# Patient Record
Sex: MALE | Race: WHITE | Employment: OTHER | URBAN - METROPOLITAN AREA
[De-identification: names, ages, dates, MRNs, and addresses within clinical notes are randomized per-mention and may not be internally consistent; named-entity substitution may affect disease eponyms.]

---

## 2020-03-04 RX ORDER — CEFAZOLIN SODIUM/WATER 2 G/20 ML
2 SYRINGE (ML) INTRAVENOUS ONCE
Status: CANCELLED | OUTPATIENT
Start: 2020-03-04 | End: 2020-03-04

## 2020-03-18 ENCOUNTER — HOSPITAL ENCOUNTER (OUTPATIENT)
Dept: SURGERY | Age: 85
Discharge: HOME | End: 2020-03-18
Payer: MEDICARE

## 2020-03-18 VITALS
OXYGEN SATURATION: 97 % | SYSTOLIC BLOOD PRESSURE: 103 MMHG | DIASTOLIC BLOOD PRESSURE: 67 MMHG | HEIGHT: 72 IN | RESPIRATION RATE: 18 BRPM | BODY MASS INDEX: 25.25 KG/M2 | TEMPERATURE: 98.1 F | HEART RATE: 94 BPM | WEIGHT: 186.44 LBS

## 2020-03-18 LAB
ANION GAP SERPL CALC-SCNC: 6 MMOL/L (ref 7–16)
BACTERIA SPEC CULT: NORMAL
BASOPHILS # BLD: 0 K/UL (ref 0–0.2)
BASOPHILS NFR BLD: 0 % (ref 0–2)
BUN SERPL-MCNC: 17 MG/DL (ref 8–23)
CALCIUM SERPL-MCNC: 9.2 MG/DL (ref 8.3–10.4)
CHLORIDE SERPL-SCNC: 105 MMOL/L (ref 98–107)
CO2 SERPL-SCNC: 29 MMOL/L (ref 21–32)
CREAT SERPL-MCNC: 1.19 MG/DL (ref 0.8–1.5)
DIFFERENTIAL METHOD BLD: ABNORMAL
EOSINOPHIL # BLD: 0.1 K/UL (ref 0–0.8)
EOSINOPHIL NFR BLD: 1 % (ref 0.5–7.8)
ERYTHROCYTE [DISTWIDTH] IN BLOOD BY AUTOMATED COUNT: 13.4 % (ref 11.9–14.6)
GLUCOSE SERPL-MCNC: 93 MG/DL (ref 65–100)
HCT VFR BLD AUTO: 39.3 % (ref 41.1–50.3)
HGB BLD-MCNC: 13.6 G/DL (ref 13.6–17.2)
IMM GRANULOCYTES # BLD AUTO: 0.1 K/UL (ref 0–0.5)
IMM GRANULOCYTES NFR BLD AUTO: 1 % (ref 0–5)
LYMPHOCYTES # BLD: 2 K/UL (ref 0.5–4.6)
LYMPHOCYTES NFR BLD: 38 % (ref 13–44)
MCH RBC QN AUTO: 32 PG (ref 26.1–32.9)
MCHC RBC AUTO-ENTMCNC: 34.6 G/DL (ref 31.4–35)
MCV RBC AUTO: 92.5 FL (ref 79.6–97.8)
MONOCYTES # BLD: 0.8 K/UL (ref 0.1–1.3)
MONOCYTES NFR BLD: 14 % (ref 4–12)
NEUTS SEG # BLD: 2.4 K/UL (ref 1.7–8.2)
NEUTS SEG NFR BLD: 46 % (ref 43–78)
NRBC # BLD: 0 K/UL (ref 0–0.2)
PLATELET # BLD AUTO: 172 K/UL (ref 150–450)
PMV BLD AUTO: 9 FL (ref 9.4–12.3)
POTASSIUM SERPL-SCNC: 4 MMOL/L (ref 3.5–5.1)
RBC # BLD AUTO: 4.25 M/UL (ref 4.23–5.6)
SERVICE CMNT-IMP: NORMAL
SODIUM SERPL-SCNC: 140 MMOL/L (ref 136–145)
WBC # BLD AUTO: 5.2 K/UL (ref 4.3–11.1)

## 2020-03-18 PROCEDURE — 80048 BASIC METABOLIC PNL TOTAL CA: CPT

## 2020-03-18 PROCEDURE — 87641 MR-STAPH DNA AMP PROBE: CPT

## 2020-03-18 PROCEDURE — 85025 COMPLETE CBC W/AUTO DIFF WBC: CPT

## 2020-03-18 PROCEDURE — 77030027138 HC INCENT SPIROMETER -A

## 2020-03-18 RX ORDER — GABAPENTIN 300 MG/1
300 CAPSULE ORAL 3 TIMES DAILY
Status: ON HOLD | COMMUNITY
End: 2020-06-01 | Stop reason: ALTCHOICE

## 2020-03-18 RX ORDER — HYDROCODONE BITARTRATE AND ACETAMINOPHEN 5; 325 MG/1; MG/1
1 TABLET ORAL
COMMUNITY
End: 2020-06-03

## 2020-03-18 RX ORDER — HYDROGEN PEROXIDE 3 %
20 SOLUTION, NON-ORAL MISCELLANEOUS DAILY
Status: ON HOLD | COMMUNITY
End: 2020-06-01 | Stop reason: ALTCHOICE

## 2020-03-18 RX ORDER — DOXAZOSIN 2 MG/1
4 TABLET ORAL DAILY
COMMUNITY

## 2020-03-18 RX ORDER — GUAIFENESIN 100 MG/5ML
81 LIQUID (ML) ORAL DAILY
COMMUNITY

## 2020-03-18 RX ORDER — TRAMADOL HYDROCHLORIDE 50 MG/1
50 TABLET ORAL
COMMUNITY

## 2020-03-18 RX ORDER — LISINOPRIL 5 MG/1
5 TABLET ORAL DAILY
COMMUNITY

## 2020-03-18 NOTE — PERIOP NOTES
Patient unable to provide U/A.  Urine specimen cup sent with patient and instructed to return in the am.

## 2020-03-18 NOTE — PERIOP NOTES
PLEASE CONTINUE TAKING ALL PRESCRIPTION MEDICATIONS UP TO THE DAY OF SURGERY UNLESS OTHERWISE DIRECTED BELOW. DISCONTINUE all vitamins and supplements 7 days prior to surgery. DISCONTINUE Non-Steriodal Anti-Inflammatory (NSAIDS) such as Advil and Aleve 5 days prior to surgery. Home Medications to take  the day of surgery    Acetaminophen 1000 mg, Aspirin 81 mg, Doxazosin, Esomeprazole, Levothyroxine   You may have Hydrocodone or Tramadol if needed on day of surgery        Home Medications   to Hold   None additional        Comments    On the day before surgery take Acetaminophen 1000mg in the morning and at bedtime       *Visitor policy of 1 visitor per patient discussed. Please do not bring home medications with you on the day of surgery unless otherwise directed by your nurse. If you are instructed to bring home medications, please give them to your nurse as they will be administered by the nursing staff. If you have any questions, please call [facility] [phone] or [facility] [phone]. A copy of this note was provided to the patient for reference.

## 2020-03-18 NOTE — PERIOP NOTES
Lab results reviewed and acceptable per anesthesia protocol. Recent Results (from the past 12 hour(s))   MSSA/MRSA SC BY PCR, NASAL SWAB    Collection Time: 03/18/20  1:26 PM   Result Value Ref Range    Special Requests: NO SPECIAL REQUESTS      Culture result:        SA target not detected. A MRSA NEGATIVE, SA NEGATIVE test result does not preclude MRSA or SA nasal colonization. CBC WITH AUTOMATED DIFF    Collection Time: 03/18/20  1:26 PM   Result Value Ref Range    WBC 5.2 4.3 - 11.1 K/uL    RBC 4.25 4.23 - 5.6 M/uL    HGB 13.6 13.6 - 17.2 g/dL    HCT 39.3 (L) 41.1 - 50.3 %    MCV 92.5 79.6 - 97.8 FL    MCH 32.0 26.1 - 32.9 PG    MCHC 34.6 31.4 - 35.0 g/dL    RDW 13.4 11.9 - 14.6 %    PLATELET 798 258 - 529 K/uL    MPV 9.0 (L) 9.4 - 12.3 FL    ABSOLUTE NRBC 0.00 0.0 - 0.2 K/uL    DF AUTOMATED      NEUTROPHILS 46 43 - 78 %    LYMPHOCYTES 38 13 - 44 %    MONOCYTES 14 (H) 4.0 - 12.0 %    EOSINOPHILS 1 0.5 - 7.8 %    BASOPHILS 0 0.0 - 2.0 %    IMMATURE GRANULOCYTES 1 0.0 - 5.0 %    ABS. NEUTROPHILS 2.4 1.7 - 8.2 K/UL    ABS. LYMPHOCYTES 2.0 0.5 - 4.6 K/UL    ABS. MONOCYTES 0.8 0.1 - 1.3 K/UL    ABS. EOSINOPHILS 0.1 0.0 - 0.8 K/UL    ABS. BASOPHILS 0.0 0.0 - 0.2 K/UL    ABS. IMM.  GRANS. 0.1 0.0 - 0.5 K/UL   METABOLIC PANEL, BASIC    Collection Time: 03/18/20  1:26 PM   Result Value Ref Range    Sodium 140 136 - 145 mmol/L    Potassium 4.0 3.5 - 5.1 mmol/L    Chloride 105 98 - 107 mmol/L    CO2 29 21 - 32 mmol/L    Anion gap 6 (L) 7 - 16 mmol/L    Glucose 93 65 - 100 mg/dL    BUN 17 8 - 23 MG/DL    Creatinine 1.19 0.8 - 1.5 MG/DL    GFR est AA >60 >60 ml/min/1.73m2    GFR est non-AA >60 >60 ml/min/1.73m2    Calcium 9.2 8.3 - 10.4 MG/DL   EKG, 12 LEAD, INITIAL    Collection Time: 03/18/20  1:37 PM   Result Value Ref Range    Ventricular Rate 60 BPM    Atrial Rate 60 BPM    P-R Interval 244 ms    QRS Duration 100 ms    Q-T Interval 412 ms    QTC Calculation (Bezet) 412 ms Calculated P Axis 53 degrees    Calculated R Axis -44 degrees    Calculated T Axis 23 degrees    Diagnosis       Sinus rhythm with 1st degree A-V block  Left axis deviation  Inferior infarct , age undetermined  Abnormal ECG  When compared with ECG of 03-APR-2001 13:15,  VA interval has increased  Inferior infarct is now Present

## 2020-03-18 NOTE — PERIOP NOTES
Patient verified name & . Order to obtain consent  found in EHR  and matches case posting. Pt verbalizes understanding of 1 total caregiver/ / visitor policy. TYPE  CASE:ll              Orders:  received  Labs per Spine protocol:  CBC, BMP, U/A, MRSA, EKG, T&S on DOS   Labs per anesthesia protocol: None additional  EKG/cardiac records  : Today SR with 1st degree AV block, within anesthesia guidelines. Patient has total of 6 stents, last placed in 2017. No further cardiac events since. Patient does not take any thinners. Glucose: None needed    Medication bottles were visualized today. Instructed patient to continue previous medications as prescribed prior to surgery and  to [address] ON THE DAY OF SURGERY according to anesthesia guidelines with a small sip of water : See med list       Continue all previous medications unless otherwise directed. Instructed patient to hold all vitamins and supplements (with exception of renal vitamins) and the following medications prior to surgery: None additional    Pt viewed Spine Pre-hab Video. All further questions were addressed. Pt was provided with antibacterial or non-moisturizing soap, Hibiclens, lSpine Recovery booklet and incentive spirometer. Pt correctly demonstrated use of incentive spirometer and instruction to bring it to the hospital on day of surgery. Handouts and all Surgery instructions provided to pt and pt verbalizes understanding. Patient Guide to Surgery Packet provided to patient. Packet includes Patient Guide to surgery handout, Facts about Pain Management handout, Facts about Urinary Catherization handout, Hand Hygiene handout, Patient Education and Teaching Sheet -Transfusion of Blood and Blood Products handout, and  [facility] frequent question and answer sheet. Guide reviewed with the patient and all questions answered to the satisfaction of the patient.      Pt advised to visit [email or web address] for more educational information regarding anesthesia and to record any additional questions that arise so that it can be addressed by the anesthesiologist on the morning of surgery. Patient instructed on the following and verbalized understanding:  Arrive at MAIN entrance, time of arrival to be called the day before by 1700. Responsible adult must drive patient to and from hospital, stay during surgery and 24 hours postoperatively. Npo after midnight including gum, mints and ice chips. Shower using hibiclens the night before and the morning of surgery. Hibiclens provided to the patient with handout and verbal instructions for use. Leave all valuables at home. Instructed on no jewelry or body piercings on the dos. Bring insurance card and ID. No perfumes, oil, powder, colognes, makeup or  lotions on the skin. Patient verbalized understanding of all instructions and provided all medical/health information to the best of their ability.

## 2020-03-19 ENCOUNTER — HOSPITAL ENCOUNTER (OUTPATIENT)
Dept: LAB | Age: 85
Discharge: HOME | End: 2020-03-19
Payer: MEDICARE

## 2020-03-19 LAB
APPEARANCE UR: NORMAL
ATRIAL RATE: 60 BPM
BILIRUB UR QL: NEGATIVE
CALCULATED P AXIS, ECG09: 53 DEGREES
CALCULATED R AXIS, ECG10: -44 DEGREES
CALCULATED T AXIS, ECG11: 23 DEGREES
COLOR UR: YELLOW
DIAGNOSIS, 93000: NORMAL
GLUCOSE UR STRIP.AUTO-MCNC: NEGATIVE MG/DL
HGB UR QL STRIP: NEGATIVE
KETONES UR QL STRIP.AUTO: NEGATIVE MG/DL
LEUKOCYTE ESTERASE UR QL STRIP.AUTO: NEGATIVE
NITRITE UR QL STRIP.AUTO: NEGATIVE
P-R INTERVAL, ECG05: 244 MS
PH UR STRIP: 6 [PH] (ref 5–9)
PROT UR STRIP-MCNC: NEGATIVE MG/DL
Q-T INTERVAL, ECG07: 412 MS
QRS DURATION, ECG06: 100 MS
QTC CALCULATION (BEZET), ECG08: 412 MS
SP GR UR REFRACTOMETRY: 1.01 (ref 1–1.02)
UROBILINOGEN UR QL STRIP.AUTO: 0.2 EU/DL (ref 0.2–1)
VENTRICULAR RATE, ECG03: 60 BPM

## 2020-03-19 PROCEDURE — 81003 URINALYSIS AUTO W/O SCOPE: CPT

## 2020-05-04 RX ORDER — CEFAZOLIN SODIUM/WATER 2 G/20 ML
2 SYRINGE (ML) INTRAVENOUS ONCE
Status: CANCELLED | OUTPATIENT
Start: 2020-05-04 | End: 2020-05-04

## 2020-05-20 NOTE — PERIOP NOTES
Pt notified that a negative Covid swab result is required to proceed with surgery; the swab will be collected 7 days prior to surgery at the [facility] at [address] [place] [address]. Per patient appointment scheduled 5/22/20 at 1400.

## 2020-05-27 ENCOUNTER — ANESTHESIA EVENT (OUTPATIENT)
Dept: SURGERY | Age: 85
DRG: 460 | End: 2020-05-27
Payer: MEDICARE

## 2020-05-27 ENCOUNTER — HOSPITAL ENCOUNTER (OUTPATIENT)
Dept: SURGERY | Age: 85
Discharge: HOME | DRG: 460 | End: 2020-05-27
Payer: MEDICARE

## 2020-05-27 LAB
ANION GAP SERPL CALC-SCNC: 4 MMOL/L (ref 7–16)
APPEARANCE UR: CLEAR
BACTERIA SPEC CULT: NORMAL
BACTERIA URNS QL MICRO: 0 /HPF
BASOPHILS # BLD: 0 K/UL (ref 0–0.2)
BASOPHILS NFR BLD: 0 % (ref 0–2)
BILIRUB UR QL: NEGATIVE
BUN SERPL-MCNC: 19 MG/DL (ref 8–23)
CALCIUM SERPL-MCNC: 9.2 MG/DL (ref 8.3–10.4)
CASTS URNS QL MICRO: ABNORMAL /LPF
CHLORIDE SERPL-SCNC: 106 MMOL/L (ref 98–107)
CO2 SERPL-SCNC: 30 MMOL/L (ref 21–32)
COLOR UR: YELLOW
CREAT SERPL-MCNC: 0.97 MG/DL (ref 0.8–1.5)
DIFFERENTIAL METHOD BLD: ABNORMAL
EOSINOPHIL # BLD: 0.1 K/UL (ref 0–0.8)
EOSINOPHIL NFR BLD: 1 % (ref 0.5–7.8)
EPI CELLS #/AREA URNS HPF: 0 /HPF
ERYTHROCYTE [DISTWIDTH] IN BLOOD BY AUTOMATED COUNT: 13.3 % (ref 11.9–14.6)
GLUCOSE SERPL-MCNC: 94 MG/DL (ref 65–100)
GLUCOSE UR STRIP.AUTO-MCNC: NEGATIVE MG/DL
HCT VFR BLD AUTO: 39.8 % (ref 41.1–50.3)
HGB BLD-MCNC: 13.3 G/DL (ref 13.6–17.2)
HGB UR QL STRIP: NEGATIVE
IMM GRANULOCYTES # BLD AUTO: 0.1 K/UL (ref 0–0.5)
IMM GRANULOCYTES NFR BLD AUTO: 1 % (ref 0–5)
KETONES UR QL STRIP.AUTO: NEGATIVE MG/DL
LEUKOCYTE ESTERASE UR QL STRIP.AUTO: NEGATIVE
LYMPHOCYTES # BLD: 2 K/UL (ref 0.5–4.6)
LYMPHOCYTES NFR BLD: 35 % (ref 13–44)
MCH RBC QN AUTO: 31.4 PG (ref 26.1–32.9)
MCHC RBC AUTO-ENTMCNC: 33.4 G/DL (ref 31.4–35)
MCV RBC AUTO: 94.1 FL (ref 79.6–97.8)
MONOCYTES # BLD: 0.7 K/UL (ref 0.1–1.3)
MONOCYTES NFR BLD: 13 % (ref 4–12)
NEUTS SEG # BLD: 2.7 K/UL (ref 1.7–8.2)
NEUTS SEG NFR BLD: 49 % (ref 43–78)
NITRITE UR QL STRIP.AUTO: NEGATIVE
NRBC # BLD: 0 K/UL (ref 0–0.2)
PH UR STRIP: 5.5 [PH] (ref 5–9)
PLATELET # BLD AUTO: 145 K/UL (ref 150–450)
PMV BLD AUTO: 8.9 FL (ref 9.4–12.3)
POTASSIUM SERPL-SCNC: 3.9 MMOL/L (ref 3.5–5.1)
PROT UR STRIP-MCNC: ABNORMAL MG/DL
RBC # BLD AUTO: 4.23 M/UL (ref 4.23–5.6)
RBC #/AREA URNS HPF: 0 /HPF
SERVICE CMNT-IMP: NORMAL
SODIUM SERPL-SCNC: 140 MMOL/L (ref 136–145)
SP GR UR REFRACTOMETRY: 1.02 (ref 1–1.02)
UROBILINOGEN UR QL STRIP.AUTO: 1 EU/DL (ref 0.2–1)
WBC # BLD AUTO: 5.5 K/UL (ref 4.3–11.1)
WBC URNS QL MICRO: 0 /HPF

## 2020-05-27 PROCEDURE — 80048 BASIC METABOLIC PNL TOTAL CA: CPT

## 2020-05-27 PROCEDURE — 87641 MR-STAPH DNA AMP PROBE: CPT

## 2020-05-27 PROCEDURE — 81003 URINALYSIS AUTO W/O SCOPE: CPT

## 2020-05-27 PROCEDURE — 85025 COMPLETE CBC W/AUTO DIFF WBC: CPT

## 2020-05-27 NOTE — PERIOP NOTES
PLEASE CONTINUE TAKING ALL PRESCRIPTION MEDICATIONS UP TO THE DAY OF SURGERY UNLESS OTHERWISE DIRECTED BELOW. DISCONTINUE all vitamins and supplements 7 days prior to surgery. DISCONTINUE Non-Steriodal Anti-Inflammatory (NSAIDS) such as Advil and Aleve 5 days prior to surgery. Home Medications to take  the day of surgery    Asprin, Doxazosin, Nexium, Levothyroxine, Norco or Tramadol for pain . Home Medications   to Hold           Comments         . Please do not bring home medications with you on the day of surgery unless otherwise directed by your nurse. If you are instructed to bring home medications, please give them to your nurse as they will be administered by the nursing staff. If you have any questions, please call [facility] [phone] or [address] [place] [address] [phone]. A copy of this note was provided to the patient for reference.

## 2020-05-27 NOTE — PERIOP NOTES
Patient verified name and     Order for consent  found in EHR and matches case posting; patient verified. Type 3 surgery, walk in assessment complete. Labs per surgeon: MRSA; results pending  Labs per anesthesia protocol: cbc,bmp,ua,mrsa; results pending  EKG: 3/18/20  Dr Reji Olmos notified of surgery, he does not need to see pt today. Patient informed a Covid swab is required 7 days prior to surgery. The testing center is located at the [address], [place]. For questions or concerns the patient is advised to call [phone]. An appointment is required and the testing clinic is closed from 12- for lunch and on weekends. Appointment date/time 20 found in EHR and negative test noted. Hospital approved surgical skin cleanser and instructions given per hospital policy. Patient provided with and instructed on educational handouts including Guide to Surgery, Pain Management, Hand Hygiene, Blood Transfusion Education, and [facility] Brochure. Patient answered medical/surgical history questions at their best of ability. All prior to admission medications documented in [facility]. Original medication prescription bottle not visualized during patient appointment. Patient instructed to hold all vitamins 7 days prior to surgery and NSAIDS 5 days prior to surgery, patient verbalized understanding. Patient teach back successful and patient demonstrates knowledge of instructions.

## 2020-05-27 NOTE — PERIOP NOTES
Recent Results (from the past 12 hour(s))   MSSA/MRSA SC BY PCR, NASAL SWAB    Collection Time: 05/27/20 11:08 AM   Result Value Ref Range    Special Requests: NO SPECIAL REQUESTS      Culture result:        SA target not detected. A MRSA NEGATIVE, SA NEGATIVE test result does not preclude MRSA or SA nasal colonization. CBC WITH AUTOMATED DIFF    Collection Time: 05/27/20 11:08 AM   Result Value Ref Range    WBC 5.5 4.3 - 11.1 K/uL    RBC 4.23 4.23 - 5.6 M/uL    HGB 13.3 (L) 13.6 - 17.2 g/dL    HCT 39.8 (L) 41.1 - 50.3 %    MCV 94.1 79.6 - 97.8 FL    MCH 31.4 26.1 - 32.9 PG    MCHC 33.4 31.4 - 35.0 g/dL    RDW 13.3 11.9 - 14.6 %    PLATELET 017 (L) 475 - 450 K/uL    MPV 8.9 (L) 9.4 - 12.3 FL    ABSOLUTE NRBC 0.00 0.0 - 0.2 K/uL    DF AUTOMATED      NEUTROPHILS 49 43 - 78 %    LYMPHOCYTES 35 13 - 44 %    MONOCYTES 13 (H) 4.0 - 12.0 %    EOSINOPHILS 1 0.5 - 7.8 %    BASOPHILS 0 0.0 - 2.0 %    IMMATURE GRANULOCYTES 1 0.0 - 5.0 %    ABS. NEUTROPHILS 2.7 1.7 - 8.2 K/UL    ABS. LYMPHOCYTES 2.0 0.5 - 4.6 K/UL    ABS. MONOCYTES 0.7 0.1 - 1.3 K/UL    ABS. EOSINOPHILS 0.1 0.0 - 0.8 K/UL    ABS. BASOPHILS 0.0 0.0 - 0.2 K/UL    ABS. IMM.  GRANS. 0.1 0.0 - 0.5 K/UL   METABOLIC PANEL, BASIC    Collection Time: 05/27/20 11:08 AM   Result Value Ref Range    Sodium 140 136 - 145 mmol/L    Potassium 3.9 3.5 - 5.1 mmol/L    Chloride 106 98 - 107 mmol/L    CO2 30 21 - 32 mmol/L    Anion gap 4 (L) 7 - 16 mmol/L    Glucose 94 65 - 100 mg/dL    BUN 19 8 - 23 MG/DL    Creatinine 0.97 0.8 - 1.5 MG/DL    GFR est AA >60 >60 ml/min/1.73m2    GFR est non-AA >60 >60 ml/min/1.73m2    Calcium 9.2 8.3 - 10.4 MG/DL   URINALYSIS W/ RFLX MICROSCOPIC    Collection Time: 05/27/20 11:08 AM   Result Value Ref Range    Color YELLOW      Appearance CLEAR      Specific gravity 1.019 1.001 - 1.023      pH (UA) 5.5 5.0 - 9.0      Protein TRACE (A) NEG mg/dL    Glucose Negative mg/dL    Ketone Negative NEG mg/dL    Bilirubin Negative NEG      Blood Negative NEG      Urobilinogen 1.0 0.2 - 1.0 EU/dL    Nitrites Negative NEG      Leukocyte Esterase Negative NEG      WBC 0 0 /hpf    RBC 0 0 /hpf    Epithelial cells 0 0 /hpf    Bacteria 0 0 /hpf    Casts 0-3 0 /lpf   Reviewed

## 2020-05-28 NOTE — H&P
Medications:Cefpodoxime Proxetil;GABAPENTIN 300 MG CAPSULE (300 MG, TAKE 1 TABLET(S) BY MOUTH 2 TIMES A DAY FOR 30 DAYS); Levothyroxine Sodium (100 MCG); Norco (5-325 MG, Take 1 tablet(s) by mouth 4 times a day as needed [PRN chronic pain]);predniSONE (10 MG (48), USE AS DIRECTED, 12 DAY); Synthroid;TraMADol HCl (50 MG, Take 1 tablet(s) by mouth 3 times a day as needed [PRN]);Zantac    CC:  Preoperative check. HPI:  This is an 29-year-old who had multiple surgeries by Dr. Ralph Proctor. At this point he is fused L1 to S1, but he has instrumentation just at L1-L2-L3 and then a single screw at L5 on one side. He had done well for several years but started having recurrent symptoms. Sounds like stenosis, and an MRI scan did show stenosis at T12, L1, and he would get better with epidural blocks, but it would wear off. It has gotten progressively worse, and he is very limited on his ability to stand and walk. We have been planning to do a fusion for a long time, extend this fusion up, but because of the COVID infection this had to be postponed. He has required narcotics monthly to manage this. We got him scheduled. We are going to decompress the T12/L1 level. We might even check up T11-T12, but we are going to add pedicle screws T10 down and connect the rods. Hopefully this will give him satisfactory relief. He tells me today that he has had some pain in the SI joints for a while, and he gets regular SI joint injections from Dr. Marlena Christiansen and wanted to know about the possibility of an SI joint fusion. I discussed it with him. In general though I think it might make him extremely stiff. I would be wary about it, but certainly I think he needs to get over this surgery and see how he feels and see if that is still an issue. He has had no changes in his medical history or symptomatology. PE:  On exam he looks like he is in good shape. He is alert and oriented x3.  He still on aspirin because of the multiple stents he has had but no other blood thinners. On exam his pupils are equal and round but somewhat minimally reactive to light and somewhat constricted from his pain medicine use. Neck is without adenopathy or bruit. Lungs are clear to auscultation bilaterally. Heart is regular rate and rhythm with just a small systolic ejection murmur. Abdomen is soft and nontender. RADIOGRAPHS:  I did review his x-rays and MRI scan again. ASSESSMENT/PLAN:   He has already had his COVID test. He goes for a preoperative visit tomorrow.       Electronically Signed By Froy Varner MD

## 2020-05-29 ENCOUNTER — HOSPITAL ENCOUNTER (INPATIENT)
Age: 85
LOS: 5 days | Discharge: HOME | DRG: 460 | End: 2020-06-03
Attending: ORTHOPAEDIC SURGERY | Admitting: ORTHOPAEDIC SURGERY
Payer: MEDICARE

## 2020-05-29 ENCOUNTER — ANESTHESIA (OUTPATIENT)
Dept: SURGERY | Age: 85
DRG: 460 | End: 2020-05-29
Payer: MEDICARE

## 2020-05-29 ENCOUNTER — APPOINTMENT (OUTPATIENT)
Dept: GENERAL RADIOLOGY | Age: 85
DRG: 460 | End: 2020-05-29
Attending: ORTHOPAEDIC SURGERY
Payer: MEDICARE

## 2020-05-29 DIAGNOSIS — M48.05 SPINAL STENOSIS OF THORACOLUMBAR REGION: Primary | ICD-10-CM

## 2020-05-29 PROBLEM — M48.00 SPINAL STENOSIS: Status: ACTIVE | Noted: 2020-05-29

## 2020-05-29 LAB
ABO + RH BLD: NORMAL
BLOOD GROUP ANTIBODIES SERPL: NORMAL
SPECIMEN EXP DATE BLD: NORMAL

## 2020-05-29 PROCEDURE — 77030040922 HC BLNKT HYPOTHRM STRY -A: Performed by: ANESTHESIOLOGY

## 2020-05-29 PROCEDURE — 77030037088 HC TUBE ENDOTRACH ORAL NSL COVD-A: Performed by: ANESTHESIOLOGY

## 2020-05-29 PROCEDURE — 77030005401 HC CATH RAD ARRO -A: Performed by: ANESTHESIOLOGY

## 2020-05-29 PROCEDURE — 74011250637 HC RX REV CODE- 250/637: Performed by: ANESTHESIOLOGY

## 2020-05-29 PROCEDURE — 77030037709: Performed by: ORTHOPAEDIC SURGERY

## 2020-05-29 PROCEDURE — 72080 X-RAY EXAM THORACOLMB 2/> VW: CPT

## 2020-05-29 PROCEDURE — 74011250637 HC RX REV CODE- 250/637: Performed by: ORTHOPAEDIC SURGERY

## 2020-05-29 PROCEDURE — 74011250636 HC RX REV CODE- 250/636

## 2020-05-29 PROCEDURE — 77030013794 HC KT TRNSDUC BLD EDWD -B: Performed by: ANESTHESIOLOGY

## 2020-05-29 PROCEDURE — 77030038552 HC DRN WND MDII -A: Performed by: ORTHOPAEDIC SURGERY

## 2020-05-29 PROCEDURE — 77030019908 HC STETH ESOPH SIMS -A: Performed by: ANESTHESIOLOGY

## 2020-05-29 PROCEDURE — 0RG7071 FUSION OF 2 TO 7 THORACIC VERTEBRAL JOINTS WITH AUTOLOGOUS TISSUE SUBSTITUTE, POSTERIOR APPROACH, POSTERIOR COLUMN, OPEN APPROACH: ICD-10-PCS | Performed by: ORTHOPAEDIC SURGERY

## 2020-05-29 PROCEDURE — 77030021678 HC GLIDESCP STAT DISP VERT -B: Performed by: ANESTHESIOLOGY

## 2020-05-29 PROCEDURE — 74011000250 HC RX REV CODE- 250: Performed by: NURSE ANESTHETIST, CERTIFIED REGISTERED

## 2020-05-29 PROCEDURE — 74011250636 HC RX REV CODE- 250/636: Performed by: ORTHOPAEDIC SURGERY

## 2020-05-29 PROCEDURE — 77030012894: Performed by: ORTHOPAEDIC SURGERY

## 2020-05-29 PROCEDURE — 74011250636 HC RX REV CODE- 250/636: Performed by: ANESTHESIOLOGY

## 2020-05-29 PROCEDURE — 77030031139 HC SUT VCRL2 J&J -A: Performed by: ORTHOPAEDIC SURGERY

## 2020-05-29 PROCEDURE — 77030028270 HC SRGFL HEMSTAT MTRX J&J -C: Performed by: ORTHOPAEDIC SURGERY

## 2020-05-29 PROCEDURE — 77030040361 HC SLV COMPR DVT MDII -B: Performed by: ORTHOPAEDIC SURGERY

## 2020-05-29 PROCEDURE — 76010000177 HC OR TIME 5 TO 5.5 HR INTENSV-TIER 1: Performed by: ORTHOPAEDIC SURGERY

## 2020-05-29 PROCEDURE — 77030008542 HC TBNG MON PRSS EDWD -A: Performed by: ANESTHESIOLOGY

## 2020-05-29 PROCEDURE — 76210000000 HC OR PH I REC 2 TO 2.5 HR: Performed by: ORTHOPAEDIC SURGERY

## 2020-05-29 PROCEDURE — 77030018836 HC SOL IRR NACL ICUM -A: Performed by: ORTHOPAEDIC SURGERY

## 2020-05-29 PROCEDURE — 74011000272 HC RX REV CODE- 272: Performed by: ORTHOPAEDIC SURGERY

## 2020-05-29 PROCEDURE — C1713 ANCHOR/SCREW BN/BN,TIS/BN: HCPCS | Performed by: ORTHOPAEDIC SURGERY

## 2020-05-29 PROCEDURE — 74011250637 HC RX REV CODE- 250/637

## 2020-05-29 PROCEDURE — 77030025623 HC BUR RND PRECIS STRY -D: Performed by: ORTHOPAEDIC SURGERY

## 2020-05-29 PROCEDURE — 77030040830 HC CATH URETH FOL MDII -A: Performed by: ORTHOPAEDIC SURGERY

## 2020-05-29 PROCEDURE — 76060000041 HC ANESTHESIA 5 TO 5.5 HR: Performed by: ORTHOPAEDIC SURGERY

## 2020-05-29 PROCEDURE — 65270000029 HC RM PRIVATE

## 2020-05-29 PROCEDURE — 86900 BLOOD TYPING SEROLOGIC ABO: CPT

## 2020-05-29 PROCEDURE — 74011250636 HC RX REV CODE- 250/636: Performed by: NURSE ANESTHETIST, CERTIFIED REGISTERED

## 2020-05-29 PROCEDURE — 74011000250 HC RX REV CODE- 250: Performed by: ORTHOPAEDIC SURGERY

## 2020-05-29 PROCEDURE — 77030029099 HC BN WAX SSPC -A: Performed by: ORTHOPAEDIC SURGERY

## 2020-05-29 PROCEDURE — 77030034475 HC MISC IMPL SPN: Performed by: ORTHOPAEDIC SURGERY

## 2020-05-29 PROCEDURE — P9045 ALBUMIN (HUMAN), 5%, 250 ML: HCPCS

## 2020-05-29 PROCEDURE — 00NX0ZZ RELEASE THORACIC SPINAL CORD, OPEN APPROACH: ICD-10-PCS | Performed by: ORTHOPAEDIC SURGERY

## 2020-05-29 DEVICE — POLYAXIAL SCREW
Type: IMPLANTABLE DEVICE | Site: SPINE LUMBAR | Status: FUNCTIONAL
Brand: XIA 3 SYSTEM - SERRATO

## 2020-05-29 DEVICE — 43-54 MM MULTI AXIAL CROSS CONNECTOR
Type: IMPLANTABLE DEVICE | Site: SPINE LUMBAR | Status: FUNCTIONAL
Brand: XIA 3

## 2020-05-29 DEVICE — BLOCKER
Type: IMPLANTABLE DEVICE | Site: SPINE LUMBAR | Status: FUNCTIONAL
Brand: XIA 3

## 2020-05-29 DEVICE — BIO DBM PLUS PUTTY (WITH CANCELLOUS)
Type: IMPLANTABLE DEVICE | Site: SPINE LUMBAR | Status: FUNCTIONAL
Brand: BIO DBM

## 2020-05-29 RX ORDER — LIDOCAINE HYDROCHLORIDE 10 MG/ML
0.1 INJECTION INFILTRATION; PERINEURAL AS NEEDED
Status: DISCONTINUED | OUTPATIENT
Start: 2020-05-29 | End: 2020-05-29 | Stop reason: HOSPADM

## 2020-05-29 RX ORDER — ALBUMIN HUMAN 50 G/1000ML
SOLUTION INTRAVENOUS
Status: DISCONTINUED | OUTPATIENT
Start: 2020-05-29 | End: 2020-05-29 | Stop reason: HOSPADM

## 2020-05-29 RX ORDER — LISINOPRIL 5 MG/1
5 TABLET ORAL DAILY
Status: DISCONTINUED | OUTPATIENT
Start: 2020-05-30 | End: 2020-06-03 | Stop reason: HOSPADM

## 2020-05-29 RX ORDER — CEFAZOLIN SODIUM/WATER 2 G/20 ML
2 SYRINGE (ML) INTRAVENOUS EVERY 8 HOURS
Status: COMPLETED | OUTPATIENT
Start: 2020-05-29 | End: 2020-05-30

## 2020-05-29 RX ORDER — HYDROMORPHONE HYDROCHLORIDE 2 MG/ML
INJECTION, SOLUTION INTRAMUSCULAR; INTRAVENOUS; SUBCUTANEOUS AS NEEDED
Status: DISCONTINUED | OUTPATIENT
Start: 2020-05-29 | End: 2020-05-29 | Stop reason: HOSPADM

## 2020-05-29 RX ORDER — ADHESIVE BANDAGE
30 BANDAGE TOPICAL DAILY
Status: DISCONTINUED | OUTPATIENT
Start: 2020-05-30 | End: 2020-06-03 | Stop reason: HOSPADM

## 2020-05-29 RX ORDER — ACETAMINOPHEN 10 MG/ML
1000 INJECTION, SOLUTION INTRAVENOUS ONCE
Status: COMPLETED | OUTPATIENT
Start: 2020-05-29 | End: 2020-05-29

## 2020-05-29 RX ORDER — PANTOPRAZOLE SODIUM 40 MG/1
40 TABLET, DELAYED RELEASE ORAL
Status: DISCONTINUED | OUTPATIENT
Start: 2020-05-30 | End: 2020-06-03 | Stop reason: HOSPADM

## 2020-05-29 RX ORDER — MIDAZOLAM HYDROCHLORIDE 1 MG/ML
2 INJECTION, SOLUTION INTRAMUSCULAR; INTRAVENOUS
Status: DISCONTINUED | OUTPATIENT
Start: 2020-05-29 | End: 2020-05-29 | Stop reason: HOSPADM

## 2020-05-29 RX ORDER — DEXAMETHASONE SODIUM PHOSPHATE 4 MG/ML
INJECTION, SOLUTION INTRA-ARTICULAR; INTRALESIONAL; INTRAMUSCULAR; INTRAVENOUS; SOFT TISSUE AS NEEDED
Status: DISCONTINUED | OUTPATIENT
Start: 2020-05-29 | End: 2020-05-29 | Stop reason: HOSPADM

## 2020-05-29 RX ORDER — SODIUM CHLORIDE, SODIUM LACTATE, POTASSIUM CHLORIDE, CALCIUM CHLORIDE 600; 310; 30; 20 MG/100ML; MG/100ML; MG/100ML; MG/100ML
75 INJECTION, SOLUTION INTRAVENOUS CONTINUOUS
Status: DISCONTINUED | OUTPATIENT
Start: 2020-05-29 | End: 2020-05-29 | Stop reason: HOSPADM

## 2020-05-29 RX ORDER — SODIUM CHLORIDE 0.9 % (FLUSH) 0.9 %
5-40 SYRINGE (ML) INJECTION EVERY 8 HOURS
Status: DISCONTINUED | OUTPATIENT
Start: 2020-05-29 | End: 2020-05-29 | Stop reason: HOSPADM

## 2020-05-29 RX ORDER — SODIUM CHLORIDE 0.9 % (FLUSH) 0.9 %
5-40 SYRINGE (ML) INJECTION AS NEEDED
Status: DISCONTINUED | OUTPATIENT
Start: 2020-05-29 | End: 2020-05-29 | Stop reason: HOSPADM

## 2020-05-29 RX ORDER — DIPHENHYDRAMINE HCL 25 MG
25 CAPSULE ORAL
Status: DISCONTINUED | OUTPATIENT
Start: 2020-05-29 | End: 2020-06-03 | Stop reason: HOSPADM

## 2020-05-29 RX ORDER — HYDROMORPHONE HYDROCHLORIDE 2 MG/ML
0.5 INJECTION, SOLUTION INTRAMUSCULAR; INTRAVENOUS; SUBCUTANEOUS
Status: DISCONTINUED | OUTPATIENT
Start: 2020-05-29 | End: 2020-05-29 | Stop reason: HOSPADM

## 2020-05-29 RX ORDER — NALOXONE HYDROCHLORIDE 0.4 MG/ML
0.2 INJECTION, SOLUTION INTRAMUSCULAR; INTRAVENOUS; SUBCUTANEOUS AS NEEDED
Status: DISCONTINUED | OUTPATIENT
Start: 2020-05-29 | End: 2020-05-29 | Stop reason: HOSPADM

## 2020-05-29 RX ORDER — SODIUM CHLORIDE 0.9 % (FLUSH) 0.9 %
5-40 SYRINGE (ML) INJECTION AS NEEDED
Status: DISCONTINUED | OUTPATIENT
Start: 2020-05-29 | End: 2020-06-03 | Stop reason: HOSPADM

## 2020-05-29 RX ORDER — GABAPENTIN 300 MG/1
600 CAPSULE ORAL ONCE
Status: DISCONTINUED | OUTPATIENT
Start: 2020-05-29 | End: 2020-05-29 | Stop reason: HOSPADM

## 2020-05-29 RX ORDER — HYDROCODONE BITARTRATE AND ACETAMINOPHEN 10; 325 MG/1; MG/1
1 TABLET ORAL
Status: DISCONTINUED | OUTPATIENT
Start: 2020-05-29 | End: 2020-06-03 | Stop reason: HOSPADM

## 2020-05-29 RX ORDER — SODIUM CHLORIDE, SODIUM LACTATE, POTASSIUM CHLORIDE, CALCIUM CHLORIDE 600; 310; 30; 20 MG/100ML; MG/100ML; MG/100ML; MG/100ML
25 INJECTION, SOLUTION INTRAVENOUS CONTINUOUS
Status: DISCONTINUED | OUTPATIENT
Start: 2020-05-29 | End: 2020-05-29 | Stop reason: HOSPADM

## 2020-05-29 RX ORDER — ROCURONIUM BROMIDE 10 MG/ML
INJECTION, SOLUTION INTRAVENOUS AS NEEDED
Status: DISCONTINUED | OUTPATIENT
Start: 2020-05-29 | End: 2020-05-29 | Stop reason: HOSPADM

## 2020-05-29 RX ORDER — VANCOMYCIN HYDROCHLORIDE 1 G/20ML
INJECTION, POWDER, LYOPHILIZED, FOR SOLUTION INTRAVENOUS AS NEEDED
Status: DISCONTINUED | OUTPATIENT
Start: 2020-05-29 | End: 2020-05-29 | Stop reason: HOSPADM

## 2020-05-29 RX ORDER — SCOLOPAMINE TRANSDERMAL SYSTEM 1 MG/1
1 PATCH, EXTENDED RELEASE TRANSDERMAL ONCE
Status: DISCONTINUED | OUTPATIENT
Start: 2020-05-29 | End: 2020-05-29 | Stop reason: HOSPADM

## 2020-05-29 RX ORDER — GUAIFENESIN 100 MG/5ML
81 LIQUID (ML) ORAL DAILY
Status: DISCONTINUED | OUTPATIENT
Start: 2020-05-30 | End: 2020-06-03 | Stop reason: HOSPADM

## 2020-05-29 RX ORDER — EPHEDRINE SULFATE/0.9% NACL/PF 50 MG/5 ML
SYRINGE (ML) INTRAVENOUS AS NEEDED
Status: DISCONTINUED | OUTPATIENT
Start: 2020-05-29 | End: 2020-05-29 | Stop reason: HOSPADM

## 2020-05-29 RX ORDER — LORAZEPAM 0.5 MG/1
1 TABLET ORAL
Status: DISCONTINUED | OUTPATIENT
Start: 2020-05-29 | End: 2020-06-03 | Stop reason: HOSPADM

## 2020-05-29 RX ORDER — ONDANSETRON 4 MG/1
4 TABLET, ORALLY DISINTEGRATING ORAL
Status: DISCONTINUED | OUTPATIENT
Start: 2020-05-29 | End: 2020-06-03 | Stop reason: HOSPADM

## 2020-05-29 RX ORDER — TRAMADOL HYDROCHLORIDE 50 MG/1
50 TABLET ORAL
Status: DISCONTINUED | OUTPATIENT
Start: 2020-05-29 | End: 2020-06-03 | Stop reason: HOSPADM

## 2020-05-29 RX ORDER — ACETAMINOPHEN 500 MG
1000 TABLET ORAL ONCE
Status: COMPLETED | OUTPATIENT
Start: 2020-05-29 | End: 2020-05-29

## 2020-05-29 RX ORDER — FENTANYL CITRATE 50 UG/ML
INJECTION, SOLUTION INTRAMUSCULAR; INTRAVENOUS AS NEEDED
Status: DISCONTINUED | OUTPATIENT
Start: 2020-05-29 | End: 2020-05-29 | Stop reason: HOSPADM

## 2020-05-29 RX ORDER — SODIUM CHLORIDE, SODIUM LACTATE, POTASSIUM CHLORIDE, CALCIUM CHLORIDE 600; 310; 30; 20 MG/100ML; MG/100ML; MG/100ML; MG/100ML
INJECTION, SOLUTION INTRAVENOUS
Status: DISCONTINUED | OUTPATIENT
Start: 2020-05-29 | End: 2020-05-29 | Stop reason: HOSPADM

## 2020-05-29 RX ORDER — CYCLOBENZAPRINE HCL 10 MG
5 TABLET ORAL
Status: DISCONTINUED | OUTPATIENT
Start: 2020-05-29 | End: 2020-06-03 | Stop reason: HOSPADM

## 2020-05-29 RX ORDER — GABAPENTIN 300 MG/1
300 CAPSULE ORAL 3 TIMES DAILY
Status: DISCONTINUED | OUTPATIENT
Start: 2020-05-29 | End: 2020-06-01

## 2020-05-29 RX ORDER — LIDOCAINE HYDROCHLORIDE 20 MG/ML
INJECTION, SOLUTION EPIDURAL; INFILTRATION; INTRACAUDAL; PERINEURAL AS NEEDED
Status: DISCONTINUED | OUTPATIENT
Start: 2020-05-29 | End: 2020-05-29 | Stop reason: HOSPADM

## 2020-05-29 RX ORDER — HYDROCODONE BITARTRATE AND ACETAMINOPHEN 10; 325 MG/1; MG/1
1 TABLET ORAL
Qty: 28 TAB | Refills: 0 | Status: SHIPPED | OUTPATIENT
Start: 2020-05-29 | End: 2020-06-05

## 2020-05-29 RX ORDER — CEFAZOLIN SODIUM/WATER 2 G/20 ML
2 SYRINGE (ML) INTRAVENOUS ONCE
Status: COMPLETED | OUTPATIENT
Start: 2020-05-29 | End: 2020-05-29

## 2020-05-29 RX ORDER — PROPOFOL 10 MG/ML
INJECTION, EMULSION INTRAVENOUS AS NEEDED
Status: DISCONTINUED | OUTPATIENT
Start: 2020-05-29 | End: 2020-05-29 | Stop reason: HOSPADM

## 2020-05-29 RX ORDER — HYDROMORPHONE HYDROCHLORIDE 1 MG/ML
1 INJECTION, SOLUTION INTRAMUSCULAR; INTRAVENOUS; SUBCUTANEOUS
Status: DISCONTINUED | OUTPATIENT
Start: 2020-05-29 | End: 2020-06-03 | Stop reason: HOSPADM

## 2020-05-29 RX ORDER — GLYCOPYRROLATE 0.2 MG/ML
INJECTION INTRAMUSCULAR; INTRAVENOUS AS NEEDED
Status: DISCONTINUED | OUTPATIENT
Start: 2020-05-29 | End: 2020-05-29 | Stop reason: HOSPADM

## 2020-05-29 RX ORDER — DOXAZOSIN 1 MG/1
2 TABLET ORAL DAILY
Status: DISCONTINUED | OUTPATIENT
Start: 2020-05-30 | End: 2020-06-03 | Stop reason: HOSPADM

## 2020-05-29 RX ORDER — OXYCODONE AND ACETAMINOPHEN 5; 325 MG/1; MG/1
1 TABLET ORAL AS NEEDED
Status: DISCONTINUED | OUTPATIENT
Start: 2020-05-29 | End: 2020-05-29 | Stop reason: HOSPADM

## 2020-05-29 RX ORDER — LEVOTHYROXINE SODIUM 100 UG/1
100 TABLET ORAL
Status: DISCONTINUED | OUTPATIENT
Start: 2020-05-30 | End: 2020-06-03 | Stop reason: HOSPADM

## 2020-05-29 RX ORDER — ONDANSETRON 2 MG/ML
INJECTION INTRAMUSCULAR; INTRAVENOUS AS NEEDED
Status: DISCONTINUED | OUTPATIENT
Start: 2020-05-29 | End: 2020-05-29 | Stop reason: HOSPADM

## 2020-05-29 RX ORDER — DEXTROSE, SODIUM CHLORIDE, AND POTASSIUM CHLORIDE 5; .45; .15 G/100ML; G/100ML; G/100ML
75 INJECTION INTRAVENOUS CONTINUOUS
Status: DISCONTINUED | OUTPATIENT
Start: 2020-05-29 | End: 2020-06-01 | Stop reason: SDUPTHER

## 2020-05-29 RX ORDER — SODIUM CHLORIDE 0.9 % (FLUSH) 0.9 %
5-40 SYRINGE (ML) INJECTION EVERY 8 HOURS
Status: DISCONTINUED | OUTPATIENT
Start: 2020-05-29 | End: 2020-06-03 | Stop reason: HOSPADM

## 2020-05-29 RX ADMIN — DEXTROSE MONOHYDRATE, SODIUM CHLORIDE, AND POTASSIUM CHLORIDE 75 ML/HR: 50; 4.5; 1.49 INJECTION, SOLUTION INTRAVENOUS at 16:50

## 2020-05-29 RX ADMIN — SODIUM CHLORIDE, SODIUM LACTATE, POTASSIUM CHLORIDE, AND CALCIUM CHLORIDE: 600; 310; 30; 20 INJECTION, SOLUTION INTRAVENOUS at 11:20

## 2020-05-29 RX ADMIN — PHENYLEPHRINE HYDROCHLORIDE 100 MCG: 10 INJECTION INTRAVENOUS at 08:47

## 2020-05-29 RX ADMIN — SUGAMMADEX 200 MG: 100 INJECTION, SOLUTION INTRAVENOUS at 12:41

## 2020-05-29 RX ADMIN — ACETAMINOPHEN 1000 MG: 10 INJECTION, SOLUTION INTRAVENOUS at 14:20

## 2020-05-29 RX ADMIN — ROCURONIUM BROMIDE 10 MG: 10 INJECTION, SOLUTION INTRAVENOUS at 09:41

## 2020-05-29 RX ADMIN — FENTANYL CITRATE 50 MCG: 50 INJECTION INTRAMUSCULAR; INTRAVENOUS at 07:51

## 2020-05-29 RX ADMIN — Medication 2 G: at 08:29

## 2020-05-29 RX ADMIN — SODIUM CHLORIDE, SODIUM LACTATE, POTASSIUM CHLORIDE, AND CALCIUM CHLORIDE: 600; 310; 30; 20 INJECTION, SOLUTION INTRAVENOUS at 07:40

## 2020-05-29 RX ADMIN — HYDROMORPHONE HYDROCHLORIDE 0.5 MG: 2 INJECTION INTRAMUSCULAR; INTRAVENOUS; SUBCUTANEOUS at 13:35

## 2020-05-29 RX ADMIN — SODIUM CHLORIDE, SODIUM LACTATE, POTASSIUM CHLORIDE, AND CALCIUM CHLORIDE: 600; 310; 30; 20 INJECTION, SOLUTION INTRAVENOUS at 07:50

## 2020-05-29 RX ADMIN — ROCURONIUM BROMIDE 10 MG: 10 INJECTION, SOLUTION INTRAVENOUS at 10:07

## 2020-05-29 RX ADMIN — Medication 10 MG: at 10:32

## 2020-05-29 RX ADMIN — ROCURONIUM BROMIDE 10 MG: 10 INJECTION, SOLUTION INTRAVENOUS at 10:50

## 2020-05-29 RX ADMIN — SODIUM CHLORIDE, SODIUM LACTATE, POTASSIUM CHLORIDE, AND CALCIUM CHLORIDE: 600; 310; 30; 20 INJECTION, SOLUTION INTRAVENOUS at 08:35

## 2020-05-29 RX ADMIN — SODIUM CHLORIDE, SODIUM LACTATE, POTASSIUM CHLORIDE, AND CALCIUM CHLORIDE 25 ML/HR: 600; 310; 30; 20 INJECTION, SOLUTION INTRAVENOUS at 07:01

## 2020-05-29 RX ADMIN — HYDROCODONE BITARTRATE AND ACETAMINOPHEN 1 TABLET: 10; 325 TABLET ORAL at 20:56

## 2020-05-29 RX ADMIN — PHENYLEPHRINE HYDROCHLORIDE 100 MCG: 10 INJECTION INTRAVENOUS at 09:40

## 2020-05-29 RX ADMIN — LIDOCAINE HYDROCHLORIDE 100 MG: 20 INJECTION, SOLUTION EPIDURAL; INFILTRATION; INTRACAUDAL; PERINEURAL at 07:51

## 2020-05-29 RX ADMIN — PHENYLEPHRINE HYDROCHLORIDE 100 MCG: 10 INJECTION INTRAVENOUS at 09:06

## 2020-05-29 RX ADMIN — ALBUMIN HUMAN 250 ML/HR: 50 SOLUTION INTRAVENOUS at 10:52

## 2020-05-29 RX ADMIN — Medication 3 AMPULE: at 07:02

## 2020-05-29 RX ADMIN — ACETAMINOPHEN 1000 MG: 500 TABLET, FILM COATED ORAL at 07:01

## 2020-05-29 RX ADMIN — Medication 10 ML: at 16:55

## 2020-05-29 RX ADMIN — HYDROMORPHONE HYDROCHLORIDE 0.5 MG: 2 INJECTION INTRAMUSCULAR; INTRAVENOUS; SUBCUTANEOUS at 13:15

## 2020-05-29 RX ADMIN — ROCURONIUM BROMIDE 10 MG: 10 INJECTION, SOLUTION INTRAVENOUS at 09:00

## 2020-05-29 RX ADMIN — PHENYLEPHRINE HYDROCHLORIDE 100 MCG: 10 INJECTION INTRAVENOUS at 10:08

## 2020-05-29 RX ADMIN — PROPOFOL 130 MG: 10 INJECTION, EMULSION INTRAVENOUS at 07:51

## 2020-05-29 RX ADMIN — CEFAZOLIN SODIUM 2 G: 100 INJECTION, POWDER, LYOPHILIZED, FOR SOLUTION INTRAVENOUS at 16:50

## 2020-05-29 RX ADMIN — ONDANSETRON 4 MG: 4 TABLET, ORALLY DISINTEGRATING ORAL at 21:09

## 2020-05-29 RX ADMIN — HYDROMORPHONE HYDROCHLORIDE 0.2 MG: 2 INJECTION INTRAMUSCULAR; INTRAVENOUS; SUBCUTANEOUS at 09:33

## 2020-05-29 RX ADMIN — PHENYLEPHRINE HYDROCHLORIDE 50 MCG: 10 INJECTION INTRAVENOUS at 07:51

## 2020-05-29 RX ADMIN — HYDROMORPHONE HYDROCHLORIDE 0.2 MG: 2 INJECTION INTRAMUSCULAR; INTRAVENOUS; SUBCUTANEOUS at 09:51

## 2020-05-29 RX ADMIN — PHENYLEPHRINE HYDROCHLORIDE 100 MCG: 10 INJECTION INTRAVENOUS at 10:21

## 2020-05-29 RX ADMIN — CYCLOBENZAPRINE 5 MG: 10 TABLET, FILM COATED ORAL at 15:57

## 2020-05-29 RX ADMIN — ROCURONIUM BROMIDE 40 MG: 10 INJECTION, SOLUTION INTRAVENOUS at 07:51

## 2020-05-29 RX ADMIN — GABAPENTIN 300 MG: 300 CAPSULE ORAL at 21:07

## 2020-05-29 RX ADMIN — HYDROMORPHONE HYDROCHLORIDE 0.5 MG: 2 INJECTION INTRAMUSCULAR; INTRAVENOUS; SUBCUTANEOUS at 13:25

## 2020-05-29 RX ADMIN — GLYCOPYRROLATE 0.2 MG: 0.2 INJECTION, SOLUTION INTRAMUSCULAR; INTRAVENOUS at 08:32

## 2020-05-29 RX ADMIN — HYDROMORPHONE HYDROCHLORIDE 0.5 MG: 2 INJECTION INTRAMUSCULAR; INTRAVENOUS; SUBCUTANEOUS at 13:30

## 2020-05-29 RX ADMIN — ROCURONIUM BROMIDE 10 MG: 10 INJECTION, SOLUTION INTRAVENOUS at 08:41

## 2020-05-29 RX ADMIN — Medication 1 AMPULE: at 20:55

## 2020-05-29 RX ADMIN — FENTANYL CITRATE 50 MCG: 50 INJECTION INTRAMUSCULAR; INTRAVENOUS at 08:36

## 2020-05-29 RX ADMIN — HYDROMORPHONE HYDROCHLORIDE 0.2 MG: 2 INJECTION INTRAMUSCULAR; INTRAVENOUS; SUBCUTANEOUS at 09:45

## 2020-05-29 RX ADMIN — HYDROCODONE BITARTRATE AND ACETAMINOPHEN 1 TABLET: 10; 325 TABLET ORAL at 15:58

## 2020-05-29 RX ADMIN — CEFAZOLIN SODIUM 2 G: 100 INJECTION, POWDER, LYOPHILIZED, FOR SOLUTION INTRAVENOUS at 21:07

## 2020-05-29 RX ADMIN — HYDROMORPHONE HYDROCHLORIDE 1 MG: 1 INJECTION, SOLUTION INTRAMUSCULAR; INTRAVENOUS; SUBCUTANEOUS at 17:29

## 2020-05-29 RX ADMIN — PHENYLEPHRINE HYDROCHLORIDE 10 MCG/MIN: 10 INJECTION INTRAVENOUS at 10:26

## 2020-05-29 RX ADMIN — ONDANSETRON 4 MG: 2 INJECTION INTRAMUSCULAR; INTRAVENOUS at 11:47

## 2020-05-29 RX ADMIN — Medication 2 G: at 12:08

## 2020-05-29 RX ADMIN — ALBUMIN HUMAN 250 ML/HR: 50 SOLUTION INTRAVENOUS at 10:17

## 2020-05-29 RX ADMIN — Medication 5 ML: at 22:00

## 2020-05-29 RX ADMIN — DEXAMETHASONE SODIUM PHOSPHATE 4 MG: 4 INJECTION, SOLUTION INTRAMUSCULAR; INTRAVENOUS at 11:47

## 2020-05-29 NOTE — BRIEF OP NOTE
Brief Postoperative Note    Patient: Jaime Donovan  YOB: 1935  MRN: [id number]    Date of Procedure: 5/29/2020     Pre-Op Diagnosis: Lumbar stenosis with neurogenic claudication [M48.062]    Post-Op Diagnosis:spinal stenosis T11,T12,L1 and prior instrumented fusion L1-L3    Procedure(s):  T11-L1 LAMINECTOMY/ T9-L2 FUSION    Surgeon(s):  Steven Brown MD    Surgical Assistant: None    Anesthesia: General     Estimated Blood Loss (YD):[id number]}    Complications: none    Specimens: * No specimens in log *     Implants:   Implant Name Type Inv.  Item Serial No.  Lot No. LRB No. Used Action   10cc ProteiOS   [id number]  10CC PROTEIOS N/A 1 Implanted   30cc Cancellous chips   [id number] NURIA CRISSY  N/A 1 Implanted   GRAFT BNE DBM PTTY W/CHIPS 5ML -- BIO DBM - [id number]  GRAFT BNE DBM PTTY W/CHIPS 5ML -- BIO DBM  NURIA SPINE HOW [id number] N/A 1 Implanted   GRAFT BNE DBM PTTY W/CHIPS 5ML -- BIO DBM - [id number]  GRAFT BNE DBM PTTY W/CHIPS 5ML -- BIO DBM  NURIA SPINE HOW [id number] N/A 1 Implanted   GRAFT BNE DBM PTTY W/CHIPS 5ML -- BIO DBM - [id number]  GRAFT BNE DBM PTTY W/CHIPS 5ML -- BIO DBM  NURIA SPINE HOW [id number] N/A 1 Implanted   GRAFT BNE DBM PTTY W/CHIPS 5ML -- BIO DBM - [id number]  GRAFT BNE DBM PTTY W/CHIPS 5ML -- BIO DBM  NURIA SPINE HOW [id number] N/A 1 Implanted   GRAFT BNE DBM PTTY W/CHIPS 5ML -- BIO DBM - [id number]  GRAFT BNE DBM PTTY W/CHIPS 5ML -- BIO DBM  NURIA SPINE HOW [id number] N/A 1 Implanted   GRAFT BNE DBM PTTY W/CHIPS 5ML -- BIO DBM - [id number]  GRAFT BNE DBM PTTY W/CHIPS 5ML -- BIO DBM  NURIA SPINE HOWM [id number] N/A 1 Implanted   BLOCKER SPNE HAMZAH 3 TI --  - [id number]  BLOCKER SPNE HAMZAH 3 TI --   NURIA SPINE HOWM [id number] N/A 8 Implanted   SCR SPNE POLYAXL 5.5X45MM -- DELGADO - [id number]  SCR SPNE POLYAXL 5.5X45MM -- DELGADO  NURIA SPINE HOWM [id number] N/A 4 Implanted   SCR SPNE POLYAXL 6.5X45MM -- DELGADO - [id number]  SCR SPNE POLYAXL 6.5X45MM -- Upson Andrew SPINE HOWM [id number] N/A 4 Implanted   5.5 x480 damien      [id number] N/A 1 Implanted   CONN SPNE HAMZAH 3 CRSLNK 43MM --  - [id number]  CONN SPNE HAMZAH 3 CRSLNK 43MM --   Melissa Horton HOWM [id number] N/A 1 Implanted       Drains:large Hemovac    Findings: severe spinal stenosis    Electronically Signed by Joe Rosenthal MD on 5/29/2020 at 12:31 PM

## 2020-05-29 NOTE — PROGRESS NOTES
TRANSFER - IN REPORT:    Verbal report received from [facility] on [address]  being received from PACU for routine progression of care      Report consisted of patients Situation, Background, Assessment and   Recommendations(SBAR). Information from the following report(s) SBAR and MAR was reviewed with the receiving nurse. Opportunity for questions and clarification was provided. Assessment completed upon patients arrival to unit and care assumed.

## 2020-05-29 NOTE — PROGRESS NOTES
TRANSFER - OUT REPORT:    Verbal report given to Wally Calle RN on [address]  being transferred to 7th floor for routine progression of care       Report consisted of patients Situation, Background, Assessment and Recommendations(SBAR). Information from the following report(s) SBAR, Kardex, OR Summary, Intake/Output and MAR was reviewed with the receiving nurse. Opportunity for questions and clarification was provided. Pt's wife is in room and has been updated by phone.

## 2020-05-29 NOTE — PROGRESS NOTES
05/29/20 1537   Dual Skin Pressure Injury Assessment   Dual Skin Pressure Injury Assessment WDL   Second Care Provider (Based on [address] [place] [address]) Kalin Rosas RN   Skin Integumentary   Skin Integumentary (WDL) X   Skin Color Appropriate for ethnicity   Skin Condition/Temp Warm;Dry;Fragile   Skin Integrity Incision (comment)  (back)   Turgor Epidermis thin w/ loss of subcut tissue   Wound Prevention and Protection Methods   Orientation of Wound Prevention Posterior   Location of Wound Prevention Sacrum/Coccyx   Dressing Present  No   Wound Offloading (Prevention Methods) Bed, pressure reduction mattress

## 2020-05-29 NOTE — ANESTHESIA POSTPROCEDURE EVALUATION
Procedure(s):  T11-L1 LAMINECTOMY/ T9-L1 FUSION. general    Anesthesia Post Evaluation      Multimodal analgesia: multimodal analgesia used between 6 hours prior to anesthesia start to PACU discharge  Patient location during evaluation: PACU  Patient participation: complete - patient participated  Level of consciousness: awake and alert  Pain management: adequate  Airway patency: patent  Anesthetic complications: no  Cardiovascular status: acceptable  Respiratory status: acceptable  Hydration status: acceptable  Post anesthesia nausea and vomiting:  none      INITIAL Post-op Vital signs:   Vitals Value Taken Time   /63 5/29/2020  2:59 PM   Temp 36.6 °C (97.9 °F) 5/29/2020  3:03 PM   Pulse 101 5/29/2020  3:18 PM   Resp 16 5/29/2020  3:03 PM   SpO2 97 % 5/29/2020  3:18 PM   Vitals shown include unvalidated device data.

## 2020-05-29 NOTE — ANESTHESIA PREPROCEDURE EVALUATION
Relevant Problems   No relevant active problems       Anesthetic History   No history of anesthetic complications            Review of Systems / Medical History  Patient summary reviewed and pertinent labs reviewed    Pulmonary  Within defined limits                 Neuro/Psych   Within defined limits          Comments: CLBP Cardiovascular    Hypertension: well controlled          CAD and cardiac stents    Exercise tolerance: >4 METS  Comments: KLEBER x3 LAD in 2017, x3 RCA in 2016, has stayed on 81 mg ASA   GI/Hepatic/Renal     GERD: well controlled           Endo/Other      Hypothyroidism: well controlled       Other Findings              Physical Exam    Airway  Mallampati: II  TM Distance: < 4 cm  Neck ROM: normal range of motion   Mouth opening: Normal    Comments: Prominent overbite Cardiovascular    Rhythm: regular           Dental  No notable dental hx       Pulmonary  Breath sounds clear to auscultation               Abdominal  GI exam deferred       Other Findings            Anesthetic Plan    ASA: 3  Anesthesia type: general    Monitoring Plan: Arterial line        Anesthetic plan and risks discussed with: Patient

## 2020-05-30 LAB
ANION GAP SERPL CALC-SCNC: 9 MMOL/L (ref 7–16)
BUN SERPL-MCNC: 11 MG/DL (ref 8–23)
CALCIUM SERPL-MCNC: 8.7 MG/DL (ref 8.3–10.4)
CHLORIDE SERPL-SCNC: 104 MMOL/L (ref 98–107)
CO2 SERPL-SCNC: 24 MMOL/L (ref 21–32)
CREAT SERPL-MCNC: 0.9 MG/DL (ref 0.8–1.5)
GLUCOSE SERPL-MCNC: 142 MG/DL (ref 65–100)
POTASSIUM SERPL-SCNC: 4.1 MMOL/L (ref 3.5–5.1)
SODIUM SERPL-SCNC: 137 MMOL/L (ref 136–145)

## 2020-05-30 PROCEDURE — 36415 COLL VENOUS BLD VENIPUNCTURE: CPT

## 2020-05-30 PROCEDURE — 74011250637 HC RX REV CODE- 250/637: Performed by: ORTHOPAEDIC SURGERY

## 2020-05-30 PROCEDURE — 74011000250 HC RX REV CODE- 250: Performed by: ORTHOPAEDIC SURGERY

## 2020-05-30 PROCEDURE — 65270000029 HC RM PRIVATE

## 2020-05-30 PROCEDURE — 80048 BASIC METABOLIC PNL TOTAL CA: CPT

## 2020-05-30 PROCEDURE — 97110 THERAPEUTIC EXERCISES: CPT

## 2020-05-30 PROCEDURE — 74011250636 HC RX REV CODE- 250/636: Performed by: ORTHOPAEDIC SURGERY

## 2020-05-30 PROCEDURE — 97162 PT EVAL MOD COMPLEX 30 MIN: CPT

## 2020-05-30 PROCEDURE — 97116 GAIT TRAINING THERAPY: CPT

## 2020-05-30 RX ADMIN — HYDROMORPHONE HYDROCHLORIDE 1 MG: 1 INJECTION, SOLUTION INTRAMUSCULAR; INTRAVENOUS; SUBCUTANEOUS at 10:25

## 2020-05-30 RX ADMIN — Medication 10 ML: at 13:02

## 2020-05-30 RX ADMIN — MAGNESIUM HYDROXIDE 30 ML: 2400 SUSPENSION ORAL at 08:54

## 2020-05-30 RX ADMIN — ASPIRIN 81 MG 81 MG: 81 TABLET ORAL at 08:54

## 2020-05-30 RX ADMIN — ONDANSETRON 4 MG: 4 TABLET, ORALLY DISINTEGRATING ORAL at 06:45

## 2020-05-30 RX ADMIN — GABAPENTIN 300 MG: 300 CAPSULE ORAL at 22:08

## 2020-05-30 RX ADMIN — LEVOTHYROXINE SODIUM 100 MCG: 100 TABLET ORAL at 06:11

## 2020-05-30 RX ADMIN — Medication 1 AMPULE: at 22:08

## 2020-05-30 RX ADMIN — CEFAZOLIN SODIUM 2 G: 100 INJECTION, POWDER, LYOPHILIZED, FOR SOLUTION INTRAVENOUS at 06:11

## 2020-05-30 RX ADMIN — GABAPENTIN 300 MG: 300 CAPSULE ORAL at 08:54

## 2020-05-30 RX ADMIN — TRAMADOL HYDROCHLORIDE 50 MG: 50 TABLET, FILM COATED ORAL at 15:38

## 2020-05-30 RX ADMIN — DEXTROSE MONOHYDRATE, SODIUM CHLORIDE, AND POTASSIUM CHLORIDE 75 ML/HR: 50; 4.5; 1.49 INJECTION, SOLUTION INTRAVENOUS at 06:45

## 2020-05-30 RX ADMIN — DOXAZOSIN 2 MG: 1 TABLET ORAL at 08:54

## 2020-05-30 RX ADMIN — DEXTROSE MONOHYDRATE, SODIUM CHLORIDE, AND POTASSIUM CHLORIDE 75 ML/HR: 50; 4.5; 1.49 INJECTION, SOLUTION INTRAVENOUS at 22:49

## 2020-05-30 RX ADMIN — LISINOPRIL 5 MG: 5 TABLET ORAL at 08:54

## 2020-05-30 RX ADMIN — HYDROCODONE BITARTRATE AND ACETAMINOPHEN 1 TABLET: 10; 325 TABLET ORAL at 12:47

## 2020-05-30 RX ADMIN — Medication 1 AMPULE: at 08:54

## 2020-05-30 RX ADMIN — Medication 5 ML: at 22:09

## 2020-05-30 RX ADMIN — HYDROCODONE BITARTRATE AND ACETAMINOPHEN 1 TABLET: 10; 325 TABLET ORAL at 06:11

## 2020-05-30 RX ADMIN — HYDROCODONE BITARTRATE AND ACETAMINOPHEN 1 TABLET: 10; 325 TABLET ORAL at 22:08

## 2020-05-30 RX ADMIN — GABAPENTIN 300 MG: 300 CAPSULE ORAL at 15:38

## 2020-05-30 RX ADMIN — HYDROMORPHONE HYDROCHLORIDE 1 MG: 1 INJECTION, SOLUTION INTRAMUSCULAR; INTRAVENOUS; SUBCUTANEOUS at 00:46

## 2020-05-30 RX ADMIN — ONDANSETRON 4 MG: 4 TABLET, ORALLY DISINTEGRATING ORAL at 12:56

## 2020-05-30 RX ADMIN — PANTOPRAZOLE SODIUM 40 MG: 40 TABLET, DELAYED RELEASE ORAL at 06:11

## 2020-05-30 NOTE — PROGRESS NOTES
Problem: Mobility Impaired (Adult and Pediatric)  Goal: *Acute Goals and Plan of Care (Insert Text)  Description: Goals:  (1.)Mr. Donovan will move from supine to sit and sit to supine , scoot up and down and roll side to side with MODIFIED INDEPENDENCE within 5 treatment day(s). (2.)Mr. Donovan will tolerate sitting up on the EOB for 5-8 minutes without support within 5 treatment day(s)  (3.)Mr. Donovan will participate in BLE AAROM strength exercises in supine and sitting 2 x 10 reps within 5 treatment day(s)  (4). Mr. Matthew Lorenzo will transfer sit to stand and SPT with CGA using the r/walker within 5 treatment day(s)  (5.) Mr. Matthew Lorenzo will ambulate with LRAD for ' with LRAD with CGA within 5 treatment day(s)      PHYSICAL THERAPY: Daily Note and PM 5/30/2020  INPATIENT:    [place] / [place] / Product Type: Medicare /       NAME/AGE/GENDER: Scott Mccain is a 82 y.o. male   PRIMARY DIAGNOSIS: Lumbar stenosis with neurogenic claudication [M48.062]  Spinal stenosis [M48.00] Spinal stenosis, lumbar region, with neurogenic claudication Spinal stenosis, lumbar region, with neurogenic claudication  Procedure(s) (LRB):  T11-L1 LAMINECTOMY/ T9-L1 FUSION (N/A)  1 Day Post-Op  ICD-10: Treatment Diagnosis:    · Generalized Muscle Weakness (M62.81)  · Difficulty in walking, Not elsewhere classified (R26.2)  · Other abnormalities of gait and mobility (R26.89)  · Low Back Pain (M54.5)   Precaution/Allergies:  Lipitor [atorvastatin] and Plavix [clopidogrel]      ASSESSMENT:         Patient presents in supine with his wife at the bedside. He reports he was able to sleep some after being given something for nausea earlier this afternoon. He participated in BLE AROM exercises in supine and stated it felt good. Supine to sit was moderate assist with use of bed controls to raise the [facility] secondary to intolerance with rolling right or left. Static sitting was good.   Sit to stand required CGA with use of bed elevation. Static standing with the r/walker was steady. Patient was able to manage 5 steps in place with CGA using the r/walker. He became dizzy and started to feel sick at his stomach and was returned to a seated position on the bed. He was assisted sit to supine with moderate assist x 2 (his wife helping at the shoulders). He was positioned for comfort with his call light and personal items in reach. He continued to feel sick and was given ice, wet wash cloth and bag for use if needed. After several minutes his symptoms subsided and he stated he felt better. This section established at most recent assessment   PROBLEM LIST (Impairments causing functional limitations):  1. Decreased Strength  2. Decreased ADL/Functional Activities  3. Decreased Transfer Abilities  4. Decreased Ambulation Ability/Technique  5. Increased Pain  6. Decreased Activity Tolerance   INTERVENTIONS PLANNED: (Benefits and precautions of physical therapy have been discussed with the patient.)  1. Bed Mobility  2. Gait Training  3. Therapeutic Activites  4. Therapeutic Exercise/Strengthening  5. Transfer Training     TREATMENT PLAN: Frequency/Duration: twice daily for duration of hospital stay  Rehabilitation Potential For Stated Goals: Good     REHAB RECOMMENDATIONS (at time of discharge pending progress):    Placement: It is my opinion, based on this patient's performance to date, that Mr. Donovan may benefit from participating in 1-2 additional therapy sessions in order to continue to assess for rehab potential and then make recommendation for disposition at discharge. Equipment:    None at this time              HISTORY:   History of Present Injury/Illness (Reason for Referral): This is an 27-year-old who had multiple surgeries by Dr. Gabriela Frey. At this point he is fused L1 to S1, but he has instrumentation just at L1-L2-L3 and then a single screw at L5 on one side.   He had done well for several years but started having recurrent symptoms. Sounds like stenosis, and an MRI scan did show stenosis at T12, L1, and he would get better with epidural blocks, but it would wear off. It has gotten progressively worse, and he is very limited on his ability to stand and walk. We have been planning to do a fusion for a long time, extend this fusion up, but because of the COVID infection this had to be postponed. He has required narcotics monthly to manage this. We got him scheduled. We are going to decompress the T12/L1 level. We might even check up T11-T12, but we are going to add pedicle screws T10 down and connect the rods. Hopefully this will give him satisfactory relief. He tells me today that he has had some pain in the SI joints for a while, and he gets regular SI joint injections from Dr. Tammie Bryan and wanted to know about the possibility of an SI joint fusion. I discussed it with him. In general though I think it might make him extremely stiff. I would be wary about it, but certainly I think he needs to get over this surgery and see how he feels and see if that is still an issue. Past Medical History/Comorbidities:   Mr. Tanner Weiss  has a past medical history of BPH (benign prostatic hyperplasia), CAD (coronary artery disease) (2016), Chronic pain, GERD (gastroesophageal reflux disease), Hypertension, Kidney stone, Spinal stenosis, and Thyroid disease. He also has no past medical history of Difficult intubation, Malignant hyperthermia due to anesthesia, Nausea & vomiting, or Pseudocholinesterase deficiency. Mr. Tanner Weiss  has a past surgical history that includes hx appendectomy (1957); hx heart catheterization (,2016, 2017); neurological procedure unlisted (2001); hx lumbar fusion (2002); hx lumbar fusion (2006); hx lumbar fusion; hx cataract removal (Bilateral); and hx colonoscopy.   Social History/Living Environment:   Home Environment: Private residence  # Steps to Enter: 1  Wheelchair Ramp: No  One/Two Story Residence: One story  Living Alone: No  Support Systems: Spouse/Significant Other/Partner  Patient Expects to be Discharged to[de-identified] Private residence  Current DME Used/Available at Home: Camille , rolling, Cane, straight, Wheelchair  Prior Level of Function/Work/Activity:  Independent with use of a RLE AFO without an assistive device. Number of Personal Factors/Comorbidities that affect the Plan of Care: 1-2: MODERATE COMPLEXITY   EXAMINATION:   Most Recent Physical Functioning:   Gross Assessment:  AROM: Generally decreased, functional  PROM: Generally decreased, functional  Strength: Generally decreased, functional               Posture:     Balance:  Sitting: Intact  Standing: Impaired  Standing - Static: Fair;Good;Constant support  Standing - Dynamic : Fair;Good;Constant support Bed Mobility:  Rolling: Other (comment)(bed controls used to assist.  Could not tolerate rolling)  Supine to Sit: Moderate assistance  Sit to Supine: Moderate assistance  Scooting: Minimum assistance  Wheelchair Mobility:     Transfers: Unable to assess secondary to pain  Sit to Stand: Contact guard assistance(bed elevation used to assist)  Stand to Sit: Contact guard assistance  Gait: Unable to assess secondary to pain     Base of Support: Narrowed  Speed/Gilda: Slow  Step Length: Left shortened;Right shortened  Distance (ft): 5 Feet (ft)  Assistive Device: Walker, rolling  Ambulation - Level of Assistance: Contact guard assistance  Interventions: Verbal cues      Body Structures Involved:  1. Bones  2. Joints  3. Muscles Body Functions Affected:  1. Neuromusculoskeletal  2. Movement Related Activities and Participation Affected:  1. General Tasks and Demands  2. Mobility  3.  Self Care   Number of elements that affect the Plan of Care: 3: MODERATE COMPLEXITY   CLINICAL PRESENTATION:   Presentation: Evolving clinical presentation with changing clinical characteristics: MODERATE COMPLEXITY   CLINICAL DECISION MAKIN [facility] [id number] AM-PAC 5 Clicks   Basic Mobility Inpatient Short Form  How much difficulty does the patient currently have. .. Unable A Lot A Little None   1. Turning over in bed (including adjusting bedclothes, sheets and blankets)? [x] 1   [] 2   [] 3   [] 4   2. Sitting down on and standing up from a chair with arms ( e.g., wheelchair, bedside commode, etc.)   [x] 1   [] 2   [] 3   [] 4   3. Moving from lying on back to sitting on the side of the bed? [] 1   [x] 2   [] 3   [] 4   How much help from another person does the patient currently need. .. Total A Lot A Little None   4. Moving to and from a bed to a chair (including a wheelchair)? [x] 1   [] 2   [] 3   [] 4   5. Need to walk in hospital room? [x] 1   [] 2   [] 3   [] 4   6. Climbing 3-5 steps with a railing? [x] 1   [] 2   [] 3   [] 4   © 2007, Trustees of [facility], under license to [employer]. All rights reserved      Score:  Initial: 8 Most Recent: X (Date: -- )    Interpretation of Tool:  Represents activities that are increasingly more difficult (i.e. Bed mobility, Transfers, Gait). Medical Necessity:     · Patient is expected to demonstrate progress in   · strength and functional technique  ·  to   · increase independence with bed mobility, SPT and gait with LRAD   · . Reason for Services/Other Comments:  · Patient continues to require skilled intervention due to   · medical complications  · . Use of outcome tool(s) and clinical judgement create a POC that gives a: Questionable prediction of patient's progress: MODERATE COMPLEXITY            TREATMENT:   (In addition to Assessment/Re-Assessment sessions the following treatments were rendered)   Pre-treatment Symptoms/Complaints:  Patient reports pain at 6/10 pre therapy and asked for pain medication after therapy.   Pain: Initial:   Pain Intensity 1: 6  Pain Location 1: Back  Pain Orientation 1: Mid  Post Session:  6/10     Gait Training ( 10 mins ):  Gait training to improve and/or restore physical functioning as related to mobility, strength and dynamic movement of leg - bilateral to improve functional mobility and transitional movements. Ambulated 5 Feet (ft) with Contact guard assistance using a Walker, rolling and minimal Verbal cues related tosupine to sit and sit to stand to promote proper body mechanics. Therapeutic Exercise: ( 15 mins):  Exercises per grid below to improve mobility and strength. Required minimal verbal cues to promote proper body alignment and promote proper body mechanics. Braces/Orthotics/Lines/Etc:   · IV  · O2 Device: Nasal cannula  Treatment/Session Assessment:    · Response to Treatment: Patient participated well and tolerated therapy better this afternoon. · Interdisciplinary Collaboration:   o Physical Therapist  o Registered Nurse  · After treatment position/precautions:   o Supine in bed  o Bed in low position  o Call light within reach  o RN notified  o Family at bedside   · Compliance with Program/Exercises: Will assess as treatment progresses  · Recommendations/Intent for next treatment session: \"Next visit will focus on advancements to more challenging activities and reduction in assistance provided\".   Total Treatment Duration:  PT Patient Time In/Time Out  Time In: 1501  Time Out: 1625 [facility] Delorise Bullion

## 2020-05-30 NOTE — OP NOTES
[facility]  OPERATIVE REPORT    Name:  Eric March  MR#:  [id number]  :  1935  ACCOUNT #:  [de-identified]  DATE OF SERVICE:  2020    PREOPERATIVE DIAGNOSIS:  Thoracolumbar spinal stenosis with previous L1 to L3 instrumented fusion and segmental instability above the fusion. POSTOPERATIVE DIAGNOSIS:  Thoracolumbar spinal stenosis with previous L1 to L3 instrumented fusion and segmental instability above the fusion. PROCEDURES PERFORMED:  1.  Bilateral T11, T12 and L1 laminectomy, partial facetectomy. CPT codes 30461, 93827I1  2. Placement of segmental spinal instrumentation T9 through L3. CPT code 08127  3. Posterolateral fusion T9 through L2. CPT codes 02793, 09820K5  4. Local bone graft harvest with supplementation using OsteoAMP bone graft substitute. CPT code 51796    SURGEON:  Jarvis Magallanes MD    ASSISTANT:  Staff. ANESTHESIA:  GETA. COMPLICATIONS:  None. SPECIMENS REMOVED:  None. IMPLANTS:  All [place]. ESTIMATED BLOOD LOSS:  400 mL. FLUIDS:  1200 mL crystalloid. DRAINS:  One large Hemovac. INDICATIONS:  The patient is an 61-year-old gentleman who has had a previous back surgery several times and infused L1 down to the sacrum, but he only had instrumentation currently L1 through L3 and had done well, but slowly overtime had developed segmental instability with recurrent stenosis above his fusion. We had managed him with epidural blocks for a period of time, but eventually this was unsuccessful and his activity and mobility level had markedly decreased and he was requiring narcotic pain medication, so he was brought for surgical treatment. PROCEDURE:  The patient was brought to the operating room. After administration of anesthesia, IV antibiotic and placement of monitoring lines, he was positioned prone on the [facility] frame. His back was prepped with Betadine and sterilely draped.   At this point surgeon called a time-out and confirmed the procedure to be performed, his allergy history and the fact that he had received his preoperative IV antibiotics. C-arm was brought in. We identified the T9-10 level, marked it on the skin and also marked the level of the inferior aspect of the instrumentation. We then made an incision between these two areas in the midline of the spine with a scalpel and dissected down through the subcutaneous tissue with electrocautery to the deep fascia and we cauterized the bleeders as we went with the electrocautery. We then incised through the deep fascia on either side of the spinous processes and dissected down along the lamina out to the facet joint bilaterally. Inferiorly we identified the previously placed instrumentation and exposed it cephalad. We exposed the dorsal surface of the bone out to the transverse process bilaterally. Once we had exposed everything we used the Medtronic instruments to remove the locking nuts and then the rods from the retained instrumentation, which was Medtronic Legacy from L1 to L3. We left the screws in place. Then, we elected to place further instrumentation to extend the fusion. We elected to go to T9 and we used Butler pedicle screws. We made a starting hole with a bur and then inserted Steffee probe down to this, probed it and tapped it with a 5.0 mm tap and placed 5.5 diameter screws 45 mm in length at T9 and T10 and at T11 and T12 we placed 6.5, 45-mm length screws; that was at T11 and T12 bilaterally. There were no obvious breaches in the pedicle. We checked with AP and lateral C-arm x-rays to make sure these were well positioned. Once that was done, we removed the spinous processes from T11 down to L1 and burred down the remaining lamina. There was extensive scarring right up at the L1-2 area and there was some remaining lamina at that area.  So, we started cephalad and used Kerrison to remove lamina and ligamentum and undercut the facet and the thecal sac was initially very compressed, but expanded nicely. We continued the dissection down from T11 down to L1 and the previous surgical area and lamina. At this point, we had to very carefully loosen the remaining bone from the scar tissue and the thecal sac and we excised that with the Kerrison. Eventually we completed this without suffering a dural tear. There was great improvement of the thecal sac compression with our decompression. We could find no further areas of neural compromise, so we then thoroughly irrigated the wound multiple times, suctioned it dry. We cut rods of appropriate length, fashioned with a damien zuluaga to fit into the screws and then placed the damien into the screws bilaterally and placed the locking nuts. We replaced the Medtronic locking nuts at L1 through L3, but placed the [place] locking nuts at T9 through T12 and then we securely tightened it with a torque wrench, took a final AP and lateral C-arm x-ray which seemed acceptable. No obvious problems. We then decorticated the spine from L2 to T9 with a high-speed bur. We had obtained 30 mL of OsteoAMP bone graft substitute. We mixed it with the local bone graft in the decompression and we placed it on the dorsal and lateral bones of the spine from T9 to L2. We did place FloSeal over the laminectomy site prior to this to keep the bone graft from falling into the canal.  After we placed the bone graft we placed demineralized bone matrix putty on top of this to keep it into place and we suctioned out the canal and then placed more FloSeal loosely over the decompression site. We placed a cross connector right over the T11-12 area and secured it to the damien bilaterally and tightened it. At this point, we had essentially completed the  procedure. We placed a large Hemovac drain in the wound and brought out through a separate proximal stab incision. We placed vancomycin powder in the wound.   Then we closed the deep fascia with interrupted figure-of-eight stitches of #1 Vicryl. We closed the subcutaneous tissue with interrupted stitches of 2-0 Vicryl. The skin was closed with a running subcuticular stitch of 3-0 Vicryl. Steri-Strips, dry sterile dressings were applied and the patient was then rolled supine onto the recovery room bed having tolerated the procedure well.         Simba Medina MD      [id number]  D:  05/29/2020 13:04  T:  05/29/2020 23:43  JOB #:  [id number]  CC:  Ligia Peace MD

## 2020-05-30 NOTE — PROGRESS NOTES
Problem: Mobility Impaired (Adult and Pediatric)  Goal: *Acute Goals and Plan of Care (Insert Text)  Description: Goals:  (1.)Mr. Donovan will move from supine to sit and sit to supine , scoot up and down and roll side to side with MODIFIED INDEPENDENCE within 5 treatment day(s). (2.)Mr. Donovan will tolerate sitting up on the EOB for 5-8 minutes without support within 5 treatment day(s)  (3.)Mr. Donovan will participate in BLE AAROM strength exercises in supine and sitting 2 x 10 reps within 5 treatment day(s)  (4). Mr. Rudy Bird will transfer sit to stand and SPT with CGA using the r/walker within 5 treatment day(s)  (5.) Mr. Rudy Bird will ambulate with LRAD for ' with LRAD with CGA within 5 treatment day(s)      PHYSICAL THERAPY: Initial Assessment and AM 5/30/2020  INPATIENT:    [place] / Product Type: Medicare /       NAME/AGE/GENDER: Zelalem Ibanez is a 82 y.o. male   PRIMARY DIAGNOSIS: Lumbar stenosis with neurogenic claudication [M48.062]  Spinal stenosis [M48.00] Spinal stenosis, lumbar region, with neurogenic claudication Spinal stenosis, lumbar region, with neurogenic claudication  Procedure(s) (LRB):  T11-L1 LAMINECTOMY/ T9-L1 FUSION (N/A)  1 Day Post-Op  ICD-10: Treatment Diagnosis:    · Generalized Muscle Weakness (M62.81)  · Difficulty in walking, Not elsewhere classified (R26.2)  · Other abnormalities of gait and mobility (R26.89)  · Low Back Pain (M54.5)   Precaution/Allergies:  Lipitor [atorvastatin] and Plavix [clopidogrel]      ASSESSMENT:     Mr. Rudy Bird is an 82year old WM s/p T11- L1 laminectomy/ T-9- L1 fusion secondary to lumbar stenosis with neurogenic claudication. His PMH includes BPH, CAD, Chronic pain, GERD, HTN and spinal stenosis. He presents today in supine with his wife at the bedside. He agreed to try PT but states that his pain is 8/10 after having pain medication.   He reports he lives with his wife in a 1 level home with a 1 step entry.  His PLOF has been independent without an assistive device but he does have a RLE AFO for DF weakness from his previous back surgeries. His BLE AAROM is WFLs in supine and sitting. Supine to sit was taken very slow with use of the bed controls but he could only tolerate ~ 40 degrees of bed elevation. He required moderate assist for support to come all the way up into sitting. He could not tolerate rolling side to side. He sat on the EOB for ~ 1-2 minutes working on moving his BLES before asking to return to supine secondary to pain. Sit to supine was moderate assist with poor control going back into supine. He was positioned for comfort with his call light in reach and his wife at the bedside. He did state that moving from supine to sit did not hurt as much as he anticipated.  would benefit from continued skilled PT to maximize his functional abilities while in the hospital. He was kind and cooperative despite having so much pain. This section established at most recent assessment   PROBLEM LIST (Impairments causing functional limitations):  1. Decreased Strength  2. Decreased ADL/Functional Activities  3. Decreased Transfer Abilities  4. Decreased Ambulation Ability/Technique  5. Increased Pain  6. Decreased Activity Tolerance   INTERVENTIONS PLANNED: (Benefits and precautions of physical therapy have been discussed with the patient.)  1. Bed Mobility  2. Gait Training  3. Therapeutic Activites  4. Therapeutic Exercise/Strengthening  5. Transfer Training     TREATMENT PLAN: Frequency/Duration: twice daily for duration of hospital stay  Rehabilitation Potential For Stated Goals: Good     REHAB RECOMMENDATIONS (at time of discharge pending progress):    Placement: It is my opinion, based on this patient's performance to date, that Mr. Donovan may benefit from participating in 1-2 additional therapy sessions in order to continue to assess for rehab potential and then make recommendation for disposition at discharge. Equipment:    None at this time              HISTORY:   History of Present Injury/Illness (Reason for Referral): This is an 61-year-old who had multiple surgeries by Dr. Saturnino Nissen. At this point he is fused L1 to S1, but he has instrumentation just at L1-L2-L3 and then a single screw at L5 on one side. He had done well for several years but started having recurrent symptoms. Sounds like stenosis, and an MRI scan did show stenosis at T12, L1, and he would get better with epidural blocks, but it would wear off. It has gotten progressively worse, and he is very limited on his ability to stand and walk. We have been planning to do a fusion for a long time, extend this fusion up, but because of the COVID infection this had to be postponed. He has required narcotics monthly to manage this. We got him scheduled. We are going to decompress the T12/L1 level. We might even check up T11-T12, but we are going to add pedicle screws T10 down and connect the rods. Hopefully this will give him satisfactory relief. He tells me today that he has had some pain in the SI joints for a while, and he gets regular SI joint injections from Dr. Livia Sacks and wanted to know about the possibility of an SI joint fusion. I discussed it with him. In general though I think it might make him extremely stiff. I would be wary about it, but certainly I think he needs to get over this surgery and see how he feels and see if that is still an issue. Past Medical History/Comorbidities:   Mr. Robinson Deleon  has a past medical history of BPH (benign prostatic hyperplasia), CAD (coronary artery disease) (2016), Chronic pain, GERD (gastroesophageal reflux disease), Hypertension, Kidney stone, Spinal stenosis, and Thyroid disease. He also has no past medical history of Difficult intubation, Malignant hyperthermia due to anesthesia, Nausea & vomiting, or Pseudocholinesterase deficiency.   Mr. Robinson Deleon  has a past surgical history that includes hx appendectomy (); hx heart catheterization (,2016, 2017); neurological procedure unlisted (); hx lumbar fusion (); hx lumbar fusion (); hx lumbar fusion; hx cataract removal (Bilateral); and hx colonoscopy. Social History/Living Environment:   Home Environment: Private residence  # Steps to Enter: 1  Wheelchair Ramp: No  One/Two Story Residence: One story  Living Alone: No  Support Systems: Spouse/Significant Other/Partner  Patient Expects to be Discharged to[de-identified] Private residence  Current DME Used/Available at Home: Walker, rolling, Lizbeth Cast, straight, Wheelchair  Prior Level of Function/Work/Activity:  Independent with use of a RLE AFO without an assistive device. Number of Personal Factors/Comorbidities that affect the Plan of Care: 1-2: MODERATE COMPLEXITY   EXAMINATION:   Most Recent Physical Functioning:   Gross Assessment:  AROM: Generally decreased, functional  PROM: Generally decreased, functional  Strength: Generally decreased, functional               Posture:     Balance:  Sitting: Intact Bed Mobility:  Rolling: Other (comment)(unable to tolerate rolling)  Supine to Sit: Moderate assistance  Sit to Supine: Moderate assistance  Scooting: Minimum assistance; Moderate assistance  Wheelchair Mobility:     Transfers: Unable to assess secondary to pain     Gait: Unable to assess secondary to pain            Body Structures Involved:  1. Bones  2. Joints  3. Muscles Body Functions Affected:  1. Neuromusculoskeletal  2. Movement Related Activities and Participation Affected:  1. General Tasks and Demands  2. Mobility  3.  Self Care   Number of elements that affect the Plan of Care: 3: MODERATE COMPLEXITY   CLINICAL PRESENTATION:   Presentation: Evolving clinical presentation with changing clinical characteristics: MODERATE COMPLEXITY   CLINICAL DECISION MAKIN [facility] In[facility] Short Form  How much difficulty does the patient currently have... Unable A Lot A Little None   1. Turning over in bed (including adjusting bedclothes, sheets and blankets)? [x] 1   [] 2   [] 3   [] 4   2. Sitting down on and standing up from a chair with arms ( e.g., wheelchair, bedside commode, etc.)   [x] 1   [] 2   [] 3   [] 4   3. Moving from lying on back to sitting on the side of the bed? [] 1   [x] 2   [] 3   [] 4   How much help from another person does the patient currently need. .. Total A Lot A Little None   4. Moving to and from a bed to a chair (including a wheelchair)? [x] 1   [] 2   [] 3   [] 4   5. Need to walk in hospital room? [x] 1   [] 2   [] 3   [] 4   6. Climbing 3-5 steps with a railing? [x] 1   [] 2   [] 3   [] 4   © 2007, Trustees of [address] [place] [address] [address] [address], under license to [employer]. All rights reserved      Score:  Initial: 8 Most Recent: X (Date: -- )    Interpretation of Tool:  Represents activities that are increasingly more difficult (i.e. Bed mobility, Transfers, Gait). Medical Necessity:     · Patient is expected to demonstrate progress in   · strength and functional technique  ·  to   · increase independence with bed mobility, SPT and gait with LRAD   · . Reason for Services/Other Comments:  · Patient continues to require skilled intervention due to   · medical complications  · . Use of outcome tool(s) and clinical judgement create a POC that gives a: Questionable prediction of patient's progress: MODERATE COMPLEXITY            TREATMENT:   (In addition to Assessment/Re-Assessment sessions the following treatments were rendered)   Pre-treatment Symptoms/Complaints:  Patient reports pain at 8/10 after having received pain medication.     Pain: Initial:   Pain Intensity 1: 8  Pain Location 1: Back  Pain Orientation 1: Mid  Post Session:  8/10     Assessment/Reassessment only, no treatment provided today    Braces/Orthotics/Lines/Etc:   · IV  · O2 Device: Nasal cannula  Treatment/Session Assessment:    · Response to Treatment:  Patient participated but limited his activity secondary to increased back pain. He could only tolerated 1-2 minutes of sitting on the EOB. · Interdisciplinary Collaboration:   o Physical Therapist  o Registered Nurse  · After treatment position/precautions:   o Supine in bed  o Bed in low position  o Call light within reach  o RN notified  o Family at bedside   · Compliance with Program/Exercises: Will assess as treatment progresses  · Recommendations/Intent for next treatment session: \"Next visit will focus on advancements to more challenging activities and reduction in assistance provided\".   Total Treatment Duration:  PT Patient Time In/Time Out  Time In: 1043  Time Out: 2986 Amira Ly

## 2020-05-30 NOTE — PROGRESS NOTES
ORTH FRACTURE PROGRESS NOTE    May 30, 2020  Admit Date:   2020    Post Op day: 1 Day Post-Op    Subjective:    Lawerance Duet Shook PATIENT LYING IN BED; COMPLAINS OF BACK PAIN     PT/OT:   Gait:                    Vital Signs:    Patient Vitals for the past 8 hrs:   BP Temp Pulse Resp SpO2   20 0743 (P) 173/83 (P) 97.6 °F (36.4 °C) (P) 73 (P) 17 (P) 100 %   20 0336 138/76 99.1 °F (37.3 °C) 71 17 92 %     Temp (24hrs), Av.2 °F (36.8 °C), Min:97.3 °F (36.3 °C), Max:99.3 °F (37.4 °C)      Pain Control:   Pain Assessment  Pain Scale 1: Numeric (0 - 10)  Pain Intensity 1: 0  Pain Onset 1: post op   Pain Location 1: Back  Pain Orientation 1: Mid  Pain Description 1: Aching, Sore  Pain Intervention(s) 1: Medication (see MAR)    Meds:    Current Facility-Administered Medications   Medication Dose Route Frequency    aspirin chewable tablet 81 mg  81 mg Oral DAILY    doxazosin (CARDURA) tablet 2 mg  2 mg Oral DAILY    pantoprazole (PROTONIX) tablet 40 mg  40 mg Oral ACB    gabapentin (NEURONTIN) capsule 300 mg  300 mg Oral TID    levothyroxine (SYNTHROID) tablet 100 mcg  100 mcg Oral ACB    lisinopriL (PRINIVIL, ZESTRIL) tablet 5 mg  5 mg Oral DAILY    traMADoL (ULTRAM) tablet 50 mg  50 mg Oral Q6H PRN    alcohol 62% (NOZIN) nasal  1 Ampule  1 Ampule Topical Q12H    dextrose 5% - 0.45% NaCl with KCl 20 mEq/L infusion  75 mL/hr IntraVENous CONTINUOUS    sodium chloride (NS) flush 5-40 mL  5-40 mL IntraVENous Q8H    sodium chloride (NS) flush 5-40 mL  5-40 mL IntraVENous PRN    cyclobenzaprine (FLEXERIL) tablet 5 mg  5 mg Oral TID PRN    LORazepam (ATIVAN) tablet 1 mg  1 mg Oral BID PRN    HYDROcodone-acetaminophen (NORCO)  mg tablet 1 Tab  1 Tab Oral Q4H PRN    HYDROmorphone (PF) (DILAUDID) injection 1 mg  1 mg IntraVENous Q4H PRN    diphenhydrAMINE (BENADRYL) capsule 25 mg  25 mg Oral Q4H PRN    ondansetron (ZOFRAN ODT) tablet 4 mg  4 mg Oral Q4H PRN    magnesium hydroxide (MILK OF MAGNESIA) 400 mg/5 mL oral suspension 30 mL  30 mL Oral DAILY       LAB:    Recent Labs     05/27/20  1108   HCT 39.8*   HGB 13.3*       24 Hour Assessment Issues:    Oriented    Discharge Planning: HOME VS SNF    Transfuse PRBC's:      Assessment & Physician's Comment:  Dressing is clean, dry, and intact  Neurovascular checks within normal limits   HEMOVAC - FULL - NOT LISTED IN FLOWSHEET    Principal Problem:    Spinal stenosis, lumbar region, with neurogenic claudication (1/24/2012)    Active Problems:    Spinal stenosis (5/29/2020)        Plan:  [place] VS SNF AT [facility], NP

## 2020-05-31 PROCEDURE — 74011250637 HC RX REV CODE- 250/637: Performed by: ORTHOPAEDIC SURGERY

## 2020-05-31 PROCEDURE — 97530 THERAPEUTIC ACTIVITIES: CPT

## 2020-05-31 PROCEDURE — 65270000029 HC RM PRIVATE

## 2020-05-31 RX ADMIN — ONDANSETRON 4 MG: 4 TABLET, ORALLY DISINTEGRATING ORAL at 23:21

## 2020-05-31 RX ADMIN — TRAMADOL HYDROCHLORIDE 50 MG: 50 TABLET, FILM COATED ORAL at 16:45

## 2020-05-31 RX ADMIN — PANTOPRAZOLE SODIUM 40 MG: 40 TABLET, DELAYED RELEASE ORAL at 06:24

## 2020-05-31 RX ADMIN — Medication 1 AMPULE: at 08:42

## 2020-05-31 RX ADMIN — Medication 5 ML: at 23:12

## 2020-05-31 RX ADMIN — GABAPENTIN 300 MG: 300 CAPSULE ORAL at 08:42

## 2020-05-31 RX ADMIN — ONDANSETRON 4 MG: 4 TABLET, ORALLY DISINTEGRATING ORAL at 13:46

## 2020-05-31 RX ADMIN — GABAPENTIN 300 MG: 300 CAPSULE ORAL at 16:45

## 2020-05-31 RX ADMIN — HYDROCODONE BITARTRATE AND ACETAMINOPHEN 1 TABLET: 10; 325 TABLET ORAL at 06:23

## 2020-05-31 RX ADMIN — Medication 10 ML: at 13:43

## 2020-05-31 RX ADMIN — HYDROCODONE BITARTRATE AND ACETAMINOPHEN 1 TABLET: 10; 325 TABLET ORAL at 23:11

## 2020-05-31 RX ADMIN — MAGNESIUM HYDROXIDE 30 ML: 2400 SUSPENSION ORAL at 08:42

## 2020-05-31 RX ADMIN — TRAMADOL HYDROCHLORIDE 50 MG: 50 TABLET, FILM COATED ORAL at 09:16

## 2020-05-31 RX ADMIN — ONDANSETRON 4 MG: 4 TABLET, ORALLY DISINTEGRATING ORAL at 09:16

## 2020-05-31 RX ADMIN — GABAPENTIN 300 MG: 300 CAPSULE ORAL at 23:11

## 2020-05-31 RX ADMIN — DOXAZOSIN 2 MG: 1 TABLET ORAL at 08:42

## 2020-05-31 RX ADMIN — ASPIRIN 81 MG 81 MG: 81 TABLET ORAL at 08:42

## 2020-05-31 RX ADMIN — Medication 1 AMPULE: at 23:11

## 2020-05-31 RX ADMIN — HYDROCODONE BITARTRATE AND ACETAMINOPHEN 1 TABLET: 10; 325 TABLET ORAL at 13:46

## 2020-05-31 RX ADMIN — LISINOPRIL 5 MG: 5 TABLET ORAL at 08:42

## 2020-05-31 RX ADMIN — LEVOTHYROXINE SODIUM 100 MCG: 100 TABLET ORAL at 06:23

## 2020-05-31 NOTE — PROGRESS NOTES
Problem: Mobility Impaired (Adult and Pediatric)  Goal: *Acute Goals and Plan of Care (Insert Text)  Description: Goals:  (1.)Mr. Donovan will move from supine to sit and sit to supine , scoot up and down and roll side to side with MODIFIED INDEPENDENCE within 5 treatment day(s). (2.)Mr. Donovan will tolerate sitting up on the EOB for 5-8 minutes without support within 5 treatment day(s)  (3.)Mr. Donovan will participate in BLE AAROM strength exercises in supine and sitting 2 x 10 reps within 5 treatment day(s)  (4). Mr. Nella Wilkes will transfer sit to stand and SPT with CGA using the r/walker within 5 treatment day(s)  (5.) Mr. Nella Wilkes will ambulate with LRAD for ' with LRAD with CGA within 5 treatment day(s)      PHYSICAL THERAPY: Daily Note and AM 5/31/2020  INPATIENT:    [place] / Product Type: Medicare /       NAME/AGE/GENDER: Beto Coats is a 82 y.o. male   PRIMARY DIAGNOSIS: Lumbar stenosis with neurogenic claudication [M48.062]  Spinal stenosis [M48.00] Spinal stenosis, lumbar region, with neurogenic claudication Spinal stenosis, lumbar region, with neurogenic claudication  Procedure(s) (LRB):  T11-L1 LAMINECTOMY/ T9-L1 FUSION (N/A)  2 Days Post-Op  ICD-10: Treatment Diagnosis:    · Generalized Muscle Weakness (M62.81)  · Difficulty in walking, Not elsewhere classified (R26.2)  · Other abnormalities of gait and mobility (R26.89)  · Low Back Pain (M54.5)   Precaution/Allergies:  Lipitor [atorvastatin] and Plavix [clopidogrel]      ASSESSMENT:         Patient presents in supine and agreeable to work with therapy. He sat to EOB with mod a. Static sitting was good. Sit to stand required CGA with use of bed elevation. Static standing with the r/walker was steady. Patient was able to manage several steps to transfer to chair using the r/walker.  Once in chair and rested he performed therapeutic strengthening exercises to B LE as listed below to improve endurance, balance and functional strength for transfers, gait and overall mobility. Patient required cues to perform exercises correctly. Pt remained anxious and c/o pain . RN brought him pain medicine. Pt was left in chair about 10 min. Returned to room and assisted pt back to EOB in same manner he amb to chair. Sit to supine with mod a and VC's. He was positioned for comfort with his call light and personal items in reach. He continued to feel sick and was given ice, wet wash cloth and bag for use if needed. After several minutes his symptoms subsided and he stated he felt better. This section established at most recent assessment   PROBLEM LIST (Impairments causing functional limitations):  1. Decreased Strength  2. Decreased ADL/Functional Activities  3. Decreased Transfer Abilities  4. Decreased Ambulation Ability/Technique  5. Increased Pain  6. Decreased Activity Tolerance   INTERVENTIONS PLANNED: (Benefits and precautions of physical therapy have been discussed with the patient.)  1. Bed Mobility  2. Gait Training  3. Therapeutic Activites  4. Therapeutic Exercise/Strengthening  5. Transfer Training     TREATMENT PLAN: Frequency/Duration: twice daily for duration of hospital stay  Rehabilitation Potential For Stated Goals: Good     REHAB RECOMMENDATIONS (at time of discharge pending progress):    Placement: It is my opinion, based on this patient's performance to date, that Mr. Donovan may benefit from participating in 1-2 additional therapy sessions in order to continue to assess for rehab potential and then make recommendation for disposition at discharge. Equipment:    None at this time              HISTORY:   History of Present Injury/Illness (Reason for Referral): This is an 51-year-old who had multiple surgeries by Dr. Angel Puente. At this point he is fused L1 to S1, but he has instrumentation just at L1-L2-L3 and then a single screw at L5 on one side.   He had done well for several years but started having recurrent symptoms. Sounds like stenosis, and an MRI scan did show stenosis at T12, L1, and he would get better with epidural blocks, but it would wear off. It has gotten progressively worse, and he is very limited on his ability to stand and walk. We have been planning to do a fusion for a long time, extend this fusion up, but because of the COVID infection this had to be postponed. He has required narcotics monthly to manage this. We got him scheduled. We are going to decompress the T12/L1 level. We might even check up T11-T12, but we are going to add pedicle screws T10 down and connect the rods. Hopefully this will give him satisfactory relief. He tells me today that he has had some pain in the SI joints for a while, and he gets regular SI joint injections from Dr. Khai Obrien and wanted to know about the possibility of an SI joint fusion. I discussed it with him. In general though I think it might make him extremely stiff. I would be wary about it, but certainly I think he needs to get over this surgery and see how he feels and see if that is still an issue. Past Medical History/Comorbidities:   Mr. Mikael Mcdermott  has a past medical history of BPH (benign prostatic hyperplasia), CAD (coronary artery disease) (2016), Chronic pain, GERD (gastroesophageal reflux disease), Hypertension, Kidney stone, Spinal stenosis, and Thyroid disease. He also has no past medical history of Difficult intubation, Malignant hyperthermia due to anesthesia, Nausea & vomiting, or Pseudocholinesterase deficiency. Mr. Mikael Mcdermott  has a past surgical history that includes hx appendectomy (1957); hx heart catheterization (,2016, 2017); neurological procedure unlisted (2001); hx lumbar fusion (2002); hx lumbar fusion (2006); hx lumbar fusion; hx cataract removal (Bilateral); and hx colonoscopy.   Social History/Living Environment:   Home Environment: Private residence  # Steps to Enter: 1  Wheelchair Ramp: No  One/Two Story Residence: One story  Living Alone: No  Support Systems: Spouse/Significant Other/Partner  Patient Expects to be Discharged to[de-identified] Private residence  Current DME Used/Available at Home: aurelia Wheatley, [address] [facility], [place], straight, Wheelchair  Prior Level of Function/Work/Activity:  Independent with use of a RLE AFO without an assistive device. Number of Personal Factors/Comorbidities that affect the Plan of Care: 1-2: MODERATE COMPLEXITY   EXAMINATION:   Most Recent Physical Functioning:   Gross Assessment:                  Posture:     Balance:  Sitting: Intact  Standing: Impaired  Standing - Static: Fair  Standing - Dynamic : Fair Bed Mobility:  Supine to Sit: Moderate assistance  Sit to Supine: Moderate assistance  Wheelchair Mobility:     Transfers: Unable to assess secondary to pain  Sit to Stand: Contact guard assistance  Stand to Sit: Contact guard assistance  Gait: Unable to assess secondary to pain     Base of Support: Narrowed  Speed/Gilda: Slow  Step Length: Left shortened;Right shortened  Distance (ft): 5 Feet (ft)  Assistive Device: Walker, rolling  Ambulation - Level of Assistance: Contact guard assistance  Interventions: Safety awareness training;Verbal cues      Body Structures Involved:  1. Bones  2. Joints  3. Muscles Body Functions Affected:  1. Neuromusculoskeletal  2. Movement Related Activities and Participation Affected:  1. General Tasks and Demands  2. Mobility  3. Self Care   Number of elements that affect the Plan of Care: 3: MODERATE COMPLEXITY   CLINICAL PRESENTATION:   Presentation: Evolving clinical presentation with changing clinical characteristics: MODERATE COMPLEXITY   CLINICAL DECISION MAKIN [facility] Inpatient Short Form  How much difficulty does the patient currently have. .. Unable A Lot A Little None   1. Turning over in bed (including adjusting bedclothes, sheets and blankets)? [x] 1   [] 2   [] 3   [] 4   2.   Sitting down on and standing up from a chair with arms ( e.g., wheelchair, bedside commode, etc.)   [x] 1   [] 2   [] 3   [] 4   3. Moving from lying on back to sitting on the side of the bed? [] 1   [x] 2   [] 3   [] 4   How much help from another person does the patient currently need. .. Total A Lot A Little None   4. Moving to and from a bed to a chair (including a wheelchair)? [x] 1   [] 2   [] 3   [] 4   5. Need to walk in hospital room? [x] 1   [] 2   [] 3   [] 4   6. Climbing 3-5 steps with a railing? [x] 1   [] 2   [] 3   [] 4   © 2007, Trustees of [address] [place] [address] [address] [address], under license to [employer]. All rights reserved      Score:  Initial: 8 Most Recent: X (Date: -- )    Interpretation of Tool:  Represents activities that are increasingly more difficult (i.e. Bed mobility, Transfers, Gait). Medical Necessity:     · Patient is expected to demonstrate progress in   · strength and functional technique  ·  to   · increase independence with bed mobility, SPT and gait with LRAD   · . Reason for Services/Other Comments:  · Patient continues to require skilled intervention due to   · medical complications  · . Use of outcome tool(s) and clinical judgement create a POC that gives a: Questionable prediction of patient's progress: MODERATE COMPLEXITY            TREATMENT:   (In addition to Assessment/Re-Assessment sessions the following treatments were rendered)   Pre-treatment Symptoms/Complaints:  Patient reports pain at 6/10 pre therapy and asked for pain medication after therapy. Pain: Initial: 4     Post Session:         Therapeutic Activity: (    30 + 9 min): Therapeutic activities including bed mobility, amb on level surface, chair transfers and LE ex to improve mobility, strength and balance. Required min Safety awareness training;Verbal cues to promote dynamic balance in standing.          Braces/Orthotics/Lines/Etc:   · IV  · O2 Device: Nasal cannula  Treatment/Session Assessment:    · Response to Treatment: Anxious    · Interdisciplinary Collaboration:   o Physical Therapy Assistant  o Registered Nurse  · After treatment position/precautions:   o Supine in bed  o Bed in low position  o Call light within reach  o RN notified   · Compliance with Program/Exercises: Will assess as treatment progresses  · Recommendations/Intent for next treatment session: \"Next visit will focus on advancements to more challenging activities and reduction in assistance provided\".   Total Treatment Duration:  PT Patient Time In/Time Out  Time In: 9200(378)  Time Out: 5830(141)    Patti Peres, PTA

## 2020-05-31 NOTE — PROGRESS NOTES
Reviewed notes for spiritual concerns prior to visit  Visit with patient to build rapport with . Calm  Encouraged with presence and words of hope    Patient demonstrates confidence in the care team.  Appears to be coping with illness.     No physical contact with patient per guidelines  Assurance of ongoing prayers      Janae Hernandez,  Staff   C: [phone]  /  [email or web address]

## 2020-05-31 NOTE — PROGRESS NOTES
LM for pt to call the office.    May 31, 2020         Post Op day: 2 Days Post-Op     Admit Date: 2020  Admit Diagnosis: Lumbar stenosis with neurogenic claudication [M48.062]  Spinal stenosis [M48.00]        Subjective: Patient is status-post Procedure(s) (LRB):  T11-L1 LAMINECTOMY/ T9-L1 FUSION (N/A). Patient doing well. No concerns/complaints. No shortness of breath, chest pain or nausea/vomiting. Objective:   Visit Vitals  /86 (BP 1 Location: Right arm, BP Patient Position: At rest)   Pulse (!) 101   Temp 98.6 °F (37 °C)   Resp 19   Ht 6' (1.829 m)   Wt 84.4 kg (186 lb 2 oz)   SpO2 93%   BMI 25.24 kg/m²    Temp (24hrs), Av.4 °F (36.9 °C), Min:97.9 °F (36.6 °C), Max:99.3 °F (37.4 °C)    Lab Results   Component Value Date/Time    HGB 13.3 (L) 2020 11:08 AM     Extremity Exam  Dressing Clean, dry, intact.   Neuro intact and unchanged bilaterl lower extremity  Sensation intact to light touch  Extremity perfused  No sign of DVT     Assessment / Plan :  S/p Procedure(s) (LRB):  T11-L1 LAMINECTOMY/ T9-L1 FUSION (N/A)  Continue current postoperative plan  PT/OT-Continue current weightbearing status and restrictions of involved extremities  Continue current VTE prophylaxis  Patient Active Problem List   Diagnosis Code    GERD (gastroesophageal reflux disease) K21.9    Thyroid disease E07.9    Chronic pain G89.29    Spinal stenosis, lumbar region, with neurogenic claudication M48.062    Elevated blood pressure [id number]    Spinal stenosis M48.00          Signed By: Una Escobar MD

## 2020-05-31 NOTE — PROGRESS NOTES
Problem: Mobility Impaired (Adult and Pediatric)  Goal: *Acute Goals and Plan of Care (Insert Text)  Description: Goals:  (1.)Mr. Donovan will move from supine to sit and sit to supine , scoot up and down and roll side to side with MODIFIED INDEPENDENCE within 5 treatment day(s). (2.)Mr. Donovan will tolerate sitting up on the EOB for 5-8 minutes without support within 5 treatment day(s)  (3.)Mr. Donovan will participate in BLE AAROM strength exercises in supine and sitting 2 x 10 reps within 5 treatment day(s)  (4). Mr. Robinson Deleon will transfer sit to stand and SPT with CGA using the r/walker within 5 treatment day(s)  (5.) Mr. Robinson Deleon will ambulate with LRAD for ' with LRAD with CGA within 5 treatment day(s)      PHYSICAL THERAPY: Daily Note and PM 5/31/2020  INPATIENT:    [place] / Plan[place] / Product Type: Medicare /       NAME/AGE/GENDER: Adrian Isabel is a 82 y.o. male   PRIMARY DIAGNOSIS: Lumbar stenosis with neurogenic claudication [M48.062]  Spinal stenosis [M48.00] Spinal stenosis, lumbar region, with neurogenic claudication Spinal stenosis, lumbar region, with neurogenic claudication  Procedure(s) (LRB):  T11-L1 LAMINECTOMY/ T9-L1 FUSION (N/A)  2 Days Post-Op  ICD-10: Treatment Diagnosis:    · Generalized Muscle Weakness (M62.81)  · Difficulty in walking, Not elsewhere classified (R26.2)  · Other abnormalities of gait and mobility (R26.89)  · Low Back Pain (M54.5)   Precaution/Allergies:  Lipitor [atorvastatin] and Plavix [clopidogrel]      ASSESSMENT:         Patient presents in supine and agreeable to work with therapy. He sat to EOB with min a to his right. Static sitting was good. Sit to stand required CGA to RW. He was able to amb 12' with RW and CGA around the bed and to chair and sat.   Once in chair and rested he performed therapeutic strengthening exercises to B LE as listed below to improve endurance, balance and functional strength for transfers, gait and overall mobility. Patient required cues to perform exercises correctly. Less anxious this PM. He was positioned for comfort with his call light and personal items in reach. His wife present. Progressed this PM shown by amb farther distance and less assistance needed for transfers and amb. Continue to work toward goals. This section established at most recent assessment   PROBLEM LIST (Impairments causing functional limitations):  1. Decreased Strength  2. Decreased ADL/Functional Activities  3. Decreased Transfer Abilities  4. Decreased Ambulation Ability/Technique  5. Increased Pain  6. Decreased Activity Tolerance   INTERVENTIONS PLANNED: (Benefits and precautions of physical therapy have been discussed with the patient.)  1. Bed Mobility  2. Gait Training  3. Therapeutic Activites  4. Therapeutic Exercise/Strengthening  5. Transfer Training     TREATMENT PLAN: Frequency/Duration: twice daily for duration of hospital stay  Rehabilitation Potential For Stated Goals: Good     REHAB RECOMMENDATIONS (at time of discharge pending progress):    Placement: It is my opinion, based on this patient's performance to date, that Mr. Donovan may benefit from participating in 1-2 additional therapy sessions in order to continue to assess for rehab potential and then make recommendation for disposition at discharge. Equipment:    None at this time              HISTORY:   History of Present Injury/Illness (Reason for Referral): This is an 53-year-old who had multiple surgeries by Dr. Josephine Lovelace. At this point he is fused L1 to S1, but he has instrumentation just at L1-L2-L3 and then a single screw at L5 on one side. He had done well for several years but started having recurrent symptoms. Sounds like stenosis, and an MRI scan did show stenosis at T12, L1, and he would get better with epidural blocks, but it would wear off. It has gotten progressively worse, and he is very limited on his ability to stand and walk.   We have been planning to do a fusion for a long time, extend this fusion up, but because of the COVID infection this had to be postponed. He has required narcotics monthly to manage this. We got him scheduled. We are going to decompress the T12/L1 level. We might even check up T11-T12, but we are going to add pedicle screws T10 down and connect the rods. Hopefully this will give him satisfactory relief. He tells me today that he has had some pain in the SI joints for a while, and he gets regular SI joint injections from Dr. Radha Turcios and wanted to know about the possibility of an SI joint fusion. I discussed it with him. In general though I think it might make him extremely stiff. I would be wary about it, but certainly I think he needs to get over this surgery and see how he feels and see if that is still an issue. Past Medical History/Comorbidities:   Mr. Anthony Bamberger  has a past medical history of BPH (benign prostatic hyperplasia), CAD (coronary artery disease) (2016), Chronic pain, GERD (gastroesophageal reflux disease), Hypertension, Kidney stone, Spinal stenosis, and Thyroid disease. He also has no past medical history of Difficult intubation, Malignant hyperthermia due to anesthesia, Nausea & vomiting, or Pseudocholinesterase deficiency. Mr. Anthony Bamberger  has a past surgical history that includes hx appendectomy (1957); hx heart catheterization (,2016, 2017); neurological procedure unlisted (2001); hx lumbar fusion (2002); hx lumbar fusion (2006); hx lumbar fusion; hx cataract removal (Bilateral); and hx colonoscopy.   Social History/Living Environment:   Home Environment: Private residence  # Steps to Enter: 1  Wheelchair Ramp: No  One/Two Story Residence: One story  Living Alone: No  Support Systems: Spouse/Significant Other/Partner  Patient Expects to be Discharged to[de-identified] Private residence  Current DME Used/Available at Home: Andrea Barrientos, Izzy moses, luis eduardo, Wheelchair  Prior Level of Function/Work/Activity:  Independent with use of a RLE AFO without an assistive device. Number of Personal Factors/Comorbidities that affect the Plan of Care: 1-2: MODERATE COMPLEXITY   EXAMINATION:   Most Recent Physical Functioning:   Gross Assessment:                  Posture:     Balance:  Sitting: Intact  Standing: Impaired  Standing - Static: Fair(+)  Standing - Dynamic : Fair Bed Mobility:  Supine to Sit: Minimum assistance  Sit to Supine: Moderate assistance  Wheelchair Mobility:     Transfers: Unable to assess secondary to pain  Sit to Stand: Contact guard assistance  Stand to Sit: Contact guard assistance  Gait: Unable to assess secondary to pain     Base of Support: Narrowed  Speed/Gilda: Slow  Step Length: Left shortened;Right shortened  Distance (ft): 12 Feet (ft)  Assistive Device: Walker, rolling  Ambulation - Level of Assistance: Contact guard assistance  Interventions: Safety awareness training;Verbal cues      Body Structures Involved:  1. Bones  2. Joints  3. Muscles Body Functions Affected:  1. Neuromusculoskeletal  2. Movement Related Activities and Participation Affected:  1. General Tasks and Demands  2. Mobility  3. Self Care   Number of elements that affect the Plan of Care: 3: MODERATE COMPLEXITY   CLINICAL PRESENTATION:   Presentation: Evolving clinical presentation with changing clinical characteristics: MODERATE COMPLEXITY   CLINICAL DECISION MAKIN [facility] Mobility Inpatient Short Form  How much difficulty does the patient currently have. .. Unable A Lot A Little None   1. Turning over in bed (including adjusting bedclothes, sheets and blankets)? [x] 1   [] 2   [] 3   [] 4   2. Sitting down on and standing up from a chair with arms ( e.g., wheelchair, bedside commode, etc.)   [x] 1   [] 2   [] 3   [] 4   3. Moving from lying on back to sitting on the side of the bed?    [] 1   [x] 2   [] 3   [] 4   How much help from another person does the patient currently need. .. Total A Lot A Little None   4. Moving to and from a bed to a chair (including a wheelchair)? [x] 1   [] 2   [] 3   [] 4   5. Need to walk in hospital room? [x] 1   [] 2   [] 3   [] 4   6. Climbing 3-5 steps with a railing? [x] 1   [] 2   [] 3   [] 4   © 2007, Trustees of [address] [place] [address] [address] [id number], under license to [employer]. All rights reserved      Score:  Initial: 8 Most Recent: X (Date: -- )    Interpretation of Tool:  Represents activities that are increasingly more difficult (i.e. Bed mobility, Transfers, Gait). Medical Necessity:     · Patient is expected to demonstrate progress in   · strength and functional technique  ·  to   · increase independence with bed mobility, SPT and gait with LRAD   · . Reason for Services/Other Comments:  · Patient continues to require skilled intervention due to   · medical complications  · . Use of outcome tool(s) and clinical judgement create a POC that gives a: Questionable prediction of patient's progress: MODERATE COMPLEXITY            TREATMENT:   (In addition to Assessment/Re-Assessment sessions the following treatments were rendered)   Pre-treatment Symptoms/Complaints:  \"I'm still hurting\". Did not rate on pain scale. Pain: Initial:      Post Session:         Therapeutic Activity: (    23 min): Therapeutic activities including bed mobility, amb on level surface, chair transfers and LE ex to improve mobility, strength and balance. Required min Safety awareness training;Verbal cues to promote dynamic balance in standing. Braces/Orthotics/Lines/Etc:   · IV  · O2 Device: Nasal cannula  Treatment/Session Assessment:    · Response to Treatment: Anxious    · Interdisciplinary Collaboration:   o Physical Therapy Assistant  o Registered Nurse  · After treatment position/precautions:   o Up in chair  o Bed in low position  o Call light within reach  o RN notified  o Family at bedside   · Compliance with Program/Exercises:  Will assess as treatment progresses  · Recommendations/Intent for next treatment session: \"Next visit will focus on advancements to more challenging activities and reduction in assistance provided\".   Total Treatment Duration:  PT Patient Time In/Time Out  Time In: 1356  Time Out: [address], [place]N

## 2020-06-01 ENCOUNTER — APPOINTMENT (OUTPATIENT)
Dept: GENERAL RADIOLOGY | Age: 85
DRG: 460 | End: 2020-06-01
Attending: PHYSICIAN ASSISTANT
Payer: MEDICARE

## 2020-06-01 PROCEDURE — 97530 THERAPEUTIC ACTIVITIES: CPT

## 2020-06-01 PROCEDURE — 74011250636 HC RX REV CODE- 250/636: Performed by: PHYSICIAN ASSISTANT

## 2020-06-01 PROCEDURE — 74011250637 HC RX REV CODE- 250/637: Performed by: PHYSICIAN ASSISTANT

## 2020-06-01 PROCEDURE — 86580 TB INTRADERMAL TEST: CPT | Performed by: PHYSICIAN ASSISTANT

## 2020-06-01 PROCEDURE — 74022 RADEX COMPL AQT ABD SERIES: CPT

## 2020-06-01 PROCEDURE — 74011000302 HC RX REV CODE- 302: Performed by: PHYSICIAN ASSISTANT

## 2020-06-01 PROCEDURE — 77030012893

## 2020-06-01 PROCEDURE — 74011250637 HC RX REV CODE- 250/637: Performed by: ORTHOPAEDIC SURGERY

## 2020-06-01 PROCEDURE — 65270000029 HC RM PRIVATE

## 2020-06-01 RX ORDER — DOCUSATE SODIUM 100 MG/1
100 CAPSULE, LIQUID FILLED ORAL DAILY
Status: DISCONTINUED | OUTPATIENT
Start: 2020-06-01 | End: 2020-06-03 | Stop reason: HOSPADM

## 2020-06-01 RX ORDER — GABAPENTIN 300 MG/1
300 CAPSULE ORAL 3 TIMES DAILY
COMMUNITY

## 2020-06-01 RX ORDER — DEXTROSE, SODIUM CHLORIDE, AND POTASSIUM CHLORIDE 5; .45; .15 G/100ML; G/100ML; G/100ML
75 INJECTION INTRAVENOUS CONTINUOUS
Status: DISCONTINUED | OUTPATIENT
Start: 2020-06-01 | End: 2020-06-03 | Stop reason: HOSPADM

## 2020-06-01 RX ORDER — POLYETHYLENE GLYCOL 3350 17 G/17G
17 POWDER, FOR SOLUTION ORAL DAILY
Status: DISCONTINUED | OUTPATIENT
Start: 2020-06-02 | End: 2020-06-03 | Stop reason: HOSPADM

## 2020-06-01 RX ORDER — HYDROGEN PEROXIDE 3 %
20 SOLUTION, NON-ORAL MISCELLANEOUS DAILY
COMMUNITY

## 2020-06-01 RX ORDER — METOCLOPRAMIDE HYDROCHLORIDE 5 MG/ML
5 INJECTION INTRAMUSCULAR; INTRAVENOUS EVERY 6 HOURS
Status: COMPLETED | OUTPATIENT
Start: 2020-06-01 | End: 2020-06-02

## 2020-06-01 RX ADMIN — TUBERCULIN PURIFIED PROTEIN DERIVATIVE 5 UNITS: 5 INJECTION, SOLUTION INTRADERMAL at 17:19

## 2020-06-01 RX ADMIN — HYDROCODONE BITARTRATE AND ACETAMINOPHEN 1 TABLET: 10; 325 TABLET ORAL at 05:47

## 2020-06-01 RX ADMIN — Medication 1 AMPULE: at 09:05

## 2020-06-01 RX ADMIN — Medication 5 ML: at 12:34

## 2020-06-01 RX ADMIN — GABAPENTIN 300 MG: 300 CAPSULE ORAL at 09:02

## 2020-06-01 RX ADMIN — Medication 1 AMPULE: at 21:25

## 2020-06-01 RX ADMIN — Medication 5 ML: at 21:25

## 2020-06-01 RX ADMIN — ASPIRIN 81 MG 81 MG: 81 TABLET ORAL at 09:04

## 2020-06-01 RX ADMIN — DOCUSATE SODIUM 100 MG: 100 CAPSULE, LIQUID FILLED ORAL at 17:22

## 2020-06-01 RX ADMIN — Medication 5 ML: at 05:47

## 2020-06-01 RX ADMIN — PANTOPRAZOLE SODIUM 40 MG: 40 TABLET, DELAYED RELEASE ORAL at 05:47

## 2020-06-01 RX ADMIN — LISINOPRIL 5 MG: 5 TABLET ORAL at 08:58

## 2020-06-01 RX ADMIN — DOXAZOSIN 2 MG: 1 TABLET ORAL at 12:33

## 2020-06-01 RX ADMIN — METOCLOPRAMIDE 5 MG: 5 INJECTION, SOLUTION INTRAMUSCULAR; INTRAVENOUS at 17:22

## 2020-06-01 RX ADMIN — MAGNESIUM HYDROXIDE 30 ML: 2400 SUSPENSION ORAL at 08:58

## 2020-06-01 RX ADMIN — LEVOTHYROXINE SODIUM 100 MCG: 100 TABLET ORAL at 05:47

## 2020-06-01 RX ADMIN — DEXTROSE MONOHYDRATE, SODIUM CHLORIDE, AND POTASSIUM CHLORIDE 75 ML/HR: 50; 4.5; 1.49 INJECTION, SOLUTION INTRAVENOUS at 17:22

## 2020-06-01 RX ADMIN — CYCLOBENZAPRINE 5 MG: 10 TABLET, FILM COATED ORAL at 21:25

## 2020-06-01 NOTE — PROGRESS NOTES
ORTH POST OP PROGRESS NOTE    2020  Admit Date: 2020  Admit Diagnosis: Lumbar stenosis with neurogenic claudication [M48.062]  Spinal stenosis [M48.00]  Procedure: Procedure(s):  T11-L1 LAMINECTOMY/ T9-L1 FUSION  Post Op day: 3 Days Post-Op      Subjective:     Vaughn Alaniz is a patient who has complaints distended stomach. no flatus. Only ambulated 12 ft with PT.       Objective:     Vital Signs:    Blood pressure 138/81, pulse 100, temperature 98.2 °F (36.8 °C), resp. rate 18, height 6' (1.829 m), weight 84.4 kg (186 lb 2 oz), SpO2 93 %. Temp (24hrs), Av.1 °F (36.7 °C), Min:97.8 °F (36.6 °C), Max:98.4 °F (36.9 °C)      701 -  190  In: -   Out: 235 [Urine:200; Drains:35]  1901 -  07  In: -   Out: 550 [Drains:550]    LAB:    No results for input(s): HGB, WBC, PLT, HGBEXT, PLTEXT in the last 72 hours. Physical Exam    General:   Alert and oriented. No acute distress  Lungs:  Respirations unlabored. Extremities: No evidence of cyanosis. Calves soft, nontender. Moves both upper and lower extremities.    Dressing:  clean, dry, and intact  Neuro:  unchanged      Assessment:      Patient Active Problem List   Diagnosis Code    GERD (gastroesophageal reflux disease) K21.9    Thyroid disease E07.9    Chronic pain G89.29    Spinal stenosis, lumbar region, with neurogenic claudication M48.062    Elevated blood pressure [id number]    Spinal stenosis M48.00       Plan:     Continue PT/OT - aggressive - he has to sit up and mobilize   Discontinue: drain    Consult:    Distended abdomen: KUB, hold diet, reglan, mobilize    Anticipate Discharge To: HOME vs snf when medically stable       Signed By: Otis Lyman PA-C

## 2020-06-01 NOTE — PROGRESS NOTES
ORTHO PROGRESS NOTE    2020    Admit Date: 2020  Admit Diagnosis: Lumbar stenosis with neurogenic claudication [M48.062]  Spinal stenosis [M48.00]  Post Op day: 3 Days Post-Op      Subjective:     Isabel Donovan is a patient who is now 3 Days Post-Op  and has complaints of abdominal distension. Objective:     PT/OT:    Progressing    Vital Signs:    Patient Vitals for the past 8 hrs:   BP Temp Pulse Resp SpO2   20 1205 138/81 98.2 °F (36.8 °C) 100 18 93 %   20 0804 101/56 98.4 °F (36.9 °C) (!) 108 17 91 %     Temp (24hrs), Av.1 °F (36.7 °C), Min:97.8 °F (36.6 °C), Max:98.4 °F (36.9 °C)      LAB:    No results for input(s): HGB, WBC, PLT, HGBEXT, PLTEXT in the last 72 hours. I/O:  701 -  190  In: -   Out: 235 [Urine:200; Drains:35]  1901 -  0700  In: -   Out: 550 [Drains:550]    Physical Exam:    Awake and in no acute distress. Mood and affect appropriate. Respirations unlabored and no evidence cyanosis. Calves nontender. Abdomen soft and nontender. Dressing clean/dry  No new neurologic deficit.     Assessment:      Patient Active Problem List   Diagnosis Code    GERD (gastroesophageal reflux disease) K21.9    Thyroid disease E07.9    Chronic pain G89.29    Spinal stenosis, lumbar region, with neurogenic claudication M48.062    Elevated blood pressure [id number]    Spinal stenosis M48.00       3 Days Post-Op STATUS POST Procedure(s):  T11-L1 LAMINECTOMY/ T9-L1 FUSION      Plan:     Continue PT/OT  Agressive mobilize to promote bowels  Minimize PO intake      Signed By: Sj Mondragon MD

## 2020-06-01 NOTE — PROGRESS NOTES
Problem: Mobility Impaired (Adult and Pediatric)  Goal: *Acute Goals and Plan of Care (Insert Text)  Description: Goals:  (1.)Mr. Donovan will move from supine to sit and sit to supine , scoot up and down and roll side to side with MODIFIED INDEPENDENCE within 5 treatment day(s). (2.)Mr. Donovan will tolerate sitting up on the EOB for 5-8 minutes without support within 5 treatment day(s)  (3.)Mr. Donovan will participate in BLE AAROM strength exercises in supine and sitting 2 x 10 reps within 5 treatment day(s)  (4). Mr. Abdirizak Chowdhury will transfer sit to stand and SPT with CGA using the r/walker within 5 treatment day(s)  (5.) Mr. Abdirizak Chowdhury will ambulate with LRAD for ' with LRAD with CGA within 5 treatment day(s)      PHYSICAL THERAPY: Daily Note and AM 6/1/2020  INPATIENT: PT Visit Days : 3  [place] / [place] / Product Type: Medicare /       NAME/AGE/GENDER: Mildred Deluna is a 82 y.o. male   PRIMARY DIAGNOSIS: Lumbar stenosis with neurogenic claudication [M48.062]  Spinal stenosis [M48.00] Spinal stenosis, lumbar region, with neurogenic claudication Spinal stenosis, lumbar region, with neurogenic claudication  Procedure(s) (LRB):  T11-L1 LAMINECTOMY/ T9-L1 FUSION (N/A)  3 Days Post-Op  ICD-10: Treatment Diagnosis:    · Generalized Muscle Weakness (M62.81)  · Difficulty in walking, Not elsewhere classified (R26.2)  · Other abnormalities of gait and mobility (R26.89)  · Low Back Pain (M54.5)   Precaution/Allergies:  Lipitor [atorvastatin] and Plavix [clopidogrel]      ASSESSMENT:         Patient was supine upon contact and agreeable to PT with encouragement. Patient performs supine to sit and transfer to standing with min assist, additional time, and cues for improved/proper technique. Once standing patient ambulates 8' to bathroom with rolling walker, CGA-min assist and cues for sequencing with rolling walker as well as navigating rolling walker into tight bathroom space.  Patient tends to push rolling walker too far out in front of him needing increased cues for proper technique. Patient needs cues for safe stand to sit technique onto toilet. Patient demonstrates fair static/dynamic standing balance during ADL activity. Patient then ambulates 61' with rolling walker, unsteady gait noted needing CGA-min assist and cues for posture/sequencing with rolling walker as well as proximity to rolling walker. Patient returns to his room where he impulsively lays down despite being at the foot of the bed and gait belt on despite cues to scoot up in bed and allow therapist to doff gait belt. Patient sits back up with min assist where he was able to scoot up towards [facility] with CGA. Overall slow progress towards physical therapy goals. Patient's goals listed above are still appropriate. Will continue skilled PT to address remaining deficits. This section established at most recent assessment   PROBLEM LIST (Impairments causing functional limitations):  1. Decreased Strength  2. Decreased ADL/Functional Activities  3. Decreased Transfer Abilities  4. Decreased Ambulation Ability/Technique  5. Increased Pain  6. Decreased Activity Tolerance   INTERVENTIONS PLANNED: (Benefits and precautions of physical therapy have been discussed with the patient.)  1. Bed Mobility  2. Gait Training  3. Therapeutic Activites  4. Therapeutic Exercise/Strengthening  5. Transfer Training     TREATMENT PLAN: Frequency/Duration: twice daily for duration of hospital stay  Rehabilitation Potential For Stated Goals: Good     REHAB RECOMMENDATIONS (at time of discharge pending progress):    Placement: It is my opinion, based on this patient's performance to date, that Mr. Donovan may benefit from intensive therapy at a [address] [place] [address] after discharge due to the functional deficits listed above that are likely to improve with skilled rehabilitation and concerns that he/she may be unsafe to be unsupervised at home due to being below baseline and at an increased risk for falls. Equipment:    None at this time              HISTORY:   History of Present Injury/Illness (Reason for Referral): This is an 82-year-old who had multiple surgeries by Dr. Apolinar Laboy. At this point he is fused L1 to S1, but he has instrumentation just at L1-L2-L3 and then a single screw at L5 on one side. He had done well for several years but started having recurrent symptoms. Sounds like stenosis, and an MRI scan did show stenosis at T12, L1, and he would get better with epidural blocks, but it would wear off. It has gotten progressively worse, and he is very limited on his ability to stand and walk. We have been planning to do a fusion for a long time, extend this fusion up, but because of the COVID infection this had to be postponed. He has required narcotics monthly to manage this. We got him scheduled. We are going to decompress the T12/L1 level. We might even check up T11-T12, but we are going to add pedicle screws T10 down and connect the rods. Hopefully this will give him satisfactory relief. He tells me today that he has had some pain in the SI joints for a while, and he gets regular SI joint injections from Dr. Lucille Cortez and wanted to know about the possibility of an SI joint fusion. I discussed it with him. In general though I think it might make him extremely stiff. I would be wary about it, but certainly I think he needs to get over this surgery and see how he feels and see if that is still an issue. Past Medical History/Comorbidities:   Mr. William Dejesus  has a past medical history of BPH (benign prostatic hyperplasia), CAD (coronary artery disease) (2016), Chronic pain, GERD (gastroesophageal reflux disease), Hypertension, Kidney stone, Spinal stenosis, and Thyroid disease. He also has no past medical history of Difficult intubation, Malignant hyperthermia due to anesthesia, Nausea & vomiting, or Pseudocholinesterase deficiency.   Mr. William Dejesus has a past surgical history that includes hx appendectomy (1957); hx heart catheterization (,2016, 2017); neurological procedure unlisted (2001); hx lumbar fusion (2002); hx lumbar fusion (2006); hx lumbar fusion; hx cataract removal (Bilateral); and hx colonoscopy. Social History/Living Environment:   Home Environment: Private residence  # Steps to Enter: 1  Wheelchair Ramp: No  One/Two Story Residence: One story  Living Alone: No  Support Systems: Spouse/Significant Other/Partner  Patient Expects to be Discharged to[de-identified] Private residence  Current DME Used/Available at Home: Walker, rolling, Napolean Dasen, straight, Wheelchair  Prior Level of Function/Work/Activity:  Independent with use of a RLE AFO without an assistive device. Number of Personal Factors/Comorbidities that affect the Plan of Care: 1-2: MODERATE COMPLEXITY   EXAMINATION:   Most Recent Physical Functioning:   Gross Assessment:                  Posture:     Balance:  Sitting: Intact  Standing: Impaired; With support  Standing - Static: Fair  Standing - Dynamic : Fair Bed Mobility:  Supine to Sit: Minimum assistance  Wheelchair Mobility:     Transfers: Unable to assess secondary to pain  Sit to Stand: Minimum assistance  Stand to Sit: Minimum assistance  Gait: Unable to assess secondary to pain     Base of Support: Narrowed  Speed/Gilda: Slow;Shuffled  Step Length: Left shortened;Right shortened  Distance (ft): 60 Feet (ft)  Assistive Device: Walker, rolling  Ambulation - Level of Assistance: Minimal assistance  Interventions: Safety awareness training; Tactile cues; Verbal cues      Body Structures Involved:  1. Bones  2. Joints  3. Muscles Body Functions Affected:  1. Neuromusculoskeletal  2. Movement Related Activities and Participation Affected:  1. General Tasks and Demands  2. Mobility  3.  Self Care   Number of elements that affect the Plan of Care: 3: MODERATE COMPLEXITY   CLINICAL PRESENTATION:   Presentation: Evolving clinical presentation with changing clinical characteristics: MODERATE COMPLEXITY   CLINICAL DECISION MAKIN[address] [place] [address] AM-PAC 6 Clicks   Basic Mobility Inpatient Short Form  How much difficulty does the patient currently have. .. Unable A Lot A Little None   1. Turning over in bed (including adjusting bedclothes, sheets and blankets)? [x] 1   [] 2   [] 3   [] 4   2. Sitting down on and standing up from a chair with arms ( e.g., wheelchair, bedside commode, etc.)   [x] 1   [] 2   [] 3   [] 4   3. Moving from lying on back to sitting on the side of the bed? [] 1   [x] 2   [] 3   [] 4   How much help from another person does the patient currently need. .. Total A Lot A Little None   4. Moving to and from a bed to a chair (including a wheelchair)? [x] 1   [] 2   [] 3   [] 4   5. Need to walk in hospital room? [x] 1   [] 2   [] 3   [] 4   6. Climbing 3-5 steps with a railing? [x] 1   [] 2   [] 3   [] 4   © , Trustees of [address] [place] [address], under license to [employer]. All rights reserved      Score:  Initial: 8 Most Recent: X (Date: -- )    Interpretation of Tool:  Represents activities that are increasingly more difficult (i.e. Bed mobility, Transfers, Gait). Medical Necessity:     · Patient is expected to demonstrate progress in   · strength and functional technique  ·  to   · increase independence with bed mobility, SPT and gait with LRAD   · . Reason for Services/Other Comments:  · Patient continues to require skilled intervention due to   · medical complications  · . Use of outcome tool(s) and clinical judgement create a POC that gives a: Questionable prediction of patient's progress: MODERATE COMPLEXITY            TREATMENT:   (In addition to Assessment/Re-Assessment sessions the following treatments were rendered)   Pre-treatment Symptoms/Complaints:  \"see above  Pain: Initial:   Pain Intensity 1: 0  Post Session:         Therapeutic Activity: (    23 minutes):   Therapeutic activities including bed mobility training, transfer training from various surface heights, ambulation on level ground, static/dynamic sitting/standing balance, scooting, posture training, instruction in sequencing with rolling walker, and patient education to improve mobility, strength and balance. Required minutes Safety awareness training; Tactile cues; Verbal cues to promote static and dynamic balance in standing and promote coordination of bilateral, lower extremity(s). Braces/Orthotics/Lines/Etc:   · None  Treatment/Session Assessment:    · Response to Treatment: Anxious    · Interdisciplinary Collaboration:   o Physical Therapy Assistant  o Registered Nurse  · After treatment position/precautions:   o Supine in bed  o Bed alarm/tab alert on  o Bed/Chair-wheels locked  o Bed in low position  o Call light within reach  o RN notified   · Compliance with Program/Exercises: Will assess as treatment progresses  · Recommendations/Intent for next treatment session: \"Next visit will focus on advancements to more challenging activities and reduction in assistance provided\".   Total Treatment Duration:  PT Patient Time In/Time Out  Time In: 0925  Time Out: [address] PTA

## 2020-06-01 NOTE — PROGRESS NOTES
Problem: Mobility Impaired (Adult and Pediatric)  Goal: *Acute Goals and Plan of Care (Insert Text)  Description: Goals:  (1.)Mr. Donovan will move from supine to sit and sit to supine , scoot up and down and roll side to side with MODIFIED INDEPENDENCE within 5 treatment day(s). (2.)Mr. Donovan will tolerate sitting up on the EOB for 5-8 minutes without support within 5 treatment day(s)  (3.)Mr. Donovan will participate in BLE AAROM strength exercises in supine and sitting 2 x 10 reps within 5 treatment day(s)  (4). Mr. Jamie De Los Santos will transfer sit to stand and SPT with CGA using the r/walker within 5 treatment day(s)  (5.) Mr. Jamie De Los Santos will ambulate with LRAD for ' with LRAD with CGA within 5 treatment day(s)      PHYSICAL THERAPY: Daily Note and PM 6/1/2020  INPATIENT: PT Visit Days : 3  [place] / Product Type: Medicare /       NAME/AGE/GENDER: Oliverio Raman is a 82 y.o. male   PRIMARY DIAGNOSIS: Lumbar stenosis with neurogenic claudication [M48.062]  Spinal stenosis [M48.00] Spinal stenosis, lumbar region, with neurogenic claudication Spinal stenosis, lumbar region, with neurogenic claudication  Procedure(s) (LRB):  T11-L1 LAMINECTOMY/ T9-L1 FUSION (N/A)  3 Days Post-Op  ICD-10: Treatment Diagnosis:    · Generalized Muscle Weakness (M62.81)  · Difficulty in walking, Not elsewhere classified (R26.2)  · Other abnormalities of gait and mobility (R26.89)  · Low Back Pain (M54.5)   Precaution/Allergies:  Lipitor [atorvastatin] and Plavix [clopidogrel]      ASSESSMENT:         Patient was sitting up in recliner chair upon contact and agreeable to PT. Patients wife dons right AFO and patient transfers to standing with min assist and cues for hand placement/technique. Once standing patient ambulates 79' with rolling walker, unsteady gait noted needing CGA-min assist and cues for posture/sequencing with rolling walker as well as proximity to rolling walker.  Patient experienced several minor LOB during gait where his feet got crossed up needing min assist from therapist to regain balance. Patient also tends to have a hard time managing rolling walker pushing it too far out in front of him. Patient returns to recliner chair where he participates in therapeutic strengthening exercises to improve functional strength for transfers, gait and overall mobility. Patient requires cues to perform exercises correctly. Patient requests to return to supine post treatment which he does so with SBA. Overall slow progress towards physical therapy goals. Patient's goals listed above are still appropriate. Will continue skilled PT to address remaining deficits. This section established at most recent assessment   PROBLEM LIST (Impairments causing functional limitations):  1. Decreased Strength  2. Decreased ADL/Functional Activities  3. Decreased Transfer Abilities  4. Decreased Ambulation Ability/Technique  5. Increased Pain  6. Decreased Activity Tolerance   INTERVENTIONS PLANNED: (Benefits and precautions of physical therapy have been discussed with the patient.)  1. Bed Mobility  2. Gait Training  3. Therapeutic Activites  4. Therapeutic Exercise/Strengthening  5. Transfer Training     TREATMENT PLAN: Frequency/Duration: twice daily for duration of hospital stay  Rehabilitation Potential For Stated Goals: Good     REHAB RECOMMENDATIONS (at time of discharge pending progress):    Placement: It is my opinion, based on this patient's performance to date, that Mr. Donovan may benefit from intensive therapy at a [address] [place] [address] after discharge due to the functional deficits listed above that are likely to improve with skilled rehabilitation and concerns that he/she may be unsafe to be unsupervised at home due to being below baseline and at an increased risk for falls.   Equipment:    None at this time              HISTORY:   History of Present Injury/Illness (Reason for Referral): This is an 71-year-old who had multiple surgeries by Dr. Satira Cushing. At this point he is fused L1 to S1, but he has instrumentation just at L1-L2-L3 and then a single screw at L5 on one side. He had done well for several years but started having recurrent symptoms. Sounds like stenosis, and an MRI scan did show stenosis at T12, L1, and he would get better with epidural blocks, but it would wear off. It has gotten progressively worse, and he is very limited on his ability to stand and walk. We have been planning to do a fusion for a long time, extend this fusion up, but because of the COVID infection this had to be postponed. He has required narcotics monthly to manage this. We got him scheduled. We are going to decompress the T12/L1 level. We might even check up T11-T12, but we are going to add pedicle screws T10 down and connect the rods. Hopefully this will give him satisfactory relief. He tells me today that he has had some pain in the SI joints for a while, and he gets regular SI joint injections from Dr. Radha Turcios and wanted to know about the possibility of an SI joint fusion. I discussed it with him. In general though I think it might make him extremely stiff. I would be wary about it, but certainly I think he needs to get over this surgery and see how he feels and see if that is still an issue. Past Medical History/Comorbidities:   Mr. Anthony Bamberger  has a past medical history of BPH (benign prostatic hyperplasia), CAD (coronary artery disease) (2016), Chronic pain, GERD (gastroesophageal reflux disease), Hypertension, Kidney stone, Spinal stenosis, and Thyroid disease. He also has no past medical history of Difficult intubation, Malignant hyperthermia due to anesthesia, Nausea & vomiting, or Pseudocholinesterase deficiency.   Mr. Anthony Bamberger  has a past surgical history that includes hx appendectomy (1957); hx heart catheterization (,2016, 2017); neurological procedure unlisted (2001); hx lumbar fusion (2002); hx lumbar fusion (2006); hx lumbar fusion; hx cataract removal (Bilateral); and hx colonoscopy. Social History/Living Environment:   Home Environment: Private residence  # Steps to Enter: 1  Wheelchair Ramp: No  One/Two Story Residence: One story  Living Alone: No  Support Systems: Spouse/Significant Other/Partner  Patient Expects to be Discharged to[de-identified] Private residence  Current DME Used/Available at Home: Walker, rolling, [address], straight, Wheelchair  Prior Level of Function/Work/Activity:  Independent with use of a RLE AFO without an assistive device. Number of Personal Factors/Comorbidities that affect the Plan of Care: 1-2: MODERATE COMPLEXITY   EXAMINATION:   Most Recent Physical Functioning:   Gross Assessment:                  Posture:     Balance:  Sitting: Intact  Standing: Impaired; With support  Standing - Static: Fair  Standing - Dynamic : Fair Bed Mobility:  Supine to Sit: Minimum assistance  Sit to Supine: Stand-by assistance  Wheelchair Mobility:     Transfers: Unable to assess secondary to pain  Sit to Stand: Minimum assistance  Stand to Sit: Minimum assistance  Gait: Unable to assess secondary to pain     Base of Support: Narrowed  Speed/Gilda: Slow;Shuffled  Step Length: Left shortened;Right shortened  Distance (ft): 70 Feet (ft)  Assistive Device: Walker, rolling  Ambulation - Level of Assistance: Minimal assistance  Interventions: Safety awareness training; Tactile cues; Verbal cues      Body Structures Involved:  1. Bones  2. Joints  3. Muscles Body Functions Affected:  1. Neuromusculoskeletal  2. Movement Related Activities and Participation Affected:  1. General Tasks and Demands  2. Mobility  3.  Self Care   Number of elements that affect the Plan of Care: 3: MODERATE COMPLEXITY   CLINICAL PRESENTATION:   Presentation: Evolving clinical presentation with changing clinical characteristics: MODERATE COMPLEXITY   CLINICAL DECISION MAKIN [employer] Mobility Inpatient Short Form  How much difficulty does the patient currently have. .. Unable A Lot A Little None   1. Turning over in bed (including adjusting bedclothes, sheets and blankets)? [x] 1   [] 2   [] 3   [] 4   2. Sitting down on and standing up from a chair with arms ( e.g., wheelchair, bedside commode, etc.)   [x] 1   [] 2   [] 3   [] 4   3. Moving from lying on back to sitting on the side of the bed? [] 1   [x] 2   [] 3   [] 4   How much help from another person does the patient currently need. .. Total A Lot A Little None   4. Moving to and from a bed to a chair (including a wheelchair)? [x] 1   [] 2   [] 3   [] 4   5. Need to walk in hospital room? [x] 1   [] 2   [] 3   [] 4   6. Climbing 3-5 steps with a railing? [x] 1   [] 2   [] 3   [] 4   © 2007, Trustees of [facility] [employer], under license to [employer]. All rights reserved      Score:  Initial: 8 Most Recent: X (Date: -- )    Interpretation of Tool:  Represents activities that are increasingly more difficult (i.e. Bed mobility, Transfers, Gait). Medical Necessity:     · Patient is expected to demonstrate progress in   · strength and functional technique  ·  to   · increase independence with bed mobility, SPT and gait with LRAD   · . Reason for Services/Other Comments:  · Patient continues to require skilled intervention due to   · medical complications  · . Use of outcome tool(s) and clinical judgement create a POC that gives a: Questionable prediction of patient's progress: MODERATE COMPLEXITY            TREATMENT:   (In addition to Assessment/Re-Assessment sessions the following treatments were rendered)   Pre-treatment Symptoms/Complaints:  \"see above  Pain: Initial:   Pain Intensity 1: 0  Post Session:         Therapeutic Activity: (    23 minutes):   Therapeutic activities including bed mobility training, transfer training from various surface heights, ambulation on level ground, static/dynamic sitting/standing balance, LE exercises, scooting, posture training, instruction in sequencing with rolling walker, and patient education to improve mobility, strength and balance. Required minutes Safety awareness training; Tactile cues; Verbal cues to promote static and dynamic balance in standing and promote coordination of bilateral, lower extremity(s). Braces/Orthotics/Lines/Etc:   · None  Treatment/Session Assessment:    · Response to Treatment: Anxious    · Interdisciplinary Collaboration:   o Physical Therapy Assistant  o Registered Nurse  · After treatment position/precautions:   o Supine in bed  o Bed alarm/tab alert on  o Bed/Chair-wheels locked  o Bed in low position  o Call light within reach  o RN notified  o Family at bedside   · Compliance with Program/Exercises: Will assess as treatment progresses  · Recommendations/Intent for next treatment session: \"Next visit will focus on advancements to more challenging activities and reduction in assistance provided\".   Total Treatment Duration:  PT Patient Time In/Time Out  Time In: 1300  Time Out: Delfino Yusuf, PTA

## 2020-06-02 ENCOUNTER — HOME HEALTH ADMISSION (OUTPATIENT)
Dept: HOME HEALTH SERVICES | Facility: HOME HEALTH | Age: 85
End: 2020-06-02
Payer: MEDICARE

## 2020-06-02 LAB
MM INDURATION POC: 0 MM (ref 0–5)
PPD POC: NEGATIVE NEGATIVE

## 2020-06-02 PROCEDURE — 74011250636 HC RX REV CODE- 250/636: Performed by: PHYSICIAN ASSISTANT

## 2020-06-02 PROCEDURE — 65270000029 HC RM PRIVATE

## 2020-06-02 PROCEDURE — 97530 THERAPEUTIC ACTIVITIES: CPT

## 2020-06-02 PROCEDURE — 74011250637 HC RX REV CODE- 250/637: Performed by: ORTHOPAEDIC SURGERY

## 2020-06-02 PROCEDURE — 74011250637 HC RX REV CODE- 250/637: Performed by: PHYSICIAN ASSISTANT

## 2020-06-02 RX ADMIN — DEXTROSE MONOHYDRATE, SODIUM CHLORIDE, AND POTASSIUM CHLORIDE 75 ML/HR: 50; 4.5; 1.49 INJECTION, SOLUTION INTRAVENOUS at 18:28

## 2020-06-02 RX ADMIN — Medication 1 AMPULE: at 08:28

## 2020-06-02 RX ADMIN — HYDROCODONE BITARTRATE AND ACETAMINOPHEN 1 TABLET: 10; 325 TABLET ORAL at 14:27

## 2020-06-02 RX ADMIN — LISINOPRIL 5 MG: 5 TABLET ORAL at 08:27

## 2020-06-02 RX ADMIN — HYDROCODONE BITARTRATE AND ACETAMINOPHEN 1 TABLET: 10; 325 TABLET ORAL at 21:49

## 2020-06-02 RX ADMIN — METOCLOPRAMIDE 5 MG: 5 INJECTION, SOLUTION INTRAMUSCULAR; INTRAVENOUS at 01:18

## 2020-06-02 RX ADMIN — HYDROCODONE BITARTRATE AND ACETAMINOPHEN 1 TABLET: 10; 325 TABLET ORAL at 04:00

## 2020-06-02 RX ADMIN — LEVOTHYROXINE SODIUM 100 MCG: 100 TABLET ORAL at 06:29

## 2020-06-02 RX ADMIN — Medication 10 ML: at 06:00

## 2020-06-02 RX ADMIN — PANTOPRAZOLE SODIUM 40 MG: 40 TABLET, DELAYED RELEASE ORAL at 07:30

## 2020-06-02 RX ADMIN — Medication 5 ML: at 01:18

## 2020-06-02 RX ADMIN — HYDROCODONE BITARTRATE AND ACETAMINOPHEN 1 TABLET: 10; 325 TABLET ORAL at 08:30

## 2020-06-02 RX ADMIN — DOCUSATE SODIUM 100 MG: 100 CAPSULE, LIQUID FILLED ORAL at 08:26

## 2020-06-02 RX ADMIN — DEXTROSE MONOHYDRATE, SODIUM CHLORIDE, AND POTASSIUM CHLORIDE 75 ML/HR: 50; 4.5; 1.49 INJECTION, SOLUTION INTRAVENOUS at 01:18

## 2020-06-02 RX ADMIN — METOCLOPRAMIDE 5 MG: 5 INJECTION, SOLUTION INTRAMUSCULAR; INTRAVENOUS at 06:00

## 2020-06-02 RX ADMIN — DOXAZOSIN 2 MG: 1 TABLET ORAL at 11:56

## 2020-06-02 RX ADMIN — ASPIRIN 81 MG 81 MG: 81 TABLET ORAL at 08:27

## 2020-06-02 RX ADMIN — Medication 1 AMPULE: at 21:49

## 2020-06-02 RX ADMIN — POLYETHYLENE GLYCOL 3350 17 G: 17 POWDER, FOR SOLUTION ORAL at 08:26

## 2020-06-02 NOTE — PROGRESS NOTES
Problem: Mobility Impaired (Adult and Pediatric)  Goal: *Acute Goals and Plan of Care (Insert Text)  Description: Goals:  (1.)Mr. Donovan will move from supine to sit and sit to supine , scoot up and down and roll side to side with MODIFIED INDEPENDENCE within 5 treatment day(s). (2.)Mr. Donovan will tolerate sitting up on the EOB for 5-8 minutes without support within 5 treatment day(s)  (3.)Mr. Donovan will participate in BLE AAROM strength exercises in supine and sitting 2 x 10 reps within 5 treatment day(s)  (4). Mr. Nato Newton will transfer sit to stand and SPT with CGA using the r/walker within 5 treatment day(s)  (5.) Mr. Nato Newton will ambulate with LRAD for ' with LRAD with CGA within 5 treatment day(s)      PHYSICAL THERAPY: Daily Note and AM 6/2/2020  INPATIENT: PT Visit Days : 4  [place] / [place] / Product Type: Medicare /       NAME/AGE/GENDER: Kalin Mccain is a 82 y.o. male   PRIMARY DIAGNOSIS: Lumbar stenosis with neurogenic claudication [M48.062]  Spinal stenosis [M48.00] Spinal stenosis, lumbar region, with neurogenic claudication Spinal stenosis, lumbar region, with neurogenic claudication  Procedure(s) (LRB):  T11-L1 LAMINECTOMY/ T9-L1 FUSION (N/A)  4 Days Post-Op  ICD-10: Treatment Diagnosis:    · Generalized Muscle Weakness (M62.81)  · Difficulty in walking, Not elsewhere classified (R26.2)  · Other abnormalities of gait and mobility (R26.89)  · Low Back Pain (M54.5)   Precaution/Allergies:  Lipitor [atorvastatin] and Plavix [clopidogrel]      ASSESSMENT:         Patient was supine upon contact and agreeable to PT. Patient performs supine to sit with SBA, additional time, and cues for log rolling technique. Once standing patient ambulates 100' with rolling walker, CGA and cues for posture/sequencing with rolling walker as well as proximity to rolling walker.  Encouraged patient to sit up in recliner chair post treatment but patient requests to return to supine post treatment which he does so with SBA. Overall slow progress towards physical therapy goals. Patient's goals listed above are still appropriate. Will continue skilled PT to address remaining deficits. This section established at most recent assessment   PROBLEM LIST (Impairments causing functional limitations):  1. Decreased Strength  2. Decreased ADL/Functional Activities  3. Decreased Transfer Abilities  4. Decreased Ambulation Ability/Technique  5. Increased Pain  6. Decreased Activity Tolerance   INTERVENTIONS PLANNED: (Benefits and precautions of physical therapy have been discussed with the patient.)  1. Bed Mobility  2. Gait Training  3. Therapeutic Activites  4. Therapeutic Exercise/Strengthening  5. Transfer Training     TREATMENT PLAN: Frequency/Duration: twice daily for duration of hospital stay  Rehabilitation Potential For Stated Goals: Good     REHAB RECOMMENDATIONS (at time of discharge pending progress):    Placement: It is my opinion, based on this patient's performance to date, that Mr. Donovan may benefit from intensive therapy at a [address] [place] [address] after discharge due to the functional deficits listed above that are likely to improve with skilled rehabilitation and concerns that he/she may be unsafe to be unsupervised at home due to being below baseline and at an increased risk for falls. Vs HHPT pending progress    Equipment:    None at this time              HISTORY:   History of Present Injury/Illness (Reason for Referral): This is an 82-year-old who had multiple surgeries by Dr. Bree Chauhan. At this point he is fused L1 to S1, but he has instrumentation just at L1-L2-L3 and then a single screw at L5 on one side. He had done well for several years but started having recurrent symptoms. Sounds like stenosis, and an MRI scan did show stenosis at T12, L1, and he would get better with epidural blocks, but it would wear off.   It has gotten progressively worse, and he is very limited on his ability to stand and walk. We have been planning to do a fusion for a long time, extend this fusion up, but because of the COVID infection this had to be postponed. He has required narcotics monthly to manage this. We got him scheduled. We are going to decompress the T12/L1 level. We might even check up T11-T12, but we are going to add pedicle screws T10 down and connect the rods. Hopefully this will give him satisfactory relief. He tells me today that he has had some pain in the SI joints for a while, and he gets regular SI joint injections from Dr. Lan Moran and wanted to know about the possibility of an SI joint fusion. I discussed it with him. In general though I think it might make him extremely stiff. I would be wary about it, but certainly I think he needs to get over this surgery and see how he feels and see if that is still an issue. Past Medical History/Comorbidities:   Mr. Kaye Andrew  has a past medical history of BPH (benign prostatic hyperplasia), CAD (coronary artery disease) (2016), Chronic pain, GERD (gastroesophageal reflux disease), Hypertension, Kidney stone, Spinal stenosis, and Thyroid disease. He also has no past medical history of Difficult intubation, Malignant hyperthermia due to anesthesia, Nausea & vomiting, or Pseudocholinesterase deficiency. Mr. Kaye Andrew  has a past surgical history that includes hx appendectomy (1957); hx heart catheterization (,2016, 2017); neurological procedure unlisted (2001); hx lumbar fusion (2002); hx lumbar fusion (2006); hx lumbar fusion; hx cataract removal (Bilateral); and hx colonoscopy.   Social History/Living Environment:   Home Environment: Private residence  # Steps to Enter: 1  Wheelchair Ramp: No  One/Two Story Residence: One story  Living Alone: No  Support Systems: Spouse/Significant Other/Partner  Patient Expects to be Discharged to[de-identified] Private residence  Current DME Used/Available at Home: Kory Powell, [address] [facility] [place], straight, Wheelchair  Prior Level of Function/Work/Activity:  Independent with use of a RLE AFO without an assistive device. Number of Personal Factors/Comorbidities that affect the Plan of Care: 1-2: MODERATE COMPLEXITY   EXAMINATION:   Most Recent Physical Functioning:   Gross Assessment:                  Posture:     Balance:  Sitting: Intact  Standing: Impaired; With support  Standing - Static: Good  Standing - Dynamic : Fair Bed Mobility:  Supine to Sit: Stand-by assistance  Sit to Supine: Stand-by assistance  Wheelchair Mobility:     Transfers: Unable to assess secondary to pain  Sit to Stand: Contact guard assistance  Stand to Sit: Contact guard assistance  Gait: Unable to assess secondary to pain     Base of Support: Narrowed  Speed/Gilda: Slow;Shuffled  Step Length: Left shortened;Right shortened  Distance (ft): 100 Feet (ft)  Assistive Device: Walker, rolling  Ambulation - Level of Assistance: Contact guard assistance  Interventions: Safety awareness training; Tactile cues; Verbal cues      Body Structures Involved:  1. Bones  2. Joints  3. Muscles Body Functions Affected:  1. Neuromusculoskeletal  2. Movement Related Activities and Participation Affected:  1. General Tasks and Demands  2. Mobility  3. Self Care   Number of elements that affect the Plan of Care: 3: MODERATE COMPLEXITY   CLINICAL PRESENTATION:   Presentation: Evolving clinical presentation with changing clinical characteristics: MODERATE COMPLEXITY   CLINICAL DECISION MAKIN [facility] Inpatient Short Form  How much difficulty does the patient currently have. .. Unable A Lot A Little None   1. Turning over in bed (including adjusting bedclothes, sheets and blankets)? [x] 1   [] 2   [] 3   [] 4   2. Sitting down on and standing up from a chair with arms ( e.g., wheelchair, bedside commode, etc.)   [x] 1   [] 2   [] 3   [] 4   3. Moving from lying on back to sitting on the side of the bed?    [] 1 [x] 2   [] 3   [] 4   How much help from another person does the patient currently need. .. Total A Lot A Little None   4. Moving to and from a bed to a chair (including a wheelchair)? [x] 1   [] 2   [] 3   [] 4   5. Need to walk in hospital room? [x] 1   [] 2   [] 3   [] 4   6. Climbing 3-5 steps with a railing? [x] 1   [] 2   [] 3   [] 4   © 2007, Trustees of [address] [place] [address] [address] [address], under license to [employer]. All rights reserved      Score:  Initial: 8 Most Recent: X (Date: -- )    Interpretation of Tool:  Represents activities that are increasingly more difficult (i.e. Bed mobility, Transfers, Gait). Medical Necessity:     · Patient is expected to demonstrate progress in   · strength and functional technique  ·  to   · increase independence with bed mobility, SPT and gait with LRAD   · . Reason for Services/Other Comments:  · Patient continues to require skilled intervention due to   · medical complications  · . Use of outcome tool(s) and clinical judgement create a POC that gives a: Questionable prediction of patient's progress: MODERATE COMPLEXITY            TREATMENT:   (In addition to Assessment/Re-Assessment sessions the following treatments were rendered)   Pre-treatment Symptoms/Complaints:  \"see above  Pain: Initial:   Pain Intensity 1: 0  Post Session:         Therapeutic Activity: (    15 minutes): Therapeutic activities including bed mobility training, transfer training, ambulation on level ground, static/dynamic sitting/standing balance, scooting, posture training, instruction in sequencing with rolling walker, and patient education to improve mobility, strength and balance. Required minutes Safety awareness training; Tactile cues; Verbal cues to promote static and dynamic balance in standing and promote coordination of bilateral, lower extremity(s).          Braces/Orthotics/Lines/Etc:   · None  Treatment/Session Assessment:    · Response to Treatment: Anxious    · Interdisciplinary Collaboration:   o Physical Therapy Assistant  o Registered Nurse  · After treatment position/precautions:   o Supine in bed  o Bed alarm/tab alert on  o Bed/Chair-wheels locked  o Bed in low position  o Call light within reach  o RN notified  o Family at bedside   · Compliance with Program/Exercises: Will assess as treatment progresses  · Recommendations/Intent for next treatment session: \"Next visit will focus on advancements to more challenging activities and reduction in assistance provided\".   Total Treatment Duration:  PT Patient Time In/Time Out  Time In: 1410  Time Out: [facility]

## 2020-06-02 NOTE — PROGRESS NOTES
Covid Test ordered. Pt and wife had stated that the pt said he had one on the 20th prior to surgery and said he never wanted another one. Explained that all patients going to rehab facilities are required to have the test.  Pt was taken downstairs and before the discussion was finished, pt's wife left to go home. Pt had stated earlier that she was taking him home and that he was not going to a rehab facility but decided to have PPD placed \"just in case. \"  Attempted to call the patient's wife at home at both numbers listed. No voice mail picked up, no answer.   Informed oncoming RN

## 2020-06-02 NOTE — PROGRESS NOTES
Pt is an 81 yo male admitted for a T11-L1 LAMINECTOMY/ T9-L1 FUSION due to Lumbar stenosis with neurogenic claudication. SW was consulted to assist with dc planning. SW met with the pt and spouse to discuss dc needs. Per hospital protocol, SW wore mask and maintained appropriate social distance; no direct physical contact. Spouse stated that she and the pt have talked and the pt wants to dc to home. She stated she \"knows how to take care of him\" and feels comfortable doing so at dc. She confirmed they have all needed DME in the home. They are agreeable to [facility] services. No agency preference and from list of providers, they are ok with referral to [facility] [place]. Order received and referral submitted to [facility] [place]. No other dc needs or concerns identified or reported. SW remains available to assist as needed. \Care Management Interventions  PCP Verified by CM: Yes  Mode of Transport at Discharge: Other (see comment)  Transition of Care Consult (CM Consult): Discharge Planning, [address]: Yes  Discharge Durable Medical Equipment: No  Physical Therapy Consult: Yes  Occupational Therapy Consult: Yes  Current Support Network: Own Home, Lives with Spouse  Confirm Follow Up Transport: Family  The Plan for Transition of Care is Related to the Following Treatment Goals : Home health therapy services to assist pt to improve his functional abilities.   The Patient and/or Patient Representative was Provided with a Choice of Provider and Agrees with the Discharge Plan?: Yes  Freedom of Choice List was Provided with Basic Dialogue that Supports the Patient's Individualized Plan of Care/Goals, Treatment Preferences and Shares the Quality Data Associated with the Providers?: Yes  Discharge Location  Discharge Placement: Home with home health([facility])

## 2020-06-02 NOTE — PROGRESS NOTES
Problem: Mobility Impaired (Adult and Pediatric)  Goal: *Acute Goals and Plan of Care (Insert Text)  Description: Goals:  (1.)Mr. Donovan will move from supine to sit and sit to supine , scoot up and down and roll side to side with MODIFIED INDEPENDENCE within 5 treatment day(s). (2.)Mr. Donovan will tolerate sitting up on the EOB for 5-8 minutes without support within 5 treatment day(s)  (3.)Mr. Donovan will participate in BLE AAROM strength exercises in supine and sitting 2 x 10 reps within 5 treatment day(s)  (4). Mr. Tanner Weiss will transfer sit to stand and SPT with CGA using the r/walker within 5 treatment day(s)  (5.) Mr. Tanner Weiss will ambulate with LRAD for ' with LRAD with CGA within 5 treatment day(s)      PHYSICAL THERAPY: Daily Note and AM 6/2/2020  INPATIENT: PT Visit Days : 4  [place] / Product Type: Medicare /       NAME/AGE/GENDER: Dorothy Rincon is a 82 y.o. male   PRIMARY DIAGNOSIS: Lumbar stenosis with neurogenic claudication [M48.062]  Spinal stenosis [M48.00] Spinal stenosis, lumbar region, with neurogenic claudication Spinal stenosis, lumbar region, with neurogenic claudication  Procedure(s) (LRB):  T11-L1 LAMINECTOMY/ T9-L1 FUSION (N/A)  4 Days Post-Op  ICD-10: Treatment Diagnosis:    · Generalized Muscle Weakness (M62.81)  · Difficulty in walking, Not elsewhere classified (R26.2)  · Other abnormalities of gait and mobility (R26.89)  · Low Back Pain (M54.5)   Precaution/Allergies:  Lipitor [atorvastatin] and Plavix [clopidogrel]      ASSESSMENT:         Patient was supine upon contact and agreeable to PT. Patient performs supine to sit with SBA, additional time, and cues for log rolling technique. Therapist dons RLE AFO and slippers with total assist. Patient then transfers to standing with CGA and cues for hand placement/technique.  Once standing patient ambulates 100' with rolling walker, CGA and cues for posture/sequencing with rolling walker as well as proximity to rolling walker. Improved gait ability noted this morning with improved balance and posture requiring less assistance from therapist. Patient returns to recliner chair where he participates in therapeutic strengthening exercises to improve functional strength for transfers, gait and overall mobility. Patient requires cues to perform exercises correctly. Encouraged patient to sit up in recliner chair post treatment but patient requests to return to supine post treatment which he does so with SBA. Overall slow progress towards physical therapy goals. Patient's goals listed above are still appropriate. Will continue skilled PT to address remaining deficits. This section established at most recent assessment   PROBLEM LIST (Impairments causing functional limitations):  1. Decreased Strength  2. Decreased ADL/Functional Activities  3. Decreased Transfer Abilities  4. Decreased Ambulation Ability/Technique  5. Increased Pain  6. Decreased Activity Tolerance   INTERVENTIONS PLANNED: (Benefits and precautions of physical therapy have been discussed with the patient.)  1. Bed Mobility  2. Gait Training  3. Therapeutic Activites  4. Therapeutic Exercise/Strengthening  5. Transfer Training     TREATMENT PLAN: Frequency/Duration: twice daily for duration of hospital stay  Rehabilitation Potential For Stated Goals: Good     REHAB RECOMMENDATIONS (at time of discharge pending progress):    Placement: It is my opinion, based on this patient's performance to date, that Mr. Donovan may benefit from intensive therapy at a [address] [place] [address] after discharge due to the functional deficits listed above that are likely to improve with skilled rehabilitation and concerns that he/she may be unsafe to be unsupervised at home due to being below baseline and at an increased risk for falls.  Vs HHPT pending progress    Equipment:    None at this time              HISTORY:   History of Present Injury/Illness (Reason for Referral): This is an 72-year-old who had multiple surgeries by Dr. Jethro Duvall. At this point he is fused L1 to S1, but he has instrumentation just at L1-L2-L3 and then a single screw at L5 on one side. He had done well for several years but started having recurrent symptoms. Sounds like stenosis, and an MRI scan did show stenosis at T12, L1, and he would get better with epidural blocks, but it would wear off. It has gotten progressively worse, and he is very limited on his ability to stand and walk. We have been planning to do a fusion for a long time, extend this fusion up, but because of the COVID infection this had to be postponed. He has required narcotics monthly to manage this. We got him scheduled. We are going to decompress the T12/L1 level. We might even check up T11-T12, but we are going to add pedicle screws T10 down and connect the rods. Hopefully this will give him satisfactory relief. He tells me today that he has had some pain in the SI joints for a while, and he gets regular SI joint injections from Dr. Kelly Pickard and wanted to know about the possibility of an SI joint fusion. I discussed it with him. In general though I think it might make him extremely stiff. I would be wary about it, but certainly I think he needs to get over this surgery and see how he feels and see if that is still an issue. Past Medical History/Comorbidities:   Mr. Arnav Ernandez  has a past medical history of BPH (benign prostatic hyperplasia), CAD (coronary artery disease) (2016), Chronic pain, GERD (gastroesophageal reflux disease), Hypertension, Kidney stone, Spinal stenosis, and Thyroid disease. He also has no past medical history of Difficult intubation, Malignant hyperthermia due to anesthesia, Nausea & vomiting, or Pseudocholinesterase deficiency.   Mr. Arnav Ernandez  has a past surgical history that includes hx appendectomy (1957); hx heart catheterization (,2016, 2017); neurological procedure unlisted (2001); hx lumbar fusion (2002); hx lumbar fusion (2006); hx lumbar fusion; hx cataract removal (Bilateral); and hx colonoscopy. Social History/Living Environment:   Home Environment: Private residence  # Steps to Enter: 1  Wheelchair Ramp: No  One/Two Story Residence: One story  Living Alone: No  Support Systems: Spouse/Significant Other/Partner  Patient Expects to be Discharged to[de-identified] Private residence  Current DME Used/Available at Home: Walker, rolling, Debbora Olp, straight, Wheelchair  Prior Level of Function/Work/Activity:  Independent with use of a RLE AFO without an assistive device. Number of Personal Factors/Comorbidities that affect the Plan of Care: 1-2: MODERATE COMPLEXITY   EXAMINATION:   Most Recent Physical Functioning:   Gross Assessment:                  Posture:     Balance:  Sitting: Intact  Standing: Impaired; With support  Standing - Static: Good  Standing - Dynamic : Fair Bed Mobility:  Supine to Sit: Stand-by assistance  Sit to Supine: Stand-by assistance  Wheelchair Mobility:     Transfers: Unable to assess secondary to pain  Sit to Stand: Contact guard assistance  Stand to Sit: Contact guard assistance  Gait: Unable to assess secondary to pain     Base of Support: Narrowed  Speed/Gilda: Slow;Shuffled  Step Length: Left shortened;Right shortened  Distance (ft): 100 Feet (ft)  Assistive Device: Walker, rolling  Ambulation - Level of Assistance: Contact guard assistance  Interventions: Safety awareness training; Tactile cues; Verbal cues      Body Structures Involved:  1. Bones  2. Joints  3. Muscles Body Functions Affected:  1. Neuromusculoskeletal  2. Movement Related Activities and Participation Affected:  1. General Tasks and Demands  2. Mobility  3.  Self Care   Number of elements that affect the Plan of Care: 3: MODERATE COMPLEXITY   CLINICAL PRESENTATION:   Presentation: Evolving clinical presentation with changing clinical characteristics: MODERATE COMPLEXITY   CLINICAL DECISION MAKING:   [employer] AM-PAC 6 Clicks   Basic Mobility Inpatient Short Form  How much difficulty does the patient currently have. .. Unable A Lot A Little None   1. Turning over in bed (including adjusting bedclothes, sheets and blankets)? [x] 1   [] 2   [] 3   [] 4   2. Sitting down on and standing up from a chair with arms ( e.g., wheelchair, bedside commode, etc.)   [x] 1   [] 2   [] 3   [] 4   3. Moving from lying on back to sitting on the side of the bed? [] 1   [x] 2   [] 3   [] 4   How much help from another person does the patient currently need. .. Total A Lot A Little None   4. Moving to and from a bed to a chair (including a wheelchair)? [x] 1   [] 2   [] 3   [] 4   5. Need to walk in hospital room? [x] 1   [] 2   [] 3   [] 4   6. Climbing 3-5 steps with a railing? [x] 1   [] 2   [] 3   [] 4   © 2007, Trustees of [facility] [employer], under license to [employer]. All rights reserved      Score:  Initial: 8 Most Recent: X (Date: -- )    Interpretation of Tool:  Represents activities that are increasingly more difficult (i.e. Bed mobility, Transfers, Gait). Medical Necessity:     · Patient is expected to demonstrate progress in   · strength and functional technique  ·  to   · increase independence with bed mobility, SPT and gait with LRAD   · . Reason for Services/Other Comments:  · Patient continues to require skilled intervention due to   · medical complications  · . Use of outcome tool(s) and clinical judgement create a POC that gives a: Questionable prediction of patient's progress: MODERATE COMPLEXITY            TREATMENT:   (In addition to Assessment/Re-Assessment sessions the following treatments were rendered)   Pre-treatment Symptoms/Complaints:  \"see above  Pain: Initial:   Pain Intensity 1: 0  Post Session:         Therapeutic Activity: (    24 minutes):   Therapeutic activities including bed mobility training, transfer training from various surface heights, ambulation on level ground, static/dynamic sitting/standing balance, LE exercises, scooting, posture training, instruction in sequencing with rolling walker, and patient education to improve mobility, strength and balance. Required minutes Safety awareness training; Tactile cues; Verbal cues to promote static and dynamic balance in standing and promote coordination of bilateral, lower extremity(s). Braces/Orthotics/Lines/Etc:   · None  Treatment/Session Assessment:    · Response to Treatment: Anxious    · Interdisciplinary Collaboration:   o Physical Therapy Assistant  o Registered Nurse  · After treatment position/precautions:   o Supine in bed  o Bed alarm/tab alert on  o Bed/Chair-wheels locked  o Bed in low position  o Call light within reach  o RN notified   · Compliance with Program/Exercises: Will assess as treatment progresses  · Recommendations/Intent for next treatment session: \"Next visit will focus on advancements to more challenging activities and reduction in assistance provided\".   Total Treatment Duration:  PT Patient Time In/Time Out  Time In: 0932  Time Out: 20[address] PTA

## 2020-06-02 NOTE — PROGRESS NOTES
ORTHO PROGRESS NOTE    2020    Admit Date: 2020  Admit Diagnosis: Lumbar stenosis with neurogenic claudication [M48.062]  Spinal stenosis [M48.00]  Post Op day: 4 Days Post-Op      Subjective:     Jennifer Donovan is a patient who is now 4 Days Post-Op  and has no complaints. Objective:     PT/OT:    Slow progression    Vital Signs:    Patient Vitals for the past 8 hrs:   BP Temp Pulse Resp SpO2   20 1156 135/74 98.1 °F (36.7 °C) 88 19 97 %     Temp (24hrs), Av.5 °F (36.9 °C), Min:98.1 °F (36.7 °C), Max:99 °F (37.2 °C)      LAB:    No results for input(s): HGB, WBC, PLT, HGBEXT, PLTEXT in the last 72 hours. I/O:  No intake/output data recorded.  1901 -  0700  In: -   Out: 305 [Urine:200; Drains:105]    Physical Exam:    Awake and in no acute distress. Mood and affect appropriate. Respirations unlabored and no evidence cyanosis. Calves nontender. Abdomen soft and nontender. Dressing clean/dry  No new neurologic deficit.     Assessment:      Patient Active Problem List   Diagnosis Code    GERD (gastroesophageal reflux disease) K21.9    Thyroid disease E07.9    Chronic pain G89.29    Spinal stenosis, lumbar region, with neurogenic claudication M48.062    Elevated blood pressure [id number]    Spinal stenosis M48.00       4 Days Post-Op STATUS POST Procedure(s):  T11-L1 LAMINECTOMY/ T9-L1 FUSION      Plan:     Continue PT/OT  Anticipate discharge to: HOME tomorrow      Signed By: El Victoria MD

## 2020-06-03 VITALS
DIASTOLIC BLOOD PRESSURE: 78 MMHG | TEMPERATURE: 98.1 F | WEIGHT: 186.13 LBS | BODY MASS INDEX: 25.21 KG/M2 | RESPIRATION RATE: 18 BRPM | HEART RATE: 88 BPM | SYSTOLIC BLOOD PRESSURE: 146 MMHG | OXYGEN SATURATION: 95 % | HEIGHT: 72 IN

## 2020-06-03 PROCEDURE — 74011250636 HC RX REV CODE- 250/636: Performed by: PHYSICIAN ASSISTANT

## 2020-06-03 PROCEDURE — 74011250637 HC RX REV CODE- 250/637: Performed by: PHYSICIAN ASSISTANT

## 2020-06-03 PROCEDURE — 74011250637 HC RX REV CODE- 250/637: Performed by: ORTHOPAEDIC SURGERY

## 2020-06-03 PROCEDURE — 97530 THERAPEUTIC ACTIVITIES: CPT

## 2020-06-03 RX ADMIN — Medication 1 AMPULE: at 08:51

## 2020-06-03 RX ADMIN — DEXTROSE MONOHYDRATE, SODIUM CHLORIDE, AND POTASSIUM CHLORIDE 75 ML/HR: 50; 4.5; 1.49 INJECTION, SOLUTION INTRAVENOUS at 03:57

## 2020-06-03 RX ADMIN — HYDROCODONE BITARTRATE AND ACETAMINOPHEN 1 TABLET: 10; 325 TABLET ORAL at 09:01

## 2020-06-03 RX ADMIN — LISINOPRIL 5 MG: 5 TABLET ORAL at 08:51

## 2020-06-03 RX ADMIN — DOCUSATE SODIUM 100 MG: 100 CAPSULE, LIQUID FILLED ORAL at 08:50

## 2020-06-03 RX ADMIN — TRAMADOL HYDROCHLORIDE 50 MG: 50 TABLET, FILM COATED ORAL at 00:53

## 2020-06-03 RX ADMIN — HYDROCODONE BITARTRATE AND ACETAMINOPHEN 1 TABLET: 10; 325 TABLET ORAL at 13:20

## 2020-06-03 RX ADMIN — DOXAZOSIN 2 MG: 1 TABLET ORAL at 08:51

## 2020-06-03 RX ADMIN — CYCLOBENZAPRINE 5 MG: 10 TABLET, FILM COATED ORAL at 03:54

## 2020-06-03 RX ADMIN — ASPIRIN 81 MG 81 MG: 81 TABLET ORAL at 08:51

## 2020-06-03 RX ADMIN — PANTOPRAZOLE SODIUM 40 MG: 40 TABLET, DELAYED RELEASE ORAL at 04:00

## 2020-06-03 RX ADMIN — LEVOTHYROXINE SODIUM 100 MCG: 100 TABLET ORAL at 04:00

## 2020-06-03 RX ADMIN — POLYETHYLENE GLYCOL 3350 17 G: 17 POWDER, FOR SOLUTION ORAL at 08:50

## 2020-06-03 RX ADMIN — HYDROCODONE BITARTRATE AND ACETAMINOPHEN 1 TABLET: 10; 325 TABLET ORAL at 03:54

## 2020-06-03 NOTE — PROGRESS NOTES
ORTHO PROGRESS NOTE    Zahida 3, 2020    Admit Date: 2020  Admit Diagnosis: Lumbar stenosis with neurogenic claudication [M48.062]  Spinal stenosis [M48.00]  Post Op day: 5 Days Post-Op      Subjective:     Carin Donovan is a patient who is now 5 Days Post-Op  and has no complaints. Objective:     PT/OT:    Doing well    Vital Signs:    Patient Vitals for the past 8 hrs:   BP Temp Pulse Resp SpO2   20 1150 146/78 98.1 °F (36.7 °C) 88 18 95 %   20 0740 129/68 98.4 °F (36.9 °C) 70 18 95 %     Temp (24hrs), Av.6 °F (37 °C), Min:97.7 °F (36.5 °C), Max:100.5 °F (38.1 °C)      LAB:    No results for input(s): HGB, WBC, PLT, HGBEXT, PLTEXT in the last 72 hours. I/O:  No intake/output data recorded. No intake/output data recorded. Physical Exam:    Awake and in no acute distress. Mood and affect appropriate. Respirations unlabored and no evidence cyanosis. Calves nontender. Abdomen soft and nontender. Dressing clean/dry  No new neurologic deficit.     Assessment:      Patient Active Problem List   Diagnosis Code    GERD (gastroesophageal reflux disease) K21.9    Thyroid disease E07.9    Chronic pain G89.29    Spinal stenosis, lumbar region, with neurogenic claudication M48.062    Elevated blood pressure [id number]    Spinal stenosis M48.00       5 Days Post-Op STATUS POST Procedure(s):  T11-L1 LAMINECTOMY/ T9-L1 FUSION      Plan:      Anticipate discharge to: HOME      Signed By: Nydia English MD

## 2020-06-03 NOTE — CDMP QUERY
Pt admitted for spinal surgery. Pt noted to have distended abdomen. If possible, please document in progress notes and d/c summary: ? Ileus as a post-operative complication ? Ileus as an expected/inherent condition that occurred post-operatively and not a complication ? No Ileus ? Other, please specify ? Unable to determine The medical record reflects the following: 
               Risk Factors: surgery, taking Norco and Dilaudid for post op pain Clinical indicators: Documentation of distended abdomen, no flatus on POD#3. As per xray \"Gaseous distended bowel suggesting ileus versus developing obstruction\" Treatment:  KUB. Diet held. Reglan. Mobilization. Thank You, Barbara Estrella RN CDS 
CDI

## 2020-06-03 NOTE — PROGRESS NOTES
Pt's D/C instructions completed. Verbalized understanding of all instructions including diet, activity, s/sx to alert MD, medications, wound care, and f/u appointment. Family at [facility].

## 2020-06-03 NOTE — PROGRESS NOTES
Problem: Mobility Impaired (Adult and Pediatric)  Goal: *Acute Goals and Plan of Care (Insert Text)  Description: Goals:  (1.)Mr. Donovan will move from supine to sit and sit to supine , scoot up and down and roll side to side with MODIFIED INDEPENDENCE within 5 treatment day(s). (2.)Mr. Donovan will tolerate sitting up on the EOB for 5-8 minutes without support within 5 treatment day(s) GOAL MET 6/3/2020  (3.)Mr. Donovan will participate in BLE AAROM strength exercises in supine and sitting 2 x 10 reps within 5 treatment day(s)  (4). Mr. Kaye Andrew will transfer sit to stand and SPT with CGA using the r/walker within 5 treatment day(s) GOAL MET 6/3/2020  (5.) Mr. Kaye Andrew will ambulate with LRAD for ' with LRAD with CGA within 5 treatment day(s) GOAL MET 6/3/2020      PHYSICAL THERAPY: Daily Note and AM 6/3/2020  INPATIENT: PT Visit Days : 5  [place] / [place] / Product Type: Medicare /       NAME/AGE/GENDER: Jackson Galvan is a 82 y.o. male   PRIMARY DIAGNOSIS: Lumbar stenosis with neurogenic claudication [M48.062]  Spinal stenosis [M48.00] Spinal stenosis, lumbar region, with neurogenic claudication Spinal stenosis, lumbar region, with neurogenic claudication  Procedure(s) (LRB):  T11-L1 LAMINECTOMY/ T9-L1 FUSION (N/A)  5 Days Post-Op  ICD-10: Treatment Diagnosis:    · Generalized Muscle Weakness (M62.81)  · Difficulty in walking, Not elsewhere classified (R26.2)  · Other abnormalities of gait and mobility (R26.89)  · Low Back Pain (M54.5)   Precaution/Allergies:  Lipitor [atorvastatin] and Plavix [clopidogrel]      ASSESSMENT:         Patient was supine upon contact and agreeable to PT. Patient performs supine to sit with SBA, additional time, and cues for log rolling technique. Once seated EOB patient request to get dressed.  Patient performs several sit to stand transfer needing CGA-SBA and cues for technique as well as static/dynamic standing balance demonstrating fair static/dynamic balance during ADL activity. Patient then ambulates 140' with rolling walker, CGA and cues for posture/sequencing with rolling walker as well as proximity to rolling walker. Patient then participates in therapeutic strengthening exercises to improve functional strength for transfers, gait and overall mobility. Patient requires cues to perform exercises correctly. Patient returns to EOB and to supine with SBA. Overall good progress towards physical therapy goals. Patient has met the above goals in red and all other goals listed above are still appropriate. Will continue efforts as patient is still below functional baseline. This section established at most recent assessment   PROBLEM LIST (Impairments causing functional limitations):  1. Decreased Strength  2. Decreased ADL/Functional Activities  3. Decreased Transfer Abilities  4. Decreased Ambulation Ability/Technique  5. Increased Pain  6. Decreased Activity Tolerance   INTERVENTIONS PLANNED: (Benefits and precautions of physical therapy have been discussed with the patient.)  1. Bed Mobility  2. Gait Training  3. Therapeutic Activites  4. Therapeutic Exercise/Strengthening  5. Transfer Training     TREATMENT PLAN: Frequency/Duration: twice daily for duration of hospital stay  Rehabilitation Potential For Stated Goals: Good     REHAB RECOMMENDATIONS (at time of discharge pending progress):    Placement: It is my opinion, based on this patient's performance to date, that Mr. Donovan may benefit from intensive therapy at a [address] [place] [address] after discharge due to the functional deficits listed above that are likely to improve with skilled rehabilitation and concerns that he/she may be unsafe to be unsupervised at home due to being below baseline and at an increased risk for falls. Vs HHPT pending progress    Equipment:    None at this time              HISTORY:   History of Present Injury/Illness (Reason for Referral):   This is an 82-year-old who had multiple surgeries by Dr. Jinny Sawant. At this point he is fused L1 to S1, but he has instrumentation just at L1-L2-L3 and then a single screw at L5 on one side. He had done well for several years but started having recurrent symptoms. Sounds like stenosis, and an MRI scan did show stenosis at T12, L1, and he would get better with epidural blocks, but it would wear off. It has gotten progressively worse, and he is very limited on his ability to stand and walk. We have been planning to do a fusion for a long time, extend this fusion up, but because of the COVID infection this had to be postponed. He has required narcotics monthly to manage this. We got him scheduled. We are going to decompress the T12/L1 level. We might even check up T11-T12, but we are going to add pedicle screws T10 down and connect the rods. Hopefully this will give him satisfactory relief. He tells me today that he has had some pain in the SI joints for a while, and he gets regular SI joint injections from Dr. Ling Alexandra and wanted to know about the possibility of an SI joint fusion. I discussed it with him. In general though I think it might make him extremely stiff. I would be wary about it, but certainly I think he needs to get over this surgery and see how he feels and see if that is still an issue. Past Medical History/Comorbidities:   Mr. Keira Dickerson  has a past medical history of BPH (benign prostatic hyperplasia), CAD (coronary artery disease) (2016), Chronic pain, GERD (gastroesophageal reflux disease), Hypertension, Kidney stone, Spinal stenosis, and Thyroid disease. He also has no past medical history of Difficult intubation, Malignant hyperthermia due to anesthesia, Nausea & vomiting, or Pseudocholinesterase deficiency.   Mr. Keira Dickerson  has a past surgical history that includes hx appendectomy (1957); hx heart catheterization (,2016, 2017); neurological procedure unlisted (2001); hx lumbar fusion (2002); hx lumbar fusion (2006); hx lumbar fusion; hx cataract removal (Bilateral); and hx colonoscopy. Social History/Living Environment:   Home Environment: Private residence  # Steps to Enter: 1  Wheelchair Ramp: No  One/Two Story Residence: One story  Living Alone: No  Support Systems: Spouse/Significant Other/Partner  Patient Expects to be Discharged to[de-identified] Private residence  Current DME Used/Available at Home: Walker, rolling, [address], [place], straight, Wheelchair  Prior Level of Function/Work/Activity:  Independent with use of a RLE AFO without an assistive device. Number of Personal Factors/Comorbidities that affect the Plan of Care: 1-2: MODERATE COMPLEXITY   EXAMINATION:   Most Recent Physical Functioning:   Gross Assessment:                  Posture:     Balance:  Sitting: Intact  Standing: Impaired; With support  Standing - Static: Good  Standing - Dynamic : Fair Bed Mobility:  Supine to Sit: Stand-by assistance  Wheelchair Mobility:     Transfers: Unable to assess secondary to pain  Sit to Stand: Contact guard assistance  Stand to Sit: Contact guard assistance  Gait: Unable to assess secondary to pain     Base of Support: Narrowed  Speed/Gilda: Slow;Shuffled  Step Length: Left shortened;Right shortened  Distance (ft): 140 Feet (ft)  Assistive Device: Walker, rolling  Ambulation - Level of Assistance: Contact guard assistance  Interventions: Tactile cues; Verbal cues; Safety awareness training      Body Structures Involved:  1. Bones  2. Joints  3. Muscles Body Functions Affected:  1. Neuromusculoskeletal  2. Movement Related Activities and Participation Affected:  1. General Tasks and Demands  2. Mobility  3.  Self Care   Number of elements that affect the Plan of Care: 3: MODERATE COMPLEXITY   CLINICAL PRESENTATION:   Presentation: Evolving clinical presentation with changing clinical characteristics: MODERATE COMPLEXITY   CLINICAL DECISION MAKIN [facility] [address] [id number] [id number] 6 [facility]   Basic Mobility Inpatient Short Form  How much difficulty does the patient currently have. .. Unable A Lot A Little None   1. Turning over in bed (including adjusting bedclothes, sheets and blankets)? [x] 1   [] 2   [] 3   [] 4   2. Sitting down on and standing up from a chair with arms ( e.g., wheelchair, bedside commode, etc.)   [x] 1   [] 2   [] 3   [] 4   3. Moving from lying on back to sitting on the side of the bed? [] 1   [x] 2   [] 3   [] 4   How much help from another person does the patient currently need. .. Total A Lot A Little None   4. Moving to and from a bed to a chair (including a wheelchair)? [x] 1   [] 2   [] 3   [] 4   5. Need to walk in hospital room? [x] 1   [] 2   [] 3   [] 4   6. Climbing 3-5 steps with a railing? [x] 1   [] 2   [] 3   [] 4   © 2007, Trustees of [address] [place] [address], under license to [employer]. All rights reserved      Score:  Initial: 8 Most Recent: X (Date: -- )    Interpretation of Tool:  Represents activities that are increasingly more difficult (i.e. Bed mobility, Transfers, Gait). Medical Necessity:     · Patient is expected to demonstrate progress in   · strength and functional technique  ·  to   · increase independence with bed mobility, SPT and gait with LRAD   · . Reason for Services/Other Comments:  · Patient continues to require skilled intervention due to   · medical complications  · . Use of outcome tool(s) and clinical judgement create a POC that gives a: Questionable prediction of patient's progress: MODERATE COMPLEXITY            TREATMENT:   (In addition to Assessment/Re-Assessment sessions the following treatments were rendered)   Pre-treatment Symptoms/Complaints:  see above  Pain: Initial:   Pain Intensity 1: 0  Post Session:         Therapeutic Activity: (    23 minutes):   Therapeutic activities including bed mobility training, transfer training, ambulation on level ground, static/dynamic sitting/standing balance, scooting, posture training, instruction in sequencing with rolling walker, and patient education to improve mobility, strength and balance. Required minutes Tactile cues; Verbal cues; Safety awareness training to promote static and dynamic balance in standing and promote coordination of bilateral, lower extremity(s). Braces/Orthotics/Lines/Etc:   · None  Treatment/Session Assessment:    · Response to Treatment: Anxious    · Interdisciplinary Collaboration:   o Physical Therapy Assistant  o Registered Nurse  · After treatment position/precautions:   o Supine in bed  o Bed alarm/tab alert on  o Bed/Chair-wheels locked  o Bed in low position  o Call light within reach  o RN notified   · Compliance with Program/Exercises: Will assess as treatment progresses  · Recommendations/Intent for next treatment session: \"Next visit will focus on advancements to more challenging activities and reduction in assistance provided\".   Total Treatment Duration:  PT Patient Time In/Time Out  Time In: 0833  Time Out: 085 Oscar Carlos, PTA

## 2020-06-03 NOTE — PROGRESS NOTES
Pt awake and alert. Abdomen is still distended but much improved. +BS. Pain continues to be an issue. Poor appetite. Taking very little of clear liquid diet.   Wants to go home

## 2020-06-03 NOTE — PROGRESS NOTES
Pt is medically cleared for discharge to home today with [facility] PT/OT services through [facility]. No other dc needs or concerns identified or reported. Care Management Interventions  PCP Verified by CM: Yes  Mode of Transport at Discharge: Other (see comment)  Transition of Care Consult (CM Consult): Discharge Planning, [address]: Yes  Discharge Durable Medical Equipment: No  Physical Therapy Consult: Yes  Occupational Therapy Consult: Yes  Current Support Network: Own Home, Lives with Spouse  Confirm Follow Up Transport: Family  The Plan for Transition of Care is Related to the Following Treatment Goals : Home health therapy services to assist pt to improve his functional abilities.   The Patient and/or Patient Representative was Provided with a Choice of Provider and Agrees with the Discharge Plan?: Yes  Freedom of Choice List was Provided with Basic Dialogue that Supports the Patient's Individualized Plan of Care/Goals, Treatment Preferences and Shares the Quality Data Associated with the Providers?: Yes  Discharge Location  Discharge Placement: Home with home health([facility])

## 2020-06-03 NOTE — DISCHARGE INSTRUCTIONS
Patient Education        Lumbar Spinal Fusion: What to Expect at Home  Your Recovery     After surgery, you can expect your back to feel stiff and sore. You may have trouble sitting or standing in one position for very long and may need pain medicine in the weeks after your surgery. It may take 4 to 6 weeks to get back to doing simple activities, such as light housework. It may take 6 months to a year for your back to get better completely. You may need to wear a back brace while your back heals. And your doctor may have you go to physical therapy. If your job doesn't require physical labor, you will probably be able to go back to work after 1 or 2 months. If your job involves light physical labor, it may take 3 to 6 months. Most people whose jobs involved heavy labor can never return to those jobs. This care sheet gives you a general idea about how long it will take for you to recover. But each person recovers at a different pace. Follow the steps below to get better as quickly as possible. How can you care for yourself at home? Activity  · Rest when you feel tired. Getting enough sleep will help you recover. · Try to walk each day. Start by walking a little more than you did the day before. Bit by bit, increase the amount you walk. Walking boosts blood flow and helps prevent pneumonia and constipation. Walking may also decrease your muscle soreness after surgery. · If advised by your doctor, you may need to avoid lifting anything that would cause excessive strain on your back. This may include a child, heavy grocery bags and milk containers, a heavy briefcase or backpack, cat litter or dog food bags, or a vacuum . · Avoid strenuous activities, such as bicycle riding, jogging, weight lifting, or aerobic exercise, until your doctor says it is okay. · Do not drive for 2 to 4 weeks after your surgery or until your doctor says it is okay.   · Avoid riding in a car for more than 30 minutes at a time for 2 to 4 weeks after surgery. If you must ride in a car for a longer distance, stop often to walk and stretch your legs. · Try to change your position about every 30 minutes while sitting or standing. This will help decrease your back pain while you are healing. · You will probably need to take at least 4 to 6 weeks off from work. It depends on the type of work you do and how you feel. · You may have sex as soon as you feel able, but avoid positions that put stress on your back or cause pain. Diet  · You can eat your normal diet. If your stomach is upset, try bland, low-fat foods like plain rice, broiled chicken, toast, and yogurt. · Drink plenty of fluids (unless your doctor tells you not to). · You may notice that your bowel movements are not regular right after your surgery. This is common. Try to avoid constipation and straining with bowel movements. You may want to take a fiber supplement every day. If you have not had a bowel movement after a couple of days, ask your doctor about taking a mild laxative. Medicines  · Be safe with medicines. Take pain medicines exactly as directed. ? If the doctor gave you a prescription medicine for pain, take it as prescribed. ? If you are not taking a prescription pain medicine, ask your doctor if you can take an over-the-counter medicine. · If your doctor prescribed antibiotics, take them as directed. Do not stop taking them just because you feel better. You need to take the full course of antibiotics. · If you think your pain medicine is making you sick to your stomach:  ? Take your medicine after meals (unless your doctor has told you not to). ? Ask your doctor for a different pain medicine. Incision care  · You will be given specific instructions about how to care for the cuts (incisions) the doctor made. The instructions will depend on the type of materials used to close the cut. Exercise  · Do back exercises as instructed by your doctor.   · Your doctor may advise you to work with a physical therapist to improve the strength and flexibility of your back. Other instructions  · To reduce stiffness and help sore muscles, use a warm water bottle, a heating pad set on low, or a warm cloth on your back. Do not put heat right over the incision. Do not go to sleep with a heating pad on your skin. Follow-up care is a key part of your treatment and safety. Be sure to make and go to all appointments, and call your doctor if you are having problems. It's also a good idea to know your test results and keep a list of the medicines you take. When should you call for help? [id number] anytime you think you may need emergency care. For example, call if:  · You passed out (lost consciousness). · You have sudden chest pain and shortness of breath, or you cough up blood. · You are unable to move a leg at all. Call your doctor now or seek immediate medical care if:  · You have pain that does not get better after you take pain pills. · You have new or worse symptoms in your legs or buttocks. Symptoms may include:  ? Numbness or tingling. ? Weakness. ? Pain. · You lose bladder or bowel control. · You have loose stitches, or your incision comes open. · You have blood or fluid draining from the incision. · You have signs of infection, such as:  ? Increased pain, swelling, warmth, or redness. ? Pus draining from the incision. ? A fever. Watch closely for any changes in your health, and be sure to contact your doctor if:  · You do not have a bowel movement after taking a laxative. · You are not getting better as expected. Where can you learn more? Go to http://myra-frank.info/  Enter N972 in the search box to learn more about \"Lumbar Spinal Fusion: What to Expect at Home. \"  Current as of: March 2, 2020               Content Version: 12.5  © 5090-7293 [employer].    Care instructions adapted under license by [employer] (which disclaims liability or warranty for this information). If you have questions about a medical condition or this instruction, always ask your healthcare professional. [address] any warranty or liability for your use of this information. Discharge Instructions - Lumbar Fusion    Incision  Please have someone check your incisions daily. If any of the following should occur, please call the office:   Drainage from incision site   Opening of incisions   Fevers greater than 101 degrees   Flu-like symptoms   Increased redness and/or tenderness  If you have staples or sutures instead of tape on your incision, they may be removed 10-14 days following your surgery. This may be done by a visiting nurse, rehab physician, or at your first follow up visit at our office. Otherwise, if your wound is covered with tape strips, they will typically fall of with showering. Brace  If a brace is prescribed, wear it at all times except for sleeping and showering. Always wear a t-shirt under your brace so that it is not directly in contact with your bare skin. The brace may cause you to sweat and you may feel warm. This can irritate your skin so pay special attention to the incision. Showering  If your surgeon allows, you may shower as normal once the large, bulky bandages are removed from your incision. If they are not removed before your discharge from the hospital, you may remove them 36-48 hours after your surgery. Hair washing is permissible while in the shower. No tub baths, hot tubs or whirlpools until seen in the office. You may remove your brace for showering. Exercise  Lift only objects weighing less than 10-15 lbs. Do not bend or twist at the waist. Always bend with your knees! Limit your sitting to 20-30 minute intervals. You should lie down or walk in between sitting periods. There are no limitations for sitting in a recliner chair.    Walk as much as possible and let discomfort. Pain  Take pain medicine as prescribed. As your pain level decreases, you may begin to take over-the-counter Extra-Strength Tylenol. Do NOT take any anti-inflammatory (Advil, Aleve, Motrin) medications for 10 weeks after surgery. Do not resume taking Fosamax for eight weeks after your fusion surgery. Driving  You may NOT drive a car until told otherwise by your physician. You may be a passenger for short distances (about 20-30 minutes). If you must take a longer trip, be sure to make several pit stops so that you can walk and stretch your legs. Reclining in the passenger seat seems to be the most comfortable position for most patients. Follow-Up Appointments  When you are discharged from the hospital, a follow up appointment will be made for 2-3 weeks from your surgery date. Call [phone] to confirm your appointment. If you have additional questions or concerns, call our office ([id number] [facility]) [phone].

## 2020-06-04 ENCOUNTER — HOME CARE VISIT (OUTPATIENT)
Dept: SCHEDULING | Facility: HOME HEALTH | Age: 85
End: 2020-06-04
Payer: MEDICARE

## 2020-06-04 PROCEDURE — 3331090001 HH PPS REVENUE CREDIT

## 2020-06-04 PROCEDURE — G0151 HHCP-SERV OF PT,EA 15 MIN: HCPCS

## 2020-06-04 PROCEDURE — 400013 HH SOC

## 2020-06-04 PROCEDURE — 3331090002 HH PPS REVENUE DEBIT

## 2020-06-05 PROCEDURE — 3331090002 HH PPS REVENUE DEBIT

## 2020-06-05 PROCEDURE — 3331090001 HH PPS REVENUE CREDIT

## 2020-06-06 PROCEDURE — 3331090001 HH PPS REVENUE CREDIT

## 2020-06-06 PROCEDURE — 3331090002 HH PPS REVENUE DEBIT

## 2020-06-07 PROCEDURE — 3331090001 HH PPS REVENUE CREDIT

## 2020-06-07 PROCEDURE — 3331090002 HH PPS REVENUE DEBIT

## 2020-06-08 ENCOUNTER — HOME CARE VISIT (OUTPATIENT)
Dept: SCHEDULING | Facility: HOME HEALTH | Age: 85
End: 2020-06-08
Payer: MEDICARE

## 2020-06-08 VITALS
HEART RATE: 72 BPM | SYSTOLIC BLOOD PRESSURE: 114 MMHG | RESPIRATION RATE: 16 BRPM | DIASTOLIC BLOOD PRESSURE: 80 MMHG | TEMPERATURE: 98.2 F

## 2020-06-08 PROCEDURE — G0152 HHCP-SERV OF OT,EA 15 MIN: HCPCS

## 2020-06-08 PROCEDURE — 3331090002 HH PPS REVENUE DEBIT

## 2020-06-08 PROCEDURE — 3331090001 HH PPS REVENUE CREDIT

## 2020-06-09 ENCOUNTER — HOME CARE VISIT (OUTPATIENT)
Dept: SCHEDULING | Facility: HOME HEALTH | Age: 85
End: 2020-06-09
Payer: MEDICARE

## 2020-06-09 VITALS
SYSTOLIC BLOOD PRESSURE: 128 MMHG | HEART RATE: 78 BPM | DIASTOLIC BLOOD PRESSURE: 76 MMHG | RESPIRATION RATE: 18 BRPM | TEMPERATURE: 97.3 F

## 2020-06-09 PROCEDURE — 3331090001 HH PPS REVENUE CREDIT

## 2020-06-09 PROCEDURE — 3331090002 HH PPS REVENUE DEBIT

## 2020-06-09 PROCEDURE — G0151 HHCP-SERV OF PT,EA 15 MIN: HCPCS

## 2020-06-10 ENCOUNTER — HOME CARE VISIT (OUTPATIENT)
Dept: SCHEDULING | Facility: HOME HEALTH | Age: 85
End: 2020-06-10
Payer: MEDICARE

## 2020-06-10 VITALS
SYSTOLIC BLOOD PRESSURE: 100 MMHG | DIASTOLIC BLOOD PRESSURE: 72 MMHG | HEART RATE: 69 BPM | RESPIRATION RATE: 17 BRPM | TEMPERATURE: 98 F

## 2020-06-10 PROCEDURE — G0158 HHC OT ASSISTANT EA 15: HCPCS

## 2020-06-10 PROCEDURE — 3331090002 HH PPS REVENUE DEBIT

## 2020-06-10 PROCEDURE — 3331090001 HH PPS REVENUE CREDIT

## 2020-06-11 PROCEDURE — 3331090002 HH PPS REVENUE DEBIT

## 2020-06-11 PROCEDURE — 3331090001 HH PPS REVENUE CREDIT

## 2020-06-12 ENCOUNTER — HOME CARE VISIT (OUTPATIENT)
Dept: HOME HEALTH SERVICES | Facility: HOME HEALTH | Age: 85
End: 2020-06-12
Payer: MEDICARE

## 2020-06-12 PROCEDURE — 3331090002 HH PPS REVENUE DEBIT

## 2020-06-12 PROCEDURE — 3331090001 HH PPS REVENUE CREDIT

## 2020-06-13 PROCEDURE — 3331090002 HH PPS REVENUE DEBIT

## 2020-06-13 PROCEDURE — 3331090001 HH PPS REVENUE CREDIT

## 2020-06-14 PROCEDURE — 3331090001 HH PPS REVENUE CREDIT

## 2020-06-14 PROCEDURE — 3331090002 HH PPS REVENUE DEBIT

## 2020-06-15 ENCOUNTER — HOME CARE VISIT (OUTPATIENT)
Dept: SCHEDULING | Facility: HOME HEALTH | Age: 85
End: 2020-06-15
Payer: MEDICARE

## 2020-06-15 VITALS
HEART RATE: 75 BPM | TEMPERATURE: 98.1 F | RESPIRATION RATE: 16 BRPM | DIASTOLIC BLOOD PRESSURE: 61 MMHG | SYSTOLIC BLOOD PRESSURE: 100 MMHG

## 2020-06-15 PROCEDURE — 3331090002 HH PPS REVENUE DEBIT

## 2020-06-15 PROCEDURE — G0158 HHC OT ASSISTANT EA 15: HCPCS

## 2020-06-15 PROCEDURE — 3331090001 HH PPS REVENUE CREDIT

## 2020-06-15 PROCEDURE — G0157 HHC PT ASSISTANT EA 15: HCPCS

## 2020-06-16 VITALS
RESPIRATION RATE: 18 BRPM | HEART RATE: 64 BPM | DIASTOLIC BLOOD PRESSURE: 60 MMHG | SYSTOLIC BLOOD PRESSURE: 118 MMHG | TEMPERATURE: 98 F

## 2020-06-16 PROCEDURE — 3331090001 HH PPS REVENUE CREDIT

## 2020-06-16 PROCEDURE — 3331090002 HH PPS REVENUE DEBIT

## 2020-06-17 ENCOUNTER — HOME CARE VISIT (OUTPATIENT)
Dept: SCHEDULING | Facility: HOME HEALTH | Age: 85
End: 2020-06-17
Payer: MEDICARE

## 2020-06-17 VITALS
RESPIRATION RATE: 16 BRPM | DIASTOLIC BLOOD PRESSURE: 67 MMHG | HEART RATE: 97 BPM | TEMPERATURE: 98 F | SYSTOLIC BLOOD PRESSURE: 114 MMHG

## 2020-06-17 PROCEDURE — 3331090001 HH PPS REVENUE CREDIT

## 2020-06-17 PROCEDURE — G0158 HHC OT ASSISTANT EA 15: HCPCS

## 2020-06-17 PROCEDURE — 3331090002 HH PPS REVENUE DEBIT

## 2020-06-17 PROCEDURE — G0157 HHC PT ASSISTANT EA 15: HCPCS

## 2020-06-18 VITALS
DIASTOLIC BLOOD PRESSURE: 70 MMHG | SYSTOLIC BLOOD PRESSURE: 110 MMHG | HEART RATE: 96 BPM | TEMPERATURE: 98 F | RESPIRATION RATE: 18 BRPM

## 2020-06-18 PROCEDURE — 3331090001 HH PPS REVENUE CREDIT

## 2020-06-18 PROCEDURE — 3331090002 HH PPS REVENUE DEBIT

## 2020-06-18 NOTE — DISCHARGE SUMMARY
Discharge Summary    Patient ID:Jesse Allen  [id number]  1935  82 y.o. Admit date: 5/29/2020    Discharge date:6/3/2020    Admitting Physician: Gary Davis MD    Discharge Physician: Gary Davis MD    PREOPERATIVE DIAGNOSIS:  Thoracolumbar spinal stenosis with previous L1 to L3 instrumented fusion and segmental instability above the fusion.     POSTOPERATIVE DIAGNOSIS:  Thoracolumbar spinal stenosis with previous L1 to L3 instrumented fusion and segmental instability above the fusion.     PROCEDURES PERFORMED:  1.  Bilateral T11, T12 and L1 laminectomy, partial facetectomy. CPT codes 05218, 59507C4  2. Placement of segmental spinal instrumentation T9 through L3. CPT code 75392  3. Posterolateral fusion T9 through L2. CPT codes 93480, 78105S8  4. Local bone graft harvest with supplementation using OsteoAMP bone graft substitute. CPT code 00210     SURGEON:  John Hayes MD     ASSISTANT:  Staff.     ANESTHESIA:  GETA.     COMPLICATIONS:  None.        INDICATIONS:  The patient is an 28-year-old gentleman who has had a previous back surgery several times and fused L1 down to the sacrum, but he only had instrumentation currently L1 through L3 and had done well, but slowly overtime had developed segmental instability with recurrent stenosis above his fusion. We had managed him with epidural blocks for a period of time, but eventually this was unsuccessful and his activity and mobility level had markedly decreased and he was requiring narcotic pain medication, so he was brought for surgical treatment. Hospital Course: Patient admitted to ortho floor. Antibiotics were given postop. SCD and kvng hose were in place for DVT prophylaxis. Park catheter was in place until POD #1 then discontinued. Patient did not receive blood transfusion. The patient tolerated pain medications. He had abdominal distention with nausea and vomiting.  Methods to increase mobility and mobilize bowels were placed. The hemovac drain output gradually diminished and was then removed on POD #3. Dressing remained clean, dry, and intact. Physical Therapy started on the day following surgery and progressed to ambulation with walker assistance. By POD #5, he was ambulating well, had return of bowel function with BM. He was stable to discharge home. At the time of discharge, the patient had understanding of precautions needed following surgery. Postoperative complications requiring extended length of stay: post op ileus    Discharged to: Home    New Medications: Norco 10 mg    Discharge instructions:  -Resume pre hospital diet            -Resume home medications per medical continuation form     -Follow up in office as scheduled   -Call doctor immediately if T>100.5, increased pain, swelling, drainage.   -If shortness of breath or chest pain, immediately go to ER  -Post surgical instruction sheet given to patient    Signed:  CHANTELL Gresham  6/17/2020

## 2020-06-19 ENCOUNTER — HOME CARE VISIT (OUTPATIENT)
Dept: HOME HEALTH SERVICES | Facility: HOME HEALTH | Age: 85
End: 2020-06-19
Payer: MEDICARE

## 2020-06-19 PROCEDURE — 3331090002 HH PPS REVENUE DEBIT

## 2020-06-19 PROCEDURE — 3331090001 HH PPS REVENUE CREDIT

## 2020-06-20 ENCOUNTER — HOME CARE VISIT (OUTPATIENT)
Dept: SCHEDULING | Facility: HOME HEALTH | Age: 85
End: 2020-06-20
Payer: MEDICARE

## 2020-06-20 VITALS
SYSTOLIC BLOOD PRESSURE: 110 MMHG | HEART RATE: 81 BPM | TEMPERATURE: 98.1 F | DIASTOLIC BLOOD PRESSURE: 60 MMHG | RESPIRATION RATE: 18 BRPM | OXYGEN SATURATION: 98 %

## 2020-06-20 PROCEDURE — G0299 HHS/HOSPICE OF RN EA 15 MIN: HCPCS

## 2020-06-20 PROCEDURE — 3331090001 HH PPS REVENUE CREDIT

## 2020-06-20 PROCEDURE — 3331090002 HH PPS REVENUE DEBIT

## 2020-06-21 PROCEDURE — 3331090001 HH PPS REVENUE CREDIT

## 2020-06-21 PROCEDURE — 3331090002 HH PPS REVENUE DEBIT

## 2020-06-22 ENCOUNTER — HOME CARE VISIT (OUTPATIENT)
Dept: SCHEDULING | Facility: HOME HEALTH | Age: 85
End: 2020-06-22
Payer: MEDICARE

## 2020-06-22 VITALS
DIASTOLIC BLOOD PRESSURE: 60 MMHG | SYSTOLIC BLOOD PRESSURE: 120 MMHG | OXYGEN SATURATION: 96 % | HEART RATE: 84 BPM | TEMPERATURE: 97.9 F

## 2020-06-22 PROCEDURE — G0152 HHCP-SERV OF OT,EA 15 MIN: HCPCS

## 2020-06-22 PROCEDURE — 3331090001 HH PPS REVENUE CREDIT

## 2020-06-22 PROCEDURE — 3331090002 HH PPS REVENUE DEBIT

## 2020-06-23 ENCOUNTER — HOME CARE VISIT (OUTPATIENT)
Dept: SCHEDULING | Facility: HOME HEALTH | Age: 85
End: 2020-06-23
Payer: MEDICARE

## 2020-06-23 VITALS
DIASTOLIC BLOOD PRESSURE: 80 MMHG | RESPIRATION RATE: 18 BRPM | SYSTOLIC BLOOD PRESSURE: 120 MMHG | TEMPERATURE: 98.3 F | HEART RATE: 72 BPM

## 2020-06-23 VITALS
HEART RATE: 77 BPM | RESPIRATION RATE: 18 BRPM | DIASTOLIC BLOOD PRESSURE: 78 MMHG | TEMPERATURE: 97.9 F | SYSTOLIC BLOOD PRESSURE: 132 MMHG

## 2020-06-23 PROCEDURE — 3331090002 HH PPS REVENUE DEBIT

## 2020-06-23 PROCEDURE — 3331090001 HH PPS REVENUE CREDIT

## 2020-06-23 PROCEDURE — G0151 HHCP-SERV OF PT,EA 15 MIN: HCPCS

## 2020-06-24 PROCEDURE — 3331090001 HH PPS REVENUE CREDIT

## 2020-06-24 PROCEDURE — 3331090002 HH PPS REVENUE DEBIT

## 2020-06-25 PROCEDURE — 3331090002 HH PPS REVENUE DEBIT

## 2020-06-25 PROCEDURE — 3331090001 HH PPS REVENUE CREDIT

## 2020-06-26 ENCOUNTER — HOME CARE VISIT (OUTPATIENT)
Dept: SCHEDULING | Facility: HOME HEALTH | Age: 85
End: 2020-06-26
Payer: MEDICARE

## 2020-06-26 PROCEDURE — 3331090001 HH PPS REVENUE CREDIT

## 2020-06-26 PROCEDURE — G0151 HHCP-SERV OF PT,EA 15 MIN: HCPCS

## 2020-06-26 PROCEDURE — 3331090002 HH PPS REVENUE DEBIT

## 2020-06-27 PROCEDURE — 3331090002 HH PPS REVENUE DEBIT

## 2020-06-27 PROCEDURE — 3331090001 HH PPS REVENUE CREDIT

## 2020-06-28 VITALS
DIASTOLIC BLOOD PRESSURE: 78 MMHG | TEMPERATURE: 97.9 F | HEART RATE: 78 BPM | SYSTOLIC BLOOD PRESSURE: 126 MMHG | RESPIRATION RATE: 18 BRPM

## 2020-06-28 PROCEDURE — 3331090002 HH PPS REVENUE DEBIT

## 2020-06-28 PROCEDURE — 3331090001 HH PPS REVENUE CREDIT

## 2020-07-03 ENCOUNTER — HOSPITAL ENCOUNTER (INPATIENT)
Age: 85
LOS: 7 days | Discharge: HOME HEALTH | DRG: 871 | End: 2020-07-10
Attending: EMERGENCY MEDICINE | Admitting: FAMILY MEDICINE
Payer: MEDICARE

## 2020-07-03 ENCOUNTER — APPOINTMENT (OUTPATIENT)
Dept: MRI IMAGING | Age: 85
DRG: 871 | End: 2020-07-03
Attending: EMERGENCY MEDICINE
Payer: MEDICARE

## 2020-07-03 ENCOUNTER — APPOINTMENT (OUTPATIENT)
Dept: GENERAL RADIOLOGY | Age: 85
DRG: 871 | End: 2020-07-03
Attending: EMERGENCY MEDICINE
Payer: MEDICARE

## 2020-07-03 DIAGNOSIS — R63.0 APPETITE LOSS: Primary | ICD-10-CM

## 2020-07-03 DIAGNOSIS — M48.05 SPINAL STENOSIS OF THORACOLUMBAR REGION: ICD-10-CM

## 2020-07-03 DIAGNOSIS — R50.9 FEVER OF UNKNOWN ORIGIN: ICD-10-CM

## 2020-07-03 PROBLEM — Z20.822 SUSPECTED COVID-19 VIRUS INFECTION: Status: ACTIVE | Noted: 2020-07-03

## 2020-07-03 PROBLEM — J96.01 ACUTE RESPIRATORY FAILURE WITH HYPOXIA (HCC): Status: ACTIVE | Noted: 2020-07-03

## 2020-07-03 LAB
ALBUMIN SERPL-MCNC: 3.4 G/DL (ref 3.2–4.6)
ALBUMIN/GLOB SERPL: 0.9 {RATIO} (ref 1.2–3.5)
ALP SERPL-CCNC: 88 U/L (ref 50–136)
ALT SERPL-CCNC: 17 U/L (ref 12–65)
ANION GAP SERPL CALC-SCNC: 9 MMOL/L (ref 7–16)
APPEARANCE UR: CLEAR
AST SERPL-CCNC: 34 U/L (ref 15–37)
BACTERIA URNS QL MICRO: 0 /HPF
BASOPHILS # BLD: 0 K/UL (ref 0–0.2)
BASOPHILS NFR BLD: 0 % (ref 0–2)
BILIRUB SERPL-MCNC: 0.5 MG/DL (ref 0.2–1.1)
BILIRUB UR QL: NEGATIVE
BUN SERPL-MCNC: 11 MG/DL (ref 8–23)
CALCIUM SERPL-MCNC: 8.1 MG/DL (ref 8.3–10.4)
CASTS URNS QL MICRO: ABNORMAL /LPF
CHLORIDE SERPL-SCNC: 103 MMOL/L (ref 98–107)
CO2 SERPL-SCNC: 25 MMOL/L (ref 21–32)
COLOR UR: ABNORMAL
CREAT SERPL-MCNC: 0.88 MG/DL (ref 0.8–1.5)
DIFFERENTIAL METHOD BLD: ABNORMAL
EOSINOPHIL # BLD: 0 K/UL (ref 0–0.8)
EOSINOPHIL NFR BLD: 0 % (ref 0.5–7.8)
EPI CELLS #/AREA URNS HPF: ABNORMAL /HPF
ERYTHROCYTE [DISTWIDTH] IN BLOOD BY AUTOMATED COUNT: 13.6 % (ref 11.9–14.6)
GLOBULIN SER CALC-MCNC: 3.6 G/DL (ref 2.3–3.5)
GLUCOSE SERPL-MCNC: 120 MG/DL (ref 65–100)
GLUCOSE UR STRIP.AUTO-MCNC: NEGATIVE MG/DL
HCT VFR BLD AUTO: 37.3 % (ref 41.1–50.3)
HGB BLD-MCNC: 11.8 G/DL (ref 13.6–17.2)
HGB UR QL STRIP: NEGATIVE
IMM GRANULOCYTES # BLD AUTO: 0 K/UL (ref 0–0.5)
IMM GRANULOCYTES NFR BLD AUTO: 1 % (ref 0–5)
KETONES UR QL STRIP.AUTO: 15 MG/DL
LACTATE SERPL-SCNC: 1.3 MMOL/L (ref 0.4–2)
LEUKOCYTE ESTERASE UR QL STRIP.AUTO: NEGATIVE
LYMPHOCYTES # BLD: 1 K/UL (ref 0.5–4.6)
LYMPHOCYTES NFR BLD: 18 % (ref 13–44)
MCH RBC QN AUTO: 29.2 PG (ref 26.1–32.9)
MCHC RBC AUTO-ENTMCNC: 31.6 G/DL (ref 31.4–35)
MCV RBC AUTO: 92.3 FL (ref 79.6–97.8)
MONOCYTES # BLD: 0.3 K/UL (ref 0.1–1.3)
MONOCYTES NFR BLD: 6 % (ref 4–12)
NEUTS SEG # BLD: 4.1 K/UL (ref 1.7–8.2)
NEUTS SEG NFR BLD: 75 % (ref 43–78)
NITRITE UR QL STRIP.AUTO: NEGATIVE
NRBC # BLD: 0.02 K/UL (ref 0–0.2)
PH UR STRIP: 6 [PH] (ref 5–9)
PLATELET # BLD AUTO: 150 K/UL (ref 150–450)
PMV BLD AUTO: 8.8 FL (ref 9.4–12.3)
POTASSIUM SERPL-SCNC: 3.8 MMOL/L (ref 3.5–5.1)
PROCALCITONIN SERPL-MCNC: <0.05 NG/ML
PROT SERPL-MCNC: 7 G/DL (ref 6.3–8.2)
PROT UR STRIP-MCNC: 100 MG/DL
RBC # BLD AUTO: 4.04 M/UL (ref 4.23–5.6)
RBC #/AREA URNS HPF: ABNORMAL /HPF
SODIUM SERPL-SCNC: 137 MMOL/L (ref 136–145)
SP GR UR REFRACTOMETRY: 1.02 (ref 1–1.02)
UROBILINOGEN UR QL STRIP.AUTO: 0.2 EU/DL (ref 0.2–1)
WBC # BLD AUTO: 5.5 K/UL (ref 4.3–11.1)
WBC URNS QL MICRO: ABNORMAL /HPF

## 2020-07-03 PROCEDURE — 74011250637 HC RX REV CODE- 250/637: Performed by: EMERGENCY MEDICINE

## 2020-07-03 PROCEDURE — 81003 URINALYSIS AUTO W/O SCOPE: CPT

## 2020-07-03 PROCEDURE — 0T9B70Z DRAINAGE OF BLADDER WITH DRAINAGE DEVICE, VIA NATURAL OR ARTIFICIAL OPENING: ICD-10-PCS | Performed by: FAMILY MEDICINE

## 2020-07-03 PROCEDURE — 72157 MRI CHEST SPINE W/O & W/DYE: CPT

## 2020-07-03 PROCEDURE — 74011250636 HC RX REV CODE- 250/636: Performed by: EMERGENCY MEDICINE

## 2020-07-03 PROCEDURE — 80053 COMPREHEN METABOLIC PANEL: CPT

## 2020-07-03 PROCEDURE — 81001 URINALYSIS AUTO W/SCOPE: CPT

## 2020-07-03 PROCEDURE — A9575 INJ GADOTERATE MEGLUMI 0.1ML: HCPCS | Performed by: EMERGENCY MEDICINE

## 2020-07-03 PROCEDURE — 99285 EMERGENCY DEPT VISIT HI MDM: CPT

## 2020-07-03 PROCEDURE — 85025 COMPLETE CBC W/AUTO DIFF WBC: CPT

## 2020-07-03 PROCEDURE — 96360 HYDRATION IV INFUSION INIT: CPT

## 2020-07-03 PROCEDURE — 51702 INSERT TEMP BLADDER CATH: CPT

## 2020-07-03 PROCEDURE — 84145 PROCALCITONIN (PCT): CPT

## 2020-07-03 PROCEDURE — 83605 ASSAY OF LACTIC ACID: CPT

## 2020-07-03 PROCEDURE — 96361 HYDRATE IV INFUSION ADD-ON: CPT

## 2020-07-03 PROCEDURE — 74022 RADEX COMPL AQT ABD SERIES: CPT

## 2020-07-03 PROCEDURE — 65270000029 HC RM PRIVATE

## 2020-07-03 PROCEDURE — 72158 MRI LUMBAR SPINE W/O & W/DYE: CPT

## 2020-07-03 RX ORDER — ACETAMINOPHEN 500 MG
1000 TABLET ORAL
Status: COMPLETED | OUTPATIENT
Start: 2020-07-03 | End: 2020-07-03

## 2020-07-03 RX ORDER — ONDANSETRON 4 MG/1
4 TABLET, ORALLY DISINTEGRATING ORAL
Qty: 15 TAB | Refills: 0 | Status: SHIPPED | OUTPATIENT
Start: 2020-07-03

## 2020-07-03 RX ORDER — GADOTERATE MEGLUMINE 376.9 MG/ML
15 INJECTION INTRAVENOUS
Status: COMPLETED | OUTPATIENT
Start: 2020-07-03 | End: 2020-07-03

## 2020-07-03 RX ORDER — SODIUM CHLORIDE 0.9 % (FLUSH) 0.9 %
10 SYRINGE (ML) INJECTION
Status: COMPLETED | OUTPATIENT
Start: 2020-07-03 | End: 2020-07-03

## 2020-07-03 RX ORDER — VANCOMYCIN 2 GRAM/500 ML IN 0.9 % SODIUM CHLORIDE INTRAVENOUS
2000 ONCE
Status: COMPLETED | OUTPATIENT
Start: 2020-07-03 | End: 2020-07-04

## 2020-07-03 RX ADMIN — Medication 10 ML: at 19:40

## 2020-07-03 RX ADMIN — SODIUM CHLORIDE 1000 ML: 9 INJECTION, SOLUTION INTRAVENOUS at 17:25

## 2020-07-03 RX ADMIN — GADOTERATE MEGLUMINE 15 ML: 376.9 INJECTION INTRAVENOUS at 19:40

## 2020-07-03 RX ADMIN — ACETAMINOPHEN 1000 MG: 500 TABLET, FILM COATED ORAL at 23:04

## 2020-07-03 NOTE — ED TRIAGE NOTES
Patient to triage via wheelchair with mask in place. Patient states 4 weeks ago he had 4 vertebrae fused together. States he has had total of 5 surgeries on his back. States he has been having chills for 2 weeks as well. Reports generalized fatigue. Denies cough, SOB.

## 2020-07-03 NOTE — ED PROVIDER NOTES
Presents with complaint of fatigue and chills. Patient states he has had chills for about a week. He does not have a thermometer so did not check his temperature. He reports he has had difficulty eating because he has no appetite and he feels like it is hard to make food go down. He denies any cough shortness of breath chest pain abdominal pain nausea vomiting painful urination. He denies any increased pain in his back from his recent surgery. He has been taking 5 mg Norco every 6 hours so slightly constipated but had a bowel movement yesterday. He states he has seen Dr. Lance Herrera and there was no concerns at his follow-up appointment from his surgery approximately 1 month ago. He has been walking and has no new numbness or tingling. He reports no urine output since maybe in the middle of the night. The history is provided by the patient. Fatigue   This is a new problem. The current episode started more than 1 week ago. The problem has been gradually worsening. There was no focality noted. Pertinent negatives include no focal weakness, no loss of sensation, no loss of balance, no slurred speech, no speech difficulty, no mental status change, no unresponsiveness and no disorientation. Patient reports a subjective fever - was not measured. Pertinent negatives include no shortness of breath, no chest pain, no vomiting, no nausea, no bowel incontinence and no bladder incontinence.         Past Medical History:   Diagnosis Date    BPH (benign prostatic hyperplasia)     CAD (coronary artery disease) 2016    6 total stents, [place]    Chronic pain     back pain    GERD (gastroesophageal reflux disease)     controlled with otc meds    Hypertension     Kidney stone     Spinal stenosis     Thyroid disease     hypothyroidism       Past Surgical History:   Procedure Laterality Date    HX APPENDECTOMY  1957    HX CATARACT REMOVAL Bilateral     IOL    HX COLONOSCOPY      HX HEART CATHETERIZATION  ,2016, 2017    x3 LAD in 2017, x3 RCA in 2016   [address]      fusion lumbar- 2-3    HX LUMBAR FUSION  2006    fusion lumbar 3-4    HX LUMBAR FUSION      L5-S1 fusion    NEUROLOGICAL PROCEDURE UNLISTED      discectomy          Family History:   Problem Relation Age of Onset    Heart Disease Father     Heart Disease Brother     Stone Brother         Kidney stone    Cancer Son        Social History     Socioeconomic History    Marital status:      Spouse name: Not on file    Number of children: Not on file    Years of education: Not on file    Highest education level: Not on file   Occupational History    Not on file   Social Needs    Financial resource strain: Not on file    Food insecurity     Worry: Not on file     Inability: Not on file    Transportation needs     Medical: Not on file     Non-medical: Not on file   Tobacco Use    Smoking status: Former Smoker     Packs/day: 1.00     Years: 5.00     Pack years: 5.00     Last attempt to quit: 1965     Years since quittin.5    Smokeless tobacco: Never Used   Substance and Sexual Activity    Alcohol use: No    Drug use: No    Sexual activity: Not on file   Lifestyle    Physical activity     Days per week: Not on file     Minutes per session: Not on file    Stress: Not on file   Relationships    Social connections     Talks on phone: Not on file     Gets together: Not on file     Attends Yazidism service: Not on file     Active member of club or organization: Not on file     Attends meetings of clubs or organizations: Not on file     Relationship status: Not on file    Intimate partner violence     Fear of current or ex partner: Not on file     Emotionally abused: Not on file     Physically abused: Not on file     Forced sexual activity: Not on file   Other Topics Concern    Not on file   Social History Narrative    Not on file         ALLERGIES: Lipitor [atorvastatin] and Plavix [clopidogrel]    Review of Systems Constitutional: Positive for chills and fatigue. Respiratory: Negative for shortness of breath. Cardiovascular: Negative for chest pain. Gastrointestinal: Negative for bowel incontinence, nausea and vomiting. Genitourinary: Negative for bladder incontinence. Neurological: Negative for focal weakness, speech difficulty and loss of balance. All other systems reviewed and are negative. Vitals:    07/03/20 1126 07/03/20 1407 07/03/20 1410   BP: 155/71 133/70    Pulse: 99     Resp: 20     Temp: 98.4 °F (36.9 °C)  98 °F (36.7 °C)   SpO2: 98% 96%             Physical Exam  Vitals signs and nursing note reviewed. Constitutional:       General: He is not in acute distress. Appearance: He is well-developed. He is not diaphoretic. HENT:      Head: Normocephalic and atraumatic. Eyes:      General:         Right eye: No discharge. Left eye: No discharge. Conjunctiva/sclera: Conjunctivae normal.   Neck:      Musculoskeletal: Normal range of motion and neck supple. Cardiovascular:      Rate and Rhythm: Normal rate and regular rhythm. Pulmonary:      Effort: Pulmonary effort is normal. No respiratory distress. Breath sounds: Normal breath sounds. Abdominal:      General: There is no distension. Palpations: Abdomen is soft. There is no mass. Tenderness: There is no abdominal tenderness. Musculoskeletal: Normal range of motion. Right lower leg: No edema. Left lower leg: No edema. Comments: Post op site C/D/I   Skin:     General: Skin is warm and dry. Capillary Refill: Capillary refill takes less than 2 seconds. Neurological:      Mental Status: He is alert and oriented to person, place, and time. Cranial Nerves: No cranial nerve deficit.    Psychiatric:         Mood and Affect: Mood normal.         Behavior: Behavior normal.          MDM  Number of Diagnoses or Management Options  Diagnosis management comments: His lab work is unremarkable for infection. Given his inability to urinate a cruz was placed. 250 out. MRI ordered. D/w Dr. Allen Borrego. Pt also reported being depressed so this may be contributing to his sx. Amount and/or Complexity of Data Reviewed  Clinical lab tests: reviewed and ordered  Tests in the radiology section of CPT®: ordered and reviewed  Discuss the patient with other providers: yes    Risk of Complications, Morbidity, and/or Mortality  Presenting problems: high  Diagnostic procedures: moderate  Management options: moderate    Patient Progress  Patient progress: stable         Procedures               =================  Received handoff from Dr. Court Lauren. Instructed that if Dr. Allen Borrego felt that patient be discharged home then patient be discharged home with outpatient follow-up with his surgeon. At time of discharge patient was noted to be febrile with a temperature of 103.4. Patient tachycardic at 106. Spoke with Dr. Court Lauren who states that given patient's age and many risk factors and recent surgery that he will need to be covered with IV antibiotics and admitted for further work-up of his fever. Hospitalist consulted.     Godwin Johnson MD  7/3/20

## 2020-07-03 NOTE — ED NOTES
Pt states he has decrease in appetite and fluid consumption. Pt states he has no motivation to get up and do anything. Pt denies cough, SOB, known fever, but has chills. Pt states surgical site on mid-lower back has been drained x 2 after the surgery r/t fluid collection. Pt is not currently on ABT regimen.  Takes Norco 5 every 6 hours and last time was about 1000 this morning

## 2020-07-04 PROBLEM — U07.1 COVID-19 VIRUS INFECTION: Status: ACTIVE | Noted: 2020-07-04

## 2020-07-04 PROBLEM — I10 ESSENTIAL HYPERTENSION: Status: ACTIVE | Noted: 2020-07-04

## 2020-07-04 LAB
ANION GAP SERPL CALC-SCNC: 9 MMOL/L (ref 7–16)
BASOPHILS # BLD: 0 K/UL (ref 0–0.2)
BASOPHILS NFR BLD: 0 % (ref 0–2)
BUN SERPL-MCNC: 10 MG/DL (ref 8–23)
CALCIUM SERPL-MCNC: 7.8 MG/DL (ref 8.3–10.4)
CHLORIDE SERPL-SCNC: 106 MMOL/L (ref 98–107)
CO2 SERPL-SCNC: 24 MMOL/L (ref 21–32)
CREAT SERPL-MCNC: 0.71 MG/DL (ref 0.8–1.5)
DIFFERENTIAL METHOD BLD: ABNORMAL
EOSINOPHIL # BLD: 0 K/UL (ref 0–0.8)
EOSINOPHIL NFR BLD: 0 % (ref 0.5–7.8)
ERYTHROCYTE [DISTWIDTH] IN BLOOD BY AUTOMATED COUNT: 13.5 % (ref 11.9–14.6)
GLUCOSE SERPL-MCNC: 109 MG/DL (ref 65–100)
HCT VFR BLD AUTO: 34.1 % (ref 41.1–50.3)
HGB BLD-MCNC: 10.9 G/DL (ref 13.6–17.2)
IMM GRANULOCYTES # BLD AUTO: 0 K/UL (ref 0–0.5)
IMM GRANULOCYTES NFR BLD AUTO: 0 % (ref 0–5)
LYMPHOCYTES # BLD: 1.2 K/UL (ref 0.5–4.6)
LYMPHOCYTES NFR BLD: 21 % (ref 13–44)
MCH RBC QN AUTO: 29.2 PG (ref 26.1–32.9)
MCHC RBC AUTO-ENTMCNC: 32 G/DL (ref 31.4–35)
MCV RBC AUTO: 91.4 FL (ref 79.6–97.8)
MONOCYTES # BLD: 0.4 K/UL (ref 0.1–1.3)
MONOCYTES NFR BLD: 6 % (ref 4–12)
NEUTS SEG # BLD: 4.2 K/UL (ref 1.7–8.2)
NEUTS SEG NFR BLD: 72 % (ref 43–78)
NRBC # BLD: 0 K/UL (ref 0–0.2)
PLATELET # BLD AUTO: 148 K/UL (ref 150–450)
PMV BLD AUTO: 9.4 FL (ref 9.4–12.3)
POTASSIUM SERPL-SCNC: 3.6 MMOL/L (ref 3.5–5.1)
RBC # BLD AUTO: 3.73 M/UL (ref 4.23–5.6)
SODIUM SERPL-SCNC: 139 MMOL/L (ref 136–145)
WBC # BLD AUTO: 5.8 K/UL (ref 4.3–11.1)

## 2020-07-04 PROCEDURE — 74011250636 HC RX REV CODE- 250/636: Performed by: EMERGENCY MEDICINE

## 2020-07-04 PROCEDURE — 74011000258 HC RX REV CODE- 258: Performed by: EMERGENCY MEDICINE

## 2020-07-04 PROCEDURE — 65270000029 HC RM PRIVATE

## 2020-07-04 PROCEDURE — 87635 SARS-COV-2 COVID-19 AMP PRB: CPT

## 2020-07-04 PROCEDURE — 74011250637 HC RX REV CODE- 250/637: Performed by: FAMILY MEDICINE

## 2020-07-04 PROCEDURE — 74011250636 HC RX REV CODE- 250/636: Performed by: FAMILY MEDICINE

## 2020-07-04 PROCEDURE — 80048 BASIC METABOLIC PNL TOTAL CA: CPT

## 2020-07-04 PROCEDURE — 74011000258 HC RX REV CODE- 258: Performed by: FAMILY MEDICINE

## 2020-07-04 PROCEDURE — 85025 COMPLETE CBC W/AUTO DIFF WBC: CPT

## 2020-07-04 PROCEDURE — 36415 COLL VENOUS BLD VENIPUNCTURE: CPT

## 2020-07-04 PROCEDURE — 92610 EVALUATE SWALLOWING FUNCTION: CPT | Performed by: SPEECH-LANGUAGE PATHOLOGIST

## 2020-07-04 RX ORDER — DOCUSATE SODIUM 100 MG/1
200 CAPSULE, LIQUID FILLED ORAL DAILY
Status: DISCONTINUED | OUTPATIENT
Start: 2020-07-04 | End: 2020-07-10 | Stop reason: HOSPADM

## 2020-07-04 RX ORDER — ONDANSETRON 2 MG/ML
4 INJECTION INTRAMUSCULAR; INTRAVENOUS
Status: DISCONTINUED | OUTPATIENT
Start: 2020-07-04 | End: 2020-07-10 | Stop reason: HOSPADM

## 2020-07-04 RX ORDER — ADHESIVE BANDAGE
30 BANDAGE TOPICAL DAILY PRN
Status: DISCONTINUED | OUTPATIENT
Start: 2020-07-04 | End: 2020-07-10 | Stop reason: HOSPADM

## 2020-07-04 RX ORDER — SODIUM CHLORIDE 9 MG/ML
100 INJECTION, SOLUTION INTRAVENOUS CONTINUOUS
Status: DISCONTINUED | OUTPATIENT
Start: 2020-07-04 | End: 2020-07-06

## 2020-07-04 RX ORDER — LISINOPRIL 5 MG/1
5 TABLET ORAL DAILY
Status: DISCONTINUED | OUTPATIENT
Start: 2020-07-04 | End: 2020-07-10 | Stop reason: HOSPADM

## 2020-07-04 RX ORDER — HYDROCODONE BITARTRATE AND ACETAMINOPHEN 5; 325 MG/1; MG/1
1 TABLET ORAL
COMMUNITY

## 2020-07-04 RX ORDER — PANTOPRAZOLE SODIUM 40 MG/1
40 TABLET, DELAYED RELEASE ORAL
Status: DISCONTINUED | OUTPATIENT
Start: 2020-07-04 | End: 2020-07-10 | Stop reason: HOSPADM

## 2020-07-04 RX ORDER — LEVOTHYROXINE SODIUM 100 UG/1
100 TABLET ORAL
Status: DISCONTINUED | OUTPATIENT
Start: 2020-07-04 | End: 2020-07-10 | Stop reason: HOSPADM

## 2020-07-04 RX ORDER — ENOXAPARIN SODIUM 100 MG/ML
40 INJECTION SUBCUTANEOUS EVERY 24 HOURS
Status: DISCONTINUED | OUTPATIENT
Start: 2020-07-04 | End: 2020-07-10 | Stop reason: HOSPADM

## 2020-07-04 RX ORDER — ACETAMINOPHEN 325 MG/1
650 TABLET ORAL
Status: DISCONTINUED | OUTPATIENT
Start: 2020-07-04 | End: 2020-07-10 | Stop reason: HOSPADM

## 2020-07-04 RX ORDER — HYDROCODONE BITARTRATE AND ACETAMINOPHEN 5; 325 MG/1; MG/1
1 TABLET ORAL
Status: DISCONTINUED | OUTPATIENT
Start: 2020-07-04 | End: 2020-07-10 | Stop reason: HOSPADM

## 2020-07-04 RX ORDER — SODIUM CHLORIDE 0.9 % (FLUSH) 0.9 %
5-40 SYRINGE (ML) INJECTION AS NEEDED
Status: DISCONTINUED | OUTPATIENT
Start: 2020-07-04 | End: 2020-07-10 | Stop reason: HOSPADM

## 2020-07-04 RX ORDER — SODIUM CHLORIDE 0.9 % (FLUSH) 0.9 %
5-40 SYRINGE (ML) INJECTION EVERY 8 HOURS
Status: DISCONTINUED | OUTPATIENT
Start: 2020-07-04 | End: 2020-07-10 | Stop reason: HOSPADM

## 2020-07-04 RX ORDER — TRAMADOL HYDROCHLORIDE 50 MG/1
50 TABLET ORAL
Status: DISCONTINUED | OUTPATIENT
Start: 2020-07-04 | End: 2020-07-10 | Stop reason: HOSPADM

## 2020-07-04 RX ORDER — GABAPENTIN 300 MG/1
300 CAPSULE ORAL 3 TIMES DAILY
Status: DISCONTINUED | OUTPATIENT
Start: 2020-07-04 | End: 2020-07-10 | Stop reason: HOSPADM

## 2020-07-04 RX ORDER — GUAIFENESIN 100 MG/5ML
81 LIQUID (ML) ORAL DAILY
Status: DISCONTINUED | OUTPATIENT
Start: 2020-07-04 | End: 2020-07-10 | Stop reason: HOSPADM

## 2020-07-04 RX ORDER — DOXAZOSIN 1 MG/1
2 TABLET ORAL DAILY
Status: DISCONTINUED | OUTPATIENT
Start: 2020-07-04 | End: 2020-07-10 | Stop reason: HOSPADM

## 2020-07-04 RX ADMIN — PIPERACILLIN AND TAZOBACTAM 3.38 G: 3; .375 INJECTION, POWDER, FOR SOLUTION INTRAVENOUS at 20:23

## 2020-07-04 RX ADMIN — SODIUM CHLORIDE 100 ML/HR: 9 INJECTION, SOLUTION INTRAVENOUS at 02:16

## 2020-07-04 RX ADMIN — TRAMADOL HYDROCHLORIDE 50 MG: 50 TABLET, FILM COATED ORAL at 02:18

## 2020-07-04 RX ADMIN — GABAPENTIN 300 MG: 300 CAPSULE ORAL at 09:28

## 2020-07-04 RX ADMIN — ACETAMINOPHEN 650 MG: 325 TABLET, FILM COATED ORAL at 23:44

## 2020-07-04 RX ADMIN — PIPERACILLIN AND TAZOBACTAM 4.5 G: 4; .5 INJECTION, POWDER, FOR SOLUTION INTRAVENOUS at 00:18

## 2020-07-04 RX ADMIN — LISINOPRIL 5 MG: 5 TABLET ORAL at 09:28

## 2020-07-04 RX ADMIN — VANCOMYCIN HYDROCHLORIDE 1000 MG: 1 INJECTION, POWDER, LYOPHILIZED, FOR SOLUTION INTRAVENOUS at 21:20

## 2020-07-04 RX ADMIN — GABAPENTIN 300 MG: 300 CAPSULE ORAL at 16:00

## 2020-07-04 RX ADMIN — PANTOPRAZOLE SODIUM 40 MG: 40 TABLET, DELAYED RELEASE ORAL at 09:28

## 2020-07-04 RX ADMIN — DOXAZOSIN 2 MG: 1 TABLET ORAL at 09:28

## 2020-07-04 RX ADMIN — SODIUM CHLORIDE 100 ML/HR: 9 INJECTION, SOLUTION INTRAVENOUS at 17:38

## 2020-07-04 RX ADMIN — GABAPENTIN 300 MG: 300 CAPSULE ORAL at 22:58

## 2020-07-04 RX ADMIN — ASPIRIN 81 MG: 81 TABLET, CHEWABLE ORAL at 09:28

## 2020-07-04 RX ADMIN — ENOXAPARIN SODIUM 40 MG: 40 INJECTION SUBCUTANEOUS at 09:28

## 2020-07-04 RX ADMIN — LEVOTHYROXINE SODIUM 100 MCG: 0.1 TABLET ORAL at 09:28

## 2020-07-04 RX ADMIN — DOCUSATE SODIUM 200 MG: 100 CAPSULE, LIQUID FILLED ORAL at 09:28

## 2020-07-04 RX ADMIN — HYDROCODONE BITARTRATE AND ACETAMINOPHEN 1 TABLET: 5; 325 TABLET ORAL at 05:02

## 2020-07-04 RX ADMIN — HYDROCODONE BITARTRATE AND ACETAMINOPHEN 1 TABLET: 5; 325 TABLET ORAL at 21:21

## 2020-07-04 RX ADMIN — Medication 10 ML: at 14:00

## 2020-07-04 RX ADMIN — TRAMADOL HYDROCHLORIDE 50 MG: 50 TABLET, FILM COATED ORAL at 09:47

## 2020-07-04 RX ADMIN — VANCOMYCIN HYDROCHLORIDE 2000 MG: 10 INJECTION, POWDER, LYOPHILIZED, FOR SOLUTION INTRAVENOUS at 00:52

## 2020-07-04 NOTE — PROGRESS NOTES
Pharmacokinetic Consult to Pharmacist    Anibal Ho is a 82 y.o. male being treated for bone/joint infection with vancomycin and zosyn. Lab Results   Component Value Date/Time    BUN 10 07/04/2020 04:52 AM    Creatinine 0.71 (L) 07/04/2020 04:52 AM    WBC 5.8 07/04/2020 04:52 AM    Procalcitonin <0.05 07/03/2020 11:29 AM    Lactic acid 1.3 07/03/2020 02:54 PM      Estimated Creatinine Clearance: 86.5 mL/min (A) (based on SCr of 0.71 mg/dL (L)). CULTURES:  -    Day 1 of vancomycin. Goal trough is 15 -20. Vancomycin load done overnight with 2g x1. Will continue at 1g q 12 hours  Will continue to follow patient and order levels when clinically indicated.     Thank you,  Yusef Laws, PharmD  Clinical Pharmacist  [phone]

## 2020-07-04 NOTE — PROGRESS NOTES
07/04/20 0140   Dual Skin Pressure Injury Assessment   Dual Skin Pressure Injury Assessment WDL   Second Care Provider (Based on [address] [place] [address]) Israel Huber RN   Skin Integumentary   Skin Integumentary (WDL) WDL    Pressure  Injury Documentation No Pressure Injury Noted-Pressure Ulcer Prevention Initiated   Skin Color Appropriate for ethnicity   Skin Condition/Temp Warm;Dry   Skin Integrity Intact   Turgor Epidermis thin w/ loss of subcut tissue   Wound Prevention and Protection Methods   Orientation of Wound Prevention Posterior   Location of Wound Prevention Sacrum/Coccyx   Dressing Present  No   Wound Offloading (Prevention Methods) Bed, pressure reduction mattress     Patient has no pressure injuries at this time. 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 Patient also wears a brace for mobility on the RLE with weakness. This limb is smaller than the left limb. Patient also has a soft bulge to the medial area of back. Patient states he has had \"fluid drawn from this area twice since surgery 2 weeks ago. \"

## 2020-07-04 NOTE — PROGRESS NOTES
Dr Juan Liriano notified of previous Md note that patient was covid suspected. No test has been ordered. Supervisor is aware and is looking for bed for patient to transfer to. See new for covid testing and order to transfer patient to covid unit.

## 2020-07-04 NOTE — PROGRESS NOTES
Hospitalist Progress Note     Admit Date:  7/3/2020  1:42 PM   Name:  Susana Donovan   Age:  82 y.o.  :  1935   MRN:  [id number]   PCP:  Charly Willis MD  Treatment Team: Attending Provider: Esteban Jurado MD; Primary Nurse: Wagner Sanchez RN; Consulting Provider: Gus Whitman NP; Utilization Review: Sal Morrison RN    Subjective:   Magdalene Gardiner is an 82 y.o. male with a past medical history of CAD, HTN, hypthyroidism who presents to the ER with report of chills for the the past week or so. He also has had difficulty swallowing and eating per his wife's report. Denies any SOB, cough, or pain.     2020  C/o mild back pain  No difficulty breathing      Objective:     Patient Vitals for the past 24 hrs:   Temp Pulse Resp BP SpO2   20 1727 99 °F (37.2 °C) 76 16 102/62 96 %   20 0732 97.6 °F (36.4 °C) 92 18 154/87 94 %   20 0342 98 °F (36.7 °C) 96 20 131/77 96 %   20 0147 98.4 °F (36.9 °C) 96 22 120/74 95 %   20 0122    110/63    20 0116     (!) 81 %   20 0102    132/84 91 %   20 0053  96   94 %   20 0043    132/79    20 0022  96  116/70 90 %   20 0002 100.1 °F (37.8 °C) 99 18 109/59 92 %   20 2343  75  127/72 95 %   20 2340     95 %   20 2333     (!) 88 %   20 2331  97   91 %   20 2330  (!) 102   (!) 88 %   20 2300 (!) 103.4 °F (39.7 °C)       20 2222    119/69    20 2202    155/81    20 2143    151/20    141/77    20  (!) 106   93 %   20  (!) 103  163/77 92 %   20  (!) 109  140/90 92 %     Oxygen Therapy  O2 Sat (%): 96 % (20 1727)  Pulse via Oximetry: 92 beats per minute (20 0116)  O2 Device: Nasal cannula (20)  O2 Flow Rate (L/min): 2 l/min (20 2340)    Intake/Output Summary (Last 24 hours) at 2020 3[facility] filed at 7/4/2020 0732  Gross per 24 hour   Intake 1813.76 ml   Output 775 ml   Net 1038.76 ml         General:    Well nourished. Alert. On room air, normal sat  heent- normal  CV:   RRR. No murmur, rub, or gallop. Lungs:   Clear to auscultation bilaterally. No wheezing, rhonchi, or rales. Abdomen:   Soft, nontender, nondistended. Cns - no focal neurological deficits   Extremities: Warm and dry. No cyanosis or edema. Skin:     No rashes or jaundice. Thorasic spine region, fluctuant mass/fluid collection, no significant tenderness, no erythema. Data Review:  I have reviewed all labs, meds, telemetry events, and studies from the last 24 hours. Recent Results (from the past 24 hour(s))   METABOLIC PANEL, BASIC    Collection Time: 07/04/20  4:52 AM   Result Value Ref Range    Sodium 139 136 - 145 mmol/L    Potassium 3.6 3.5 - 5.1 mmol/L    Chloride 106 98 - 107 mmol/L    CO2 24 21 - 32 mmol/L    Anion gap 9 7 - 16 mmol/L    Glucose 109 (H) 65 - 100 mg/dL    BUN 10 8 - 23 MG/DL    Creatinine 0.71 (L) 0.8 - 1.5 MG/DL    GFR est AA >60 >60 ml/min/1.73m2    GFR est non-AA >60 >60 ml/min/1.73m2    Calcium 7.8 (L) 8.3 - 10.4 MG/DL   CBC WITH AUTOMATED DIFF    Collection Time: 07/04/20  4:52 AM   Result Value Ref Range    WBC 5.8 4.3 - 11.1 K/uL    RBC 3.73 (L) 4.23 - 5.6 M/uL    HGB 10.9 (L) 13.6 - 17.2 g/dL    HCT 34.1 (L) 41.1 - 50.3 %    MCV 91.4 79.6 - 97.8 FL    MCH 29.2 26.1 - 32.9 PG    MCHC 32.0 31.4 - 35.0 g/dL    RDW 13.5 11.9 - 14.6 %    PLATELET 542 (L) 231 - 450 K/uL    MPV 9.4 9.4 - 12.3 FL    ABSOLUTE NRBC 0.00 0.0 - 0.2 K/uL    DF AUTOMATED      NEUTROPHILS 72 43 - 78 %    LYMPHOCYTES 21 13 - 44 %    MONOCYTES 6 4.0 - 12.0 %    EOSINOPHILS 0 (L) 0.5 - 7.8 %    BASOPHILS 0 0.0 - 2.0 %    IMMATURE GRANULOCYTES 0 0.0 - 5.0 %    ABS. NEUTROPHILS 4.2 1.7 - 8.2 K/UL    ABS. LYMPHOCYTES 1.2 0.5 - 4.6 K/UL    ABS. MONOCYTES 0.4 0.1 - 1.3 K/UL    ABS.  EOSINOPHILS 0.0 0.0 - 0.8 K/UL ABS. BASOPHILS 0.0 0.0 - 0.2 K/UL    ABS. IMM. GRANS. 0.0 0.0 - 0.5 K/UL   SARS-COV-2    Collection Time: 07/04/20  8:30 AM   Result Value Ref Range    SARS-CoV-2 PENDING     Specimen source Nasopharyngeal      COVID-19 rapid test Detected (A) NOTD          All Micro Results     None          Current Meds:  Current Facility-Administered Medications   Medication Dose Route Frequency    aspirin chewable tablet 81 mg  81 mg Oral DAILY    docusate sodium (COLACE) capsule 200 mg  200 mg Oral DAILY    doxazosin (CARDURA) tablet 2 mg  2 mg Oral DAILY    pantoprazole (PROTONIX) tablet 40 mg  40 mg Oral ACB    gabapentin (NEURONTIN) capsule 300 mg  300 mg Oral TID    levothyroxine (SYNTHROID) tablet 100 mcg  100 mcg Oral ACB    lisinopriL (PRINIVIL, ZESTRIL) tablet 5 mg  5 mg Oral DAILY    traMADoL (ULTRAM) tablet 50 mg  50 mg Oral Q6H PRN    sodium chloride (NS) flush 5-40 mL  5-40 mL IntraVENous Q8H    sodium chloride (NS) flush 5-40 mL  5-40 mL IntraVENous PRN    acetaminophen (TYLENOL) tablet 650 mg  650 mg Oral Q4H PRN    ondansetron (ZOFRAN) injection 4 mg  4 mg IntraVENous Q4H PRN    magnesium hydroxide (MILK OF MAGNESIA) 400 mg/5 mL oral suspension 30 mL  30 mL Oral DAILY PRN    0.9% sodium chloride infusion  100 mL/hr IntraVENous CONTINUOUS    enoxaparin (LOVENOX) injection 40 mg  40 mg SubCUTAneous Q24H    HYDROcodone-acetaminophen (NORCO) 5-325 mg per tablet 1 Tab  1 Tab Oral Q6H PRN       Other Studies (last 24 hours):  Mri Thorac Spine W Wo Cont    Result Date: 7/4/2020  MRI thoracic spine without and with IV contrast July 3, 2020 Reference exam: MRI lumbar spine same day INDICATION: Thoracolumbar surgery 4 weeks ago with fusion, having chills for 2 weeks, generalized fatigue FINDINGS: Routine MRI of the thoracic spine was performed using sagittal and axial imaging with pre and postcontrast images using 15 cc Dotarem.  There is a fluid collection in the posterior soft tissues at the level of surgery, outside the thecal sac and spinal canal that extends from the T9 level down into the lumbar spine as described on the lumbar MR dated same day. Metallic artifact obscures the tip of the conus. There is mild disc space narrowing throughout the thoracic spine but T7-T8 shows disc height loss, with spurring contributing to mild right but moderate to severe left neural foraminal narrowing. T8-T9 shows spurs with moderate bilateral neural foraminal narrowing, and mild canal narrowing. T9-T10 shows bilateral pedicle screws and rods at H69, with metallic artifact with moderate to severe right and moderate left neural foraminal narrowing, with mild canal narrowing. T64-T17 shows metallic artifact with bilateral pedicle screws and rods causing artifact but with severe bilateral neural foraminal narrowing, and mild canal narrowing. T11-T12 shows extensive metallic artifact but with facet hypertrophy and disc-osteophyte complex with moderate to severe bilateral neural foraminal narrowing, with mild canal narrowing. T12-L1 shows extensive metallic artifact, with what appears to be moderate to severe bilateral neural foraminal narrowing but no canal stenosis. There is no abnormal area of enhancement to suggest active infectious process but again sensitivity is very low in the metallic screw regions. IMPRESSION: 1. Extensive metallic artifact and some motion artifact significantly decreases sensitivity for small lesions but no abnormal area of enhancement to suggest an active infectious process is seen. 2. Posterior probable postsurgical fluid collection extending into the lumbar spine as described on the lumbar MRI dated same day, and up to the T9 level. 3. Some degree of degenerative changes seen as described above but again sensitivity is somewhat decreased.     Mri Lumb Spine W Wo Cont    Result Date: 7/4/2020  MRI Lumbar spine without and with IV contrast July 3, 2020 Reference exam: MRI January 3, 2012 and plain film thoracolumbar spine May 29, 2020 Indication: Fusion surgery 4 weeks ago, having chills for 2 weeks, generalized fatigue Technique: Routine MRI of the lumbar spine was performed using sagittal and axial imaging with pre- and postcontrast images. 15 cc Dotarem contrast was administered to better delineate any infectious process. Findings: Motion artifact decreases sensitivity of the study. There is a large area of fluid in the surgical site posteriorly outside the thecal sac, that extends into the thoracic spine and extends down to the upper L3 level. Significant metallic artifact is seen at the surgical site as well. T12-L1 shows posterior protrusion, with some degree of foraminal stenosis bilaterally, mostly obscured by the metallic artifact on the axial imaging, sagittal imaging does suggest at least mild canal narrowing. L1-L2 shows bilateral pedicle screws and rods with metallic artifact with moderate bilateral neural foraminal narrowing but no canal stenosis. L2-L3 shows fusion across the disc space, with spurring contributing to mild bilateral neural foraminal narrowing but no canal stenosis. L3-L4 shows partial fusion, with facet hypertrophy with only mild bilateral neural foraminal narrowing but no canal stenosis. L4-L5 shows bilateral pedicle screws and rods at L5, with some very minimal anterior subluxation and disc space narrowing, with no canal or foraminal stenosis. L5-S1 shows facet hypertrophy and spurring with moderate bilateral neural foraminal narrowing and no canal stenosis although there does appear to be impingement on the right S1 root in the neural recess. There is no abnormal area of enhancement to suggest active infectious process or abscess. IMPRESSION: 1. Posterior postsurgical fluid collection measuring up to 7.8 x 4.1 cm extending from the L3 level cephalad into the thoracic spine above the level of the field-of-view of this study, please see MRI thoracic spine dated same day.  2. Motion artifact does decrease sensitivity of the study. 3. T12-L1 shows protrusion and some degree of bilateral neural foraminal narrowing with mild canal narrowing but please see thoracic spine dated same day. 4. L1-L2 shows moderate bilateral neural foraminal narrowing. 5. L2-L3 shows mild bilateral neural foraminal narrowing. 6. L3-L4 shows mild bilateral neural foraminal narrowing. 7. L4-L5 shows fusion and postsurgical changes with minimal anterior subluxation but no stenosis. 8. L5-S1 shows moderate bilateral neural foraminal narrowing with some impingement on the right S1 root in the neural recess. Assessment and Plan:     Hospital Problems as of 7/4/2020 Date Reviewed: 5/22/2015          Codes Class Noted - Resolved POA    Essential hypertension ICD-10-CM: I10  ICD-9-CM: 401.9  7/4/2020 - Present Unknown        * (Principal) Acute respiratory failure with hypoxia (HCC) ICD-10-CM: J96.01  ICD-9-CM: 518.81  7/3/2020 - Present Yes        Fever ICD-10-CM: R50.9  ICD-9-CM: 780.60  7/3/2020 - Present Yes        Suspected COVID-19 virus infection ICD-10-CM: Z20.828  ICD-9-CM: V01.79  7/3/2020 - Present Yes        GERD (gastroesophageal reflux disease) ICD-10-CM: K21.9  ICD-9-CM: 530.81  Unknown - Present Yes    Overview Signed 1/24/2012  9:53 AM by Hiram Young MD     controlled with otc meds             Thyroid disease ICD-10-CM: E07.9  ICD-9-CM: 246. 9  Unknown - Present Yes    Overview Signed 1/24/2012  9:53 AM by Hiram Young MD     hypothyroidism                   PLAN:    - hypoxia- improving, normal chest xray  - h/o fever , chills for a week- recent spine surgery, pt says fluid was drained by ortho spine in clinic about 2 weeks ago.  -r/o covid--pt will be transferred to covid floor, later on today covid test positive.   - gerd  - synthroid    DC planning/Dispo:    DVT ppx:  lovenox    Signed:  Luzma Perry MD

## 2020-07-04 NOTE — PROGRESS NOTES
Admission Assessment - Patient is alert and oriented x4. No complaint of pain at this time. Resp even and unlabored. Lung sounds clear. No cough noted. Report from previous nurse stated that patient may have difficulty swallowing. Advised to crush meds with applesauce. Skin intact. Left hand missing two middle fingers. Currently resting in a low, locked bed. Call light within reach.

## 2020-07-04 NOTE — PROGRESS NOTES
Precaution policy for  visits:    Reviewed notes for spiritual concerns      Will continue to follow during this admission        Brittany Arredondo, staff

## 2020-07-04 NOTE — PROGRESS NOTES
TRANSFER - OUT REPORT:    Verbal report given Oniel Robins on [address]  being transferred to (8th fl, Rm 810) for routine progression of care and Covid-19 rule out. Report consisted of patients Situation, Background, Assessment and   Recommendations(SBAR). Information from the following report(s) SBAR, Kardex, ED Summary, OR Summary and Procedure Summary was reviewed with the receiving nurse. Lines:   Peripheral IV 07/03/20 Left Antecubital (Active)   Site Assessment Clean, dry, & intact 7/4/2020  1:40 AM   Phlebitis Assessment 0 7/4/2020  1:40 AM   Infiltration Assessment 0 7/4/2020  1:40 AM   Dressing Status Clean, dry, & intact 7/4/2020  1:40 AM   Dressing Type Transparent;Tape 7/4/2020  1:40 AM   Hub Color/Line Status Infusing 7/4/2020  1:40 AM        Opportunity for questions and clarification was provided. Patient transported via hospital transport with hearing aids and cell phone.

## 2020-07-04 NOTE — PROGRESS NOTES
TRANSFER - IN REPORT:    Verbal report received from Ezequiel Talley RN(name) on [facility]  being received from ED(unit) for routine progression of care      Report consisted of patients Situation, Background, Assessment and   Recommendations(SBAR). Information from the following report(s) SBAR, ED Summary, Intake/Output, MAR and Recent Results was reviewed with the receiving nurse. Opportunity for questions and clarification was provided. Assessment to be completed upon patients arrival to unit and care assumed.

## 2020-07-04 NOTE — PROGRESS NOTES
STG: Patient will tolerate puree diet with thin liquids without clinical s/sx of aspiration or airway compromise. STG: Patient will tolerate diet upgrade trials without clinical s/sx of aspiration or airway compromise. LTG: Patient will tolerate least restrictive diet without overt clinical s/sx of aspiration or airway compromise. SPEECH LANGUAGE PATHOLOGY: DYSPHAGIA- Initial Assessment    NAME/AGE/GENDER: Dajuan Brantley is a 82 y.o. male  DATE: 7/4/2020  PRIMARY DIAGNOSIS: [facility] with [facility] [J96.01]       ICD-10: Treatment Diagnosis: R13.14 Dysphagia, Pharyngoesophageal Phase    RECOMMENDATIONS   DIET:   PO:  Pureed  Liquids:  regular thin    MEDICATIONS: Crushed in puree     ASPIRATION PRECAUTIONS  Slow rate of intake  Small bites/sips  Upright at 90 degrees during meal     COMPENSATORY STRATEGIES/MODIFICATIONS  Small sips and bites     EDUCATION:  Recommendations discussed with Nursing  Patient     CONTINUATION OF SKILLED SERVICES/MEDICAL NECESSITY:  Patient is expected to demonstrate progress in  swallow strength, swallow timeliness, swallow function, diet tolerance, and swallow safety in order to  improve swallow safety and work toward diet advancement. Patient continues to require skilled intervention due to clinical s/sx of Dysphagia. RECOMMENDATIONS for CONTINUED SPEECH THERAPY:   YES: Anticipate need for ongoing speech therapy during this hospitalization. ASSESSMENT   Patient presents without clinical s/sx of Dysphagia however per RN(Kathryn) patient was having difficulty taking his medications this am. She stated that he gagged and couple of times and brought the pill back up. She reports that patient had back surgery about 5 weeks ago and started having difficulty swallowing. He is agreeable to p.o trials. He states that he doesn't have much of an appetite. Could not give me much history and he is [place]. OME was WNL's.  He was given trials of thin liquids via cup/straw, puree and mixed. He declined solids stating \"that is enough. \" No overt clinical s/sx of aspiration was observed with any trials. Recommend puree diet with thin liquids. Medications crushed in applesauce  REHABILITATION POTENTIAL FOR STATED GOALS: Fair    PLAN    FREQUENCY/DURATION: Continue to follow patient 2 times a week for duration of hospital stay to address above goals. - Recommendations for next treatment session: Next treatment will address diet tolerance     SUBJECTIVE   Alert  History of Present Injury/Illness: Mr. Joao Anderson  has a past medical history of BPH (benign prostatic hyperplasia), CAD (coronary artery disease) (2016), Chronic pain, GERD (gastroesophageal reflux disease), Hypertension, Kidney stone, Spinal stenosis, and Thyroid disease. He also has no past medical history of Difficult intubation, Malignant hyperthermia due to anesthesia, Nausea & vomiting, or Pseudocholinesterase deficiency. Maribel Dyson He also  has a past surgical history that includes hx appendectomy (1957); hx heart catheterization (,2016, 2017); neurological procedure unlisted (2001); hx lumbar fusion (2002); hx lumbar fusion (2006); hx lumbar fusion; hx cataract removal (Bilateral); and hx colonoscopy. Problem List:  (Impairments causing functional limitations):  Dysphagia    Previous Dysphagia: Patient reports that he started having difficulty about 5 weeks ago  Diet Prior to Evaluation:     Orientation:   Person    Pain: Pain Scale 1: Numeric (0 - 10)  Pain Intensity 1: 7  Pain Location 1: Back  Pain Intervention(s) 1: Medication (see MAR)    Cognitive-Linguistic Screen:  Speech Production:   WNL  Expressive Language: WNL    Receptive Language:   WNL  Followed commands  Cognition:   Impaired  [place]    OBJECTIVE   Oral Motor:   Labial: No impairment  Dentition: Natural  Oral Hygiene: Dry and     Lingual: No impairment    Swallow evaluation:   Patient consumed trials of thin liquids via cup/straw, puree and mixed consistencies    INTERDISCIPLINARY COLLABORATION: Speech Therapist and Registered Nurse  PRECAUTIONS/ALLERGIES: Lipitor [atorvastatin] and Plavix [clopidogrel]     Tool Used: Dysphagia Outcome and Severity Scale (LULÚ)    Score Comments   Normal Diet  [] 7 With no strategies or extra time needed   Functional Swallow  [] 6 May have mild oral or pharyngeal delay   Mild Dysphagia  [] 5 Which may require one diet consistency restricted    Mild-Moderate Dysphagia  [] 4 With 1-2 diet consistencies restricted   Moderate Dysphagia  [] 3 With 2 or more diet consistencies restricted   Moderate-Severe Dysphagia  [] 2 With partial PO strategies (trials with ST only)   Severe Dysphagia  [] 1 With inability to tolerate any PO safely      Score:  Initial: 4 Most Recent:  (Date 07/04/20 )   Interpretation of Tool: The Dysphagia Outcome and Severity Scale (LULÚ) is a simple, easy-to-use, 7-point scale developed to systematically rate the functional severity of dysphagia based on objective assessment and make recommendations for diet level, independence level, and type of nutrition. Current Medications:   No current facility-administered medications on file prior to encounter. Current Outpatient Medications on File Prior to Encounter   Medication Sig Dispense Refill    HYDROcodone-acetaminophen (Norco) 5-325 mg per tablet Take 1 Tab by mouth every six (6) hours as needed for Pain. Per patient every 6 hours medication is taken      docusate sodium (COLACE) 100 mg capsule Take 200 mg by mouth daily. gabapentin (NEURONTIN) 300 mg capsule Take 300 mg by mouth three (3) times daily. esomeprazole (NexIUM) 20 mg capsule Take 20 mg by mouth daily. traMADoL (ULTRAM) 50 mg tablet Take 50 mg by mouth every six (6) hours as needed for Pain. lisinopriL (PRINIVIL, ZESTRIL) 5 mg tablet Take 5 mg by mouth daily. aspirin 81 mg chewable tablet Take 81 mg by mouth daily.       doxazosin (CARDURA) 2 mg tablet Take 2 mg by mouth daily. levothyroxine (LEVOTHROID) 100 mcg tablet Take 100 mcg by mouth Daily (before breakfast). polyethylene glycol (MIRALAX) 17 gram packet Take 17 g by mouth daily as needed for Constipation. mix with 6 oz of fluids         After treatment position/precautions:  Upright in bed    Total Treatment Duration:   Time In: 1330  Time Out: [facility], Dwane Lennox. Karrie Bird

## 2020-07-04 NOTE — PROGRESS NOTES
Patient states he takes 5mg of Norco Q6hrs for chronic pain. Patient resting at this time. Please notify DR to place order. Thanks      1127 - Patient c/o pain despite tramadol. Notified hospitalist. New orders received.

## 2020-07-04 NOTE — ED NOTES
TRANSFER - OUT REPORT:    Verbal report given to Kevin(name) on Alexei Andrew  being transferred to [facility](unit) for routine progression of care       Report consisted of patients Situation, Background, Assessment and   Recommendations(SBAR). Information from the following report(s) SBAR was reviewed with the receiving nurse. Lines:   Peripheral IV 07/03/20 Left Antecubital (Active)   Site Assessment Clean, dry, & intact 7/3/2020 11:28 AM   Phlebitis Assessment 0 7/3/2020 11:28 AM   Infiltration Assessment 0 7/3/2020 11:28 AM   Dressing Status Clean, dry, & intact 7/3/2020 11:28 AM        Opportunity for questions and clarification was provided.       Patient transported with:   O2 @ 2 liters

## 2020-07-04 NOTE — H&P
HOSPITALIST INITIAL HISTORY AND PHYSICAL    NAME:  Yaneli Donovan   Age:  82 y.o.  :   1935   MRN:   [id number]  PCP: Jordyn Mallory MD  Consulting MD:  Treatment Team: Attending Provider: Lex Hashimoto, MD; Primary Nurse: Lupe Reeder    CHIEF COMPLAINT: fever    HISTORY OF PRESENT ILLNESS:   Denise Saunders is an 82 y.o. male with a past medical history of CAD, HTN, hypthyroidism who presents to the ER with report of chills for the the past week or so. He also has had difficulty swallowing and eating per his wife's report. Denies any SOB, cough, or pain. REVIEW OF SYSTEMS: Comprehensive ROS performed. Denies nausea, vomiting, admits to fevers, chills, malaise, weakness, otherwise negative. Past Medical History:   Diagnosis Date    BPH (benign prostatic hyperplasia)     CAD (coronary artery disease)     6 total stents, MI    Chronic pain     back pain    GERD (gastroesophageal reflux disease)     controlled with otc meds    Hypertension     Kidney stone     Spinal stenosis     Thyroid disease     hypothyroidism        Past Surgical History:   Procedure Laterality Date    HX APPENDECTOMY      HX CATARACT REMOVAL Bilateral     IOL    HX COLONOSCOPY      HX HEART CATHETERIZATION  ,, 2017    x3 LAD in 2017, x3 RCA in 2016    HX LUMBAR FUSION  2002    fusion lumbar- 2-3    HX LUMBAR FUSION  2006    fusion lumbar 3-4    HX LUMBAR FUSION      L5-S1 fusion    NEUROLOGICAL PROCEDURE UNLISTED      discectomy        Prior to Admission Medications   Prescriptions Last Dose Informant Patient Reported? Taking?   aspirin 81 mg chewable tablet   Yes No   Sig: Take 81 mg by mouth daily. docusate sodium (COLACE) 100 mg capsule   Yes No   Sig: Take 200 mg by mouth daily. doxazosin (CARDURA) 2 mg tablet   Yes No   Sig: Take 2 mg by mouth daily. esomeprazole (NexIUM) 20 mg capsule   Yes No   Sig: Take 20 mg by mouth daily.    gabapentin (NEURONTIN) 300 mg capsule   Yes No   Sig: Take 300 mg by mouth three (3) times daily. levothyroxine (LEVOTHROID) 100 mcg tablet   Yes No   Sig: Take 100 mcg by mouth Daily (before breakfast). lisinopriL (PRINIVIL, ZESTRIL) 5 mg tablet   Yes No   Sig: Take 5 mg by mouth daily. polyethylene glycol (MIRALAX) 17 gram packet   Yes No   Sig: Take 17 g by mouth daily as needed for Constipation. mix with 6 oz of fluids   traMADoL (ULTRAM) 50 mg tablet   Yes No   Sig: Take 50 mg by mouth every six (6) hours as needed for Pain. Facility-Administered Medications: None       Allergies   Allergen Reactions    Lipitor [Atorvastatin] Myalgia    Plavix [Clopidogrel] Nausea Only     Change of taste with food       FAMILY HISTORY: Reviewed. Negative except   Family History   Problem Relation Age of Onset    Heart Disease Father     Heart Disease Brother     Stone Brother         Kidney stone    Cancer Son        Social History     Tobacco Use    Smoking status: Former Smoker     Packs/day: 1.00     Years: 5.00     Pack years: 5.00     Last attempt to quit: 1965     Years since quittin.5    Smokeless tobacco: Never Used   Substance Use Topics    Alcohol use: No         Objective:     Visit Vitals  /69   Pulse 97   Temp (!) 103.4 °F (39.7 °C)   Resp 20   SpO2 95%      Temp (24hrs), Av.9 °F (37.7 °C), Min:98 °F (36.7 °C), Max:103.4 °F (39.7 °C)    Oxygen Therapy  O2 Sat (%): 95 % (20)  Pulse via Oximetry: 97 beats per minute (20 2331)  O2 Device: Nasal cannula (20)  O2 Flow Rate (L/min): 2 l/min (20 2340)  Physical Exam:  General:    The patient is a pleasant elderly male in no acute distress. Head:   Normocephalic/atraumatic. Eyes:  No palpebral pallor or scleral icterus. ENT:  External auricular and nasal exam within normal limits. Lungs:   Clear to auscultation bilaterally without wheezes or crackles.   Abdomen:   non-distended  Extremities: Without clubbing, cyanosis, or edema.  Skin:     Normal color, texture, and turgor. No rashes, lesions, or jaundice. Neurologic: CN II-XII grossly intact and symmetrical.   Psychiatric: Pleasant demeanor, appropriate affect. Alert    Data Review:   Recent Results (from the past 24 hour(s))   CBC WITH AUTOMATED DIFF    Collection Time: 07/03/20 11:29 AM   Result Value Ref Range    WBC 5.5 4.3 - 11.1 K/uL    RBC 4.04 (L) 4.23 - 5.6 M/uL    HGB 11.8 (L) 13.6 - 17.2 g/dL    HCT 37.3 (L) 41.1 - 50.3 %    MCV 92.3 79.6 - 97.8 FL    MCH 29.2 26.1 - 32.9 PG    MCHC 31.6 31.4 - 35.0 g/dL    RDW 13.6 11.9 - 14.6 %    PLATELET 332 478 - 056 K/uL    MPV 8.8 (L) 9.4 - 12.3 FL    ABSOLUTE NRBC 0.02 0.0 - 0.2 K/uL    DF AUTOMATED      NEUTROPHILS 75 43 - 78 %    LYMPHOCYTES 18 13 - 44 %    MONOCYTES 6 4.0 - 12.0 %    EOSINOPHILS 0 (L) 0.5 - 7.8 %    BASOPHILS 0 0.0 - 2.0 %    IMMATURE GRANULOCYTES 1 0.0 - 5.0 %    ABS. NEUTROPHILS 4.1 1.7 - 8.2 K/UL    ABS. LYMPHOCYTES 1.0 0.5 - 4.6 K/UL    ABS. MONOCYTES 0.3 0.1 - 1.3 K/UL    ABS. EOSINOPHILS 0.0 0.0 - 0.8 K/UL    ABS. BASOPHILS 0.0 0.0 - 0.2 K/UL    ABS. IMM. GRANS. 0.0 0.0 - 0.5 K/UL   METABOLIC PANEL, COMPREHENSIVE    Collection Time: 07/03/20 11:29 AM   Result Value Ref Range    Sodium 137 136 - 145 mmol/L    Potassium 3.8 3.5 - 5.1 mmol/L    Chloride 103 98 - 107 mmol/L    CO2 25 21 - 32 mmol/L    Anion gap 9 7 - 16 mmol/L    Glucose 120 (H) 65 - 100 mg/dL    BUN 11 8 - 23 MG/DL    Creatinine 0.88 0.8 - 1.5 MG/DL    GFR est AA >60 >60 ml/min/1.73m2    GFR est non-AA >60 >60 ml/min/1.73m2    Calcium 8.1 (L) 8.3 - 10.4 MG/DL    Bilirubin, total 0.5 0.2 - 1.1 MG/DL    ALT (SGPT) 17 12 - 65 U/L    AST (SGOT) 34 15 - 37 U/L    Alk.  phosphatase 88 50 - 136 U/L    Protein, total 7.0 6.3 - 8.2 g/dL    Albumin 3.4 3.2 - 4.6 g/dL    Globulin 3.6 (H) 2.3 - 3.5 g/dL    A-G Ratio 0.9 (L) 1.2 - 3.5     PROCALCITONIN    Collection Time: 07/03/20 11:29 AM   Result Value Ref Range    Procalcitonin <0.05 ng/mL   LACTIC ACID    Collection Time: 07/03/20  2:54 PM   Result Value Ref Range    Lactic acid 1.3 0.4 - 2.0 MMOL/L   URINALYSIS W/ RFLX MICROSCOPIC    Collection Time: 07/03/20  3:18 PM   Result Value Ref Range    Color KOSTAS      Appearance CLEAR      Specific gravity 1.025 (H) 1.001 - 1.023      pH (UA) 6.0 5.0 - 9.0      Protein 100 (A) NEG mg/dL    Glucose Negative mg/dL    Ketone 15 (A) NEG mg/dL    Bilirubin Negative NEG      Blood Negative NEG      Urobilinogen 0.2 0.2 - 1.0 EU/dL    Nitrites Negative NEG      Leukocyte Esterase Negative NEG      WBC 0-3 0 /hpf    RBC 0-3 0 /hpf    Epithelial cells 5-10 0 /hpf    Bacteria 0 0 /hpf    Casts 0-3 0 /lpf       Imaging Angelita Hardee /Studies:  Xr Abd Acute W 1 V Chest    Result Date: 7/3/2020  Impression: No acute pathology identified. Assessment and Plan:     Principal Problem:    Acute respiratory failure with hypoxia ([address] (7/3/2020)    Wean O2 as tolerated. CXR unremarkable. Active Problems:    Fever (7/3/2020)    COVID test pending. Suspected COVID-19 virus infection (7/3/2020)    COVID pending. GERD (gastroesophageal reflux disease) ()    Stable. Continue home meds. DVT Prophylaxis: Lovenox      Code Status: FULL CODE      Disposition: Admit to COVID unit for evaluation and treatment as per above.       Anticipated discharge: 2-3 days     Signed By: Jeanette Welch MD     July 3, 2020

## 2020-07-04 NOTE — PROGRESS NOTES
Nutrition Assessment for:   Best Practice Alert for Malnutrition Screening Tool: Recently Lost Weight Without Trying: Yes, If Yes, How Much Weight Loss: 2-13 lbs, Eating Poorly Due to Decreased Appetite: Yes    Assessment: Admitted with acute respiratory failure with hypoxia, fever, suspected Covid. PMH remarkable for CAD, HTN, hypothyroidism. Spinal surgery  5/29/20 per EMR review. Pt contacted via phone, he reports difficulties swallowing for several days. He indicates he doesn't get choked but is \"simple not able to swallow. \"  RN reports this am it took multiple attempts with his medications before he was able to swallow. Pt provides a vague history regarding quantity and quality of po intake. He states his UBW is \"180# and I don't think I way that now. \"    DIET REGULAR    Nursing reports no intake this am.    Anthropometrics:   72\",   81.6 kg (180 lb)  as of 7/3/2020, no source identified  , There is no height or weight on file to calculate BMI. BMI class of Normal weight based on story board 24.41 kg/m²   6' (182.9 cm)  as of 7/3/2020   Wt hx per EMR review below  WT / BMI 5/29/2020 3/18/2020   WEIGHT 186 lb 2 oz 186 lb 7 oz   Unable to determine if pt has experienced weight loss at this time as his reported UBW is less than minimal EMR records available and current weight is without a source. Macronutrient needs: (using Admission weight 82 kg)  EER: 5325-8424 kcal/day (20-25 kcal/kg)  EPR:  g/day (1-1.25 g/kg)    utrition Diagnosis:  Predicted inadequate oral intake related to swallowing difficulty  as evidenced by pt reports inability to swallow foods for several days with potential wt loss indicated. Nutrition Intervention:  Meals and snacks: Continue current diet, offer only if pt able to swallow. Medical food supplement therapy: Ensure Enlive TID.   Coordination of nutrition care by a Nutrition Professional: Discussed with Dr. Sepideh Mejia via [address] [place] [address], nutrition protocol activated, SLP consulted. Discussed with Kendall Neri RN. Discharge Nutrition Plan: Too soon to determine.     Maryneal [place], [address], [address]

## 2020-07-04 NOTE — PROGRESS NOTES
07/04/20 1803   Dual Skin Pressure Injury Assessment   Dual Skin Pressure Injury Assessment WDL   Second Care Provider (Based on Facility Policy) Eric gonzalez RN   Skin Integumentary   Skin Integumentary (WDL) WDL    Pressure  Injury Documentation No Pressure Injury Noted-Pressure Ulcer Prevention Initiated   Skin Color Appropriate for ethnicity   Skin Condition/Temp Warm;Dry   Skin Integrity Intact   Turgor Epidermis thin w/ loss of subcut tissue   Left hand missing two middle fingers

## 2020-07-04 NOTE — PROGRESS NOTES
END OF SHIFT NOTE:    INTAKE/OUTPUT   1900 -  0700  IVF: 813.76 ml  VOIDIN ml    Flatus: Patient does not have flatus present, per patient    Stool:  0 occurrences. Emesis: 0 occurrences. VITAL SIGNS  Patient Vitals for the past 12 hrs:   Temp Pulse Resp BP SpO2   20 0342 98 °F (36.7 °C) 96 20 131/77 96 %   20 0147 98.4 °F (36.9 °C) 96 22 120/74 95 %   20 0122    110/63    20 0116     (!) 81 %   20 0102    132/84 91 %   20 0053  96   94 %   20 0043    132/79    20 0022  96  116/70 90 %   20 0002 100.1 °F (37.8 °C) 99 18 109/59 92 %   20 2343  75  127/72 95 %   20 2340     95 %   20 2333     (!) 88 %   20 2331  97   91 %   20 2330  (!) 102   (!) 88 %   20 2300 (!) 103.4 °F (39.7 °C)       20 2222    119/69    20 2202    155/81    20 2143    151/67    20 2122    141/77    20 2114  (!) 106   93 %   20  (!) 103  163/77 92 %   20  (!) 109  140/90 92 %     Pain Assessment  Pain Intensity 1: 7 (20 0502)  Pain Location 1: Back  Pain Intervention(s) 1: Medication (see MAR)  Patient Stated Pain Goal: 3    Ambulating  No    Shift report to be given to oncoming nurse at the bedside.     [address] [facility]

## 2020-07-05 ENCOUNTER — APPOINTMENT (OUTPATIENT)
Dept: GENERAL RADIOLOGY | Age: 85
DRG: 871 | End: 2020-07-05
Attending: FAMILY MEDICINE
Payer: MEDICARE

## 2020-07-05 LAB
ANION GAP SERPL CALC-SCNC: 6 MMOL/L (ref 7–16)
BASOPHILS # BLD: 0 K/UL (ref 0–0.2)
BASOPHILS NFR BLD: 0 % (ref 0–2)
BUN SERPL-MCNC: 11 MG/DL (ref 8–23)
CALCIUM SERPL-MCNC: 7.6 MG/DL (ref 8.3–10.4)
CHLORIDE SERPL-SCNC: 107 MMOL/L (ref 98–107)
CO2 SERPL-SCNC: 25 MMOL/L (ref 21–32)
COVID-19 RAPID TEST, COVR: DETECTED
CREAT SERPL-MCNC: 0.72 MG/DL (ref 0.8–1.5)
CRP SERPL HS-MCNC: 82.7 MG/L
DIFFERENTIAL METHOD BLD: ABNORMAL
EOSINOPHIL # BLD: 0 K/UL (ref 0–0.8)
EOSINOPHIL NFR BLD: 0 % (ref 0.5–7.8)
ERYTHROCYTE [DISTWIDTH] IN BLOOD BY AUTOMATED COUNT: 13.8 % (ref 11.9–14.6)
GLUCOSE SERPL-MCNC: 108 MG/DL (ref 65–100)
HCT VFR BLD AUTO: 29.7 % (ref 41.1–50.3)
HGB BLD-MCNC: 10.1 G/DL (ref 13.6–17.2)
IMM GRANULOCYTES # BLD AUTO: 0 K/UL (ref 0–0.5)
IMM GRANULOCYTES NFR BLD AUTO: 1 % (ref 0–5)
LYMPHOCYTES # BLD: 1.3 K/UL (ref 0.5–4.6)
LYMPHOCYTES NFR BLD: 17 % (ref 13–44)
MCH RBC QN AUTO: 30.7 PG (ref 26.1–32.9)
MCHC RBC AUTO-ENTMCNC: 34 G/DL (ref 31.4–35)
MCV RBC AUTO: 90.3 FL (ref 79.6–97.8)
MONOCYTES # BLD: 0.3 K/UL (ref 0.1–1.3)
MONOCYTES NFR BLD: 4 % (ref 4–12)
NEUTS SEG # BLD: 5.7 K/UL (ref 1.7–8.2)
NEUTS SEG NFR BLD: 78 % (ref 43–78)
NRBC # BLD: 0 K/UL (ref 0–0.2)
PLATELET # BLD AUTO: 160 K/UL (ref 150–450)
PMV BLD AUTO: 10 FL (ref 9.4–12.3)
POTASSIUM SERPL-SCNC: 3.5 MMOL/L (ref 3.5–5.1)
RBC # BLD AUTO: 3.29 M/UL (ref 4.23–5.6)
SODIUM SERPL-SCNC: 138 MMOL/L (ref 136–145)
SOURCE, COVRS: ABNORMAL
WBC # BLD AUTO: 7.4 K/UL (ref 4.3–11.1)

## 2020-07-05 PROCEDURE — 74011250636 HC RX REV CODE- 250/636: Performed by: FAMILY MEDICINE

## 2020-07-05 PROCEDURE — 80048 BASIC METABOLIC PNL TOTAL CA: CPT

## 2020-07-05 PROCEDURE — 77010033678 HC OXYGEN DAILY

## 2020-07-05 PROCEDURE — 94760 N-INVAS EAR/PLS OXIMETRY 1: CPT

## 2020-07-05 PROCEDURE — 86141 C-REACTIVE PROTEIN HS: CPT

## 2020-07-05 PROCEDURE — 71045 X-RAY EXAM CHEST 1 VIEW: CPT

## 2020-07-05 PROCEDURE — 74011250637 HC RX REV CODE- 250/637: Performed by: FAMILY MEDICINE

## 2020-07-05 PROCEDURE — 85025 COMPLETE CBC W/AUTO DIFF WBC: CPT

## 2020-07-05 PROCEDURE — 74011000258 HC RX REV CODE- 258: Performed by: FAMILY MEDICINE

## 2020-07-05 PROCEDURE — 65270000029 HC RM PRIVATE

## 2020-07-05 RX ORDER — DEXAMETHASONE 4 MG/1
6 TABLET ORAL DAILY
Status: DISCONTINUED | OUTPATIENT
Start: 2020-07-05 | End: 2020-07-10 | Stop reason: HOSPADM

## 2020-07-05 RX ORDER — SODIUM CHLORIDE 9 MG/ML
250 INJECTION, SOLUTION INTRAVENOUS AS NEEDED
Status: DISCONTINUED | OUTPATIENT
Start: 2020-07-05 | End: 2020-07-10 | Stop reason: HOSPADM

## 2020-07-05 RX ADMIN — ASPIRIN 81 MG: 81 TABLET, CHEWABLE ORAL at 09:47

## 2020-07-05 RX ADMIN — PIPERACILLIN AND TAZOBACTAM 3.38 G: 3; .375 INJECTION, POWDER, FOR SOLUTION INTRAVENOUS at 04:33

## 2020-07-05 RX ADMIN — Medication 10 ML: at 14:00

## 2020-07-05 RX ADMIN — Medication 10 ML: at 23:39

## 2020-07-05 RX ADMIN — GABAPENTIN 300 MG: 300 CAPSULE ORAL at 09:50

## 2020-07-05 RX ADMIN — DEXAMETHASONE 6 MG: 4 TABLET ORAL at 11:00

## 2020-07-05 RX ADMIN — PIPERACILLIN AND TAZOBACTAM 3.38 G: 3; .375 INJECTION, POWDER, FOR SOLUTION INTRAVENOUS at 11:00

## 2020-07-05 RX ADMIN — GABAPENTIN 300 MG: 300 CAPSULE ORAL at 17:51

## 2020-07-05 RX ADMIN — DOCUSATE SODIUM 200 MG: 100 CAPSULE, LIQUID FILLED ORAL at 09:46

## 2020-07-05 RX ADMIN — ENOXAPARIN SODIUM 40 MG: 40 INJECTION SUBCUTANEOUS at 09:47

## 2020-07-05 RX ADMIN — SODIUM CHLORIDE 100 ML/HR: 9 INJECTION, SOLUTION INTRAVENOUS at 07:58

## 2020-07-05 RX ADMIN — VANCOMYCIN HYDROCHLORIDE 1000 MG: 1 INJECTION, POWDER, LYOPHILIZED, FOR SOLUTION INTRAVENOUS at 09:48

## 2020-07-05 RX ADMIN — PANTOPRAZOLE SODIUM 40 MG: 40 TABLET, DELAYED RELEASE ORAL at 06:18

## 2020-07-05 RX ADMIN — LISINOPRIL 5 MG: 5 TABLET ORAL at 09:47

## 2020-07-05 RX ADMIN — LEVOTHYROXINE SODIUM 100 MCG: 0.1 TABLET ORAL at 06:18

## 2020-07-05 RX ADMIN — GABAPENTIN 300 MG: 300 CAPSULE ORAL at 20:56

## 2020-07-05 RX ADMIN — DOXAZOSIN 2 MG: 1 TABLET ORAL at 09:46

## 2020-07-05 NOTE — PROGRESS NOTES
Resting quietly with no c/o. Tylenol 650 mg given PO for temp 101. Encouraged pt to increase PO fluid intake, rationale given.

## 2020-07-05 NOTE — PROGRESS NOTES
Hospitalist Progress Note     Admit Date:  7/3/2020  1:42 PM   Name:  Carmita Donovan   Age:  82 y.o.  :  1935   MRN:  [id number]   PCP:  Naz Simmons MD  Treatment Team: Attending Provider: Arsen Mcgarry MD; Consulting Provider: Bart Prasad NP; Utilization Review: Radha Rockwell RN    Subjective:   Nara Neri is an 82 y.o. male with a past medical history of CAD, HTN, hypthyroidism who presents to the ER with report of chills for the the past week or so. He also has had difficulty swallowing and eating per his wife's report. Denies any SOB, cough, or pain. Patient found to have novel coronavirus infected pneumonia with hypoxia. Today: Continue dexamethasone. Patient consented for convalescent plasma. Consult infectious disease remdesivir. Objective:     Patient Vitals for the past 24 hrs:   Temp Pulse Resp BP SpO2   20 1430 97.7 °F (36.5 °C) (!) 57 16 111/61 96 %   20 1220 98.8 °F (37.1 °C) 72 20 127/58 98 %   20 0955 (!) 100.6 °F (38.1 °C) (!) 102 18 136/70 96 %   20 0311 99.1 °F (37.3 °C) 94 20 118/66 97 %   20 2306 (!) 101 °F (38.3 °C) 94 20 126/67 97 %   20 2029 99.1 °F (37.3 °C) 78 19 142/83 97 %   20 1727 99 °F (37.2 °C) 76 16 102/62 96 %     Oxygen Therapy  O2 Sat (%): 96 % (20 1430)  Pulse via Oximetry: 92 beats per minute (20 0116)  O2 Device: Nasal cannula (20)  O2 Flow Rate (L/min): 2 l/min (20)    Intake/Output Summary (Last 24 hours) at 2020 1505  Last data filed at 2020 2744  Gross per 24 hour   Intake 1288 ml   Output 500 ml   Net 788 ml         General:    Well nourished. Alert. On room air, normal sat  heent- normal  CV:   RRR. No murmur, rub, or gallop. Lungs:   Nonlabored respirations on 3 LPM via NC, no wheezing  Abdomen:   Soft, nontender, nondistended. Cns - no focal neurological deficits   Extremities: Warm and dry. No cyanosis or edema. Skin:     No rashes or jaundice. Thorasic spine region, fluctuant mass/fluid collection, no significant tenderness, no erythema. Data Review:  I have reviewed all labs, meds, telemetry events, and studies from the last 24 hours. Recent Results (from the past 24 hour(s))   METABOLIC PANEL, BASIC    Collection Time: 07/05/20  4:47 AM   Result Value Ref Range    Sodium 138 136 - 145 mmol/L    Potassium 3.5 3.5 - 5.1 mmol/L    Chloride 107 98 - 107 mmol/L    CO2 25 21 - 32 mmol/L    Anion gap 6 (L) 7 - 16 mmol/L    Glucose 108 (H) 65 - 100 mg/dL    BUN 11 8 - 23 MG/DL    Creatinine 0.72 (L) 0.8 - 1.5 MG/DL    GFR est AA >60 >60 ml/min/1.73m2    GFR est non-AA >60 >60 ml/min/1.73m2    Calcium 7.6 (L) 8.3 - 10.4 MG/DL   CBC WITH AUTOMATED DIFF    Collection Time: 07/05/20  4:47 AM   Result Value Ref Range    WBC 7.4 4.3 - 11.1 K/uL    RBC 3.29 (L) 4.23 - 5.6 M/uL    HGB 10.1 (L) 13.6 - 17.2 g/dL    HCT 29.7 (L) 41.1 - 50.3 %    MCV 90.3 79.6 - 97.8 FL    MCH 30.7 26.1 - 32.9 PG    MCHC 34.0 31.4 - 35.0 g/dL    RDW 13.8 11.9 - 14.6 %    PLATELET 173 660 - 476 K/uL    MPV 10.0 9.4 - 12.3 FL    ABSOLUTE NRBC 0.00 0.0 - 0.2 K/uL    DF AUTOMATED      NEUTROPHILS 78 43 - 78 %    LYMPHOCYTES 17 13 - 44 %    MONOCYTES 4 4.0 - 12.0 %    EOSINOPHILS 0 (L) 0.5 - 7.8 %    BASOPHILS 0 0.0 - 2.0 %    IMMATURE GRANULOCYTES 1 0.0 - 5.0 %    ABS. NEUTROPHILS 5.7 1.7 - 8.2 K/UL    ABS. LYMPHOCYTES 1.3 0.5 - 4.6 K/UL    ABS. MONOCYTES 0.3 0.1 - 1.3 K/UL    ABS. EOSINOPHILS 0.0 0.0 - 0.8 K/UL    ABS. BASOPHILS 0.0 0.0 - 0.2 K/UL    ABS. IMM.  GRANS. 0.0 0.0 - 0.5 K/UL   CRP, HIGH SENSITIVITY    Collection Time: 07/05/20  4:47 AM   Result Value Ref Range    CRP, High sensitivity 82.7 mg/L        All Micro Results     None          Current Meds:  Current Facility-Administered Medications   Medication Dose Route Frequency    dexAMETHasone (DECADRON) tablet 6 mg  6 mg Oral DAILY    0.9% sodium chloride infusion 250 mL  250 mL IntraVENous PRN    aspirin chewable tablet 81 mg  81 mg Oral DAILY    docusate sodium (COLACE) capsule 200 mg  200 mg Oral DAILY    doxazosin (CARDURA) tablet 2 mg  2 mg Oral DAILY    pantoprazole (PROTONIX) tablet 40 mg  40 mg Oral ACB    gabapentin (NEURONTIN) capsule 300 mg  300 mg Oral TID    levothyroxine (SYNTHROID) tablet 100 mcg  100 mcg Oral ACB    lisinopriL (PRINIVIL, ZESTRIL) tablet 5 mg  5 mg Oral DAILY    traMADoL (ULTRAM) tablet 50 mg  50 mg Oral Q6H PRN    sodium chloride (NS) flush 5-40 mL  5-40 mL IntraVENous Q8H    sodium chloride (NS) flush 5-40 mL  5-40 mL IntraVENous PRN    acetaminophen (TYLENOL) tablet 650 mg  650 mg Oral Q4H PRN    ondansetron (ZOFRAN) injection 4 mg  4 mg IntraVENous Q4H PRN    magnesium hydroxide (MILK OF MAGNESIA) 400 mg/5 mL oral suspension 30 mL  30 mL Oral DAILY PRN    0.9% sodium chloride infusion  100 mL/hr IntraVENous CONTINUOUS    enoxaparin (LOVENOX) injection 40 mg  40 mg SubCUTAneous Q24H    HYDROcodone-acetaminophen (NORCO) 5-325 mg per tablet 1 Tab  1 Tab Oral Q6H PRN       Other Studies (last 24 hours):  Xr Chest Sngl V    Result Date: 7/5/2020  CHEST X-RAY, one view. HISTORY:  COVID positive. Shortness of breath. TECHNIQUE:  AP portable semiupright view COMPARISON: Exam 2 days prior. FINDINGS:   -The lungs: Faint left basilar infiltrate suspected. Right lung is clear. -The costophrenic angles: are sharp. -The heart size: is normal. -The pulmonary vasculature: is unremarkable. -Included portion of the upper abdomen: is unremarkable. -Bones: Thoracolumbar fusion hardware -Other: None. IMPRESSION:  Faint left basilar infiltrate suspected.        Assessment and Plan:     Hospital Problems as of 7/5/2020 Date Reviewed: 5/22/2015          Codes Class Noted - Resolved POA    Essential hypertension ICD-10-CM: I10  ICD-9-CM: 401.9  7/4/2020 - Present Unknown        COVID-19 virus infection ICD-10-CM: U07.1  ICD-9-CM: 079.89  7/4/2020 - Present Unknown        * (Principal) Acute respiratory failure with hypoxia (HCC) ICD-10-CM: J96.01  ICD-9-CM: 518.81  7/3/2020 - Present Yes        Fever ICD-10-CM: R50.9  ICD-9-CM: 780.60  7/3/2020 - Present Yes        Suspected COVID-19 virus infection ICD-10-CM: Z20.828  ICD-9-CM: V01.79  7/3/2020 - Present Yes        GERD (gastroesophageal reflux disease) ICD-10-CM: K21.9  ICD-9-CM: 530.81  Unknown - Present Yes    Overview Signed 2012  9:53 AM by Mertie Apgar, MD     controlled with otc meds             Thyroid disease ICD-10-CM: E07.9  ICD-9-CM: 246. 9  Unknown - Present Yes    Overview Signed 2012  9:53 AM by Mertie Apgar, MD     hypothyroidism                   PLAN:      #Novel coronavirus infected pneumonia, acute hypoxic respiratory failure:  -Continue dexamethasone  -Consult infectious disease for remdesivir  -Consented for convalescent plasma, likely will be transfused on 2020  -Supplemental oxygen, wean as able  -Daily labs    #BPH: Continue doxazosin  #Hypothyroidism: Continue levothyroxine  #HTN: Continue lisinopril  #GERD: Continue pantoprazole  #HLD: Continue aspirin    DC planning/Dispo:    DVT ppx:  lovenox    Signed:  Lucy Bedolla MD

## 2020-07-05 NOTE — PROGRESS NOTES
Resting quietly, awake, resp even, unlab with O2 intact. Skin warm, dry. AP reg, lungs sounds diminished. Mid back healed incision with small area of swelling, no redness, no drainage. Assessment completed. Aware not to be out of bed without asst. Call light in reach.

## 2020-07-05 NOTE — PROGRESS NOTES
Request by treating physician/team  for COVID-19 convalescent plasma based on patient meeting one of the following criteria, which I verified by record review:   1. Age at least 25 years   2. Laboratory confirmed diagnosis of infection with SARS-CoV-2   3. Admitted to an acute care facility for the treatment of [id number]-07 complications   4. Severe or life threatening COVID-19, or judged by the treating provider to be at high risk of progression to severe or life-threatening disease   5. Informed consent provided by the patient or healthcare proxy   Severe COVID-19 is defined by one or more of the following:  dyspnea  respiratory frequency ? 30/min  blood oxygen saturation ? 93%  partial pressure of arterial oxygen to fraction of inspired oxygen ratio < 300  lung infiltrates > 50% within 24 to 48 hours Life-threatening COVID-19 is defined as one or more of the following:    respiratory failure  septic shock  multiple organ dysfunction or failure     Consent was obtained by treating physician/team and patient was registered and assigned patient code. Communicated with blood bank and 1 unit of blood type specific COVID convalescent plasma was ordered for the patient. As per study protocol, adverse events will be monitored and reported and all regulatory procedures will be followed.   Patient Code: [id number]    Patient's Last Name: latrell    Patient's First Name: Sea Smith    Patient MRN: [id number]    Patient's YOB: 1935  Purvi Rangel MD  Director, [address] and Blood Disorders Program  [address] [place] [address] Phone

## 2020-07-05 NOTE — PROGRESS NOTES
[employer] is unable to visit patient presently due to covid restrictions    Reviewed chart for spiritual concerns    No needs noted currently      Jose Moncada, staff

## 2020-07-05 NOTE — CONSULTS
Infectious Disease Non-face to Face Encounter    Today's date: 7/5/2020  Admit date: 7/3/2020    Impression:     1. COVID-19 infection (7/4/20)  2. Acute hypoxic respiratory failure  3. S/p T9-L2 fusion, 5/29/20    Recommendations:      Suspect inflammatory illness is all COVID-19, so would not continue vanc/zosyn at this time, MRI findings entirely consistent with post-operative change. Vanc/zosyn will contribute to volume overload and worsening respiratory status.  Obtain blood cultures given fever and post-operative state.  Check baseline ABO compatibility and give convalescent serum if available.  Agree with dexamethasone for 5-10 days depending on clinical response.  ID will evaluate tomorrow for consideration of remdesivir. If ongoing hypoxia present, then we will apply for approval.   Agree with DVT prophylaxis   Recommend screening for HIV and viral hepatitis   Monitoring:  o At least q 48 hour CBC with diff, CMP  o Aggressive pulse Ox monitoring  o Conservative fluid strategy - would not give empiric fluid resuscitation unless signs of volume depletion (hypotension, RHETT, etc) are present  o Consider self proning patient    [facility] Remdesivir request form - [email or web address]  Fact Sheet for Patients And Parent/Caregivers   Fact Sheets for Patients and Parent/Caregivers - Bengali    Pharmacy address:  [address] [place] [address]    Primary contact: [email or web address]. Dilip Dessert  [phone]    Secondary contact: [email or web address] [email or web address]  [phone]    Pharmacy permit # [id number] exp 7/57/8774    Anti-Infectives:      IV vanc/zosyn    Clinical Synopsis:     ID consulted for COVID. Allergies:   Allergies   Allergen Reactions    Lipitor [Atorvastatin] Myalgia    Plavix [Clopidogrel] Nausea Only     Change of taste with food       Anticoagulants:  Key Anti-Platelet Anticoagulant Meds             aspirin 81 mg chewable tablet (Taking) Take 81 mg by mouth daily. Objective:     Physical Exam:    Vitals:   Current   Temperature: 98.8 °F (37.1 °C)   Heart Rate: 72   Resp Rate: 20   Blood Pressure: 127/58   Oxygen Sats: 98 %         Patient Vitals for the past 12 hrs:   Temp Pulse Resp BP SpO2   07/05/20 1220 98.8 °F (37.1 °C) 72 20 127/58 98 %   07/05/20 0955 (!) 100.6 °F (38.1 °C) (!) 102 18 136/70 96 %   07/05/20 0311 99.1 °F (37.3 °C) 94 20 118/66 97 %       No components found for: QTC      Labs:     COVID 19 related labs:  No results found for: CRP, LDH    CBC:      CBC WITH AUTOMATED DIFF    Collection Time: 07/05/20  4:47 AM   Result Value Ref Range    WBC 7.4 4.3 - 11.1 K/uL    RBC 3.29 (L) 4.23 - 5.6 M/uL    HGB 10.1 (L) 13.6 - 17.2 g/dL    HCT 29.7 (L) 41.1 - 50.3 %    MCV 90.3 79.6 - 97.8 FL    MCH 30.7 26.1 - 32.9 PG    MCHC 34.0 31.4 - 35.0 g/dL    RDW 13.8 11.9 - 14.6 %    PLATELET 698 244 - 662 K/uL    MPV 10.0 9.4 - 12.3 FL    ABSOLUTE NRBC 0.00 0.0 - 0.2 K/uL    DF AUTOMATED      NEUTROPHILS 78 43 - 78 %    LYMPHOCYTES 17 13 - 44 %    MONOCYTES 4 4.0 - 12.0 %    EOSINOPHILS 0 (L) 0.5 - 7.8 %    BASOPHILS 0 0.0 - 2.0 %    IMMATURE GRANULOCYTES 1 0.0 - 5.0 %    ABS. NEUTROPHILS 5.7 1.7 - 8.2 K/UL    ABS. LYMPHOCYTES 1.3 0.5 - 4.6 K/UL    ABS. MONOCYTES 0.3 0.1 - 1.3 K/UL    ABS. EOSINOPHILS 0.0 0.0 - 0.8 K/UL    ABS. BASOPHILS 0.0 0.0 - 0.2 K/UL    ABS. IMM. GRANS. 0.0 0.0 - 0.5 K/UL       CMP:  No results found for this visit on 07/03/20 (from the past 12 hour(s)). Microbiology:     All Micro Results     None          Recent Imaging:     Reviewed     Signed By: [unfilled]     July 5, 2020      Please note, requesting provider was notified via [employer] of the above documentation for his or her review. The patient's status was discussed with the patient's primary provider as well as the patient's primary nurse.     ID did not physically enter the patient's room, nor did ID use a remote telehealth monitor, due to facility restrictions on the use of personal protective equipment and the need to preserve personal protective equipment at this time.     Time spent on the above: 10 minutes

## 2020-07-06 LAB
ABO + RH BLD: NORMAL
ALBUMIN SERPL-MCNC: 2.2 G/DL (ref 3.2–4.6)
ALBUMIN/GLOB SERPL: 0.6 {RATIO} (ref 1.2–3.5)
ALP SERPL-CCNC: 56 U/L (ref 50–136)
ALT SERPL-CCNC: 12 U/L (ref 12–65)
ANION GAP SERPL CALC-SCNC: 8 MMOL/L (ref 7–16)
AST SERPL-CCNC: 31 U/L (ref 15–37)
BASOPHILS # BLD: 0 K/UL (ref 0–0.2)
BASOPHILS NFR BLD: 0 % (ref 0–2)
BILIRUB SERPL-MCNC: 0.5 MG/DL (ref 0.2–1.1)
BLOOD GROUP ANTIBODIES SERPL: NORMAL
BUN SERPL-MCNC: 9 MG/DL (ref 8–23)
CALCIUM SERPL-MCNC: 7.9 MG/DL (ref 8.3–10.4)
CHLORIDE SERPL-SCNC: 106 MMOL/L (ref 98–107)
CO2 SERPL-SCNC: 24 MMOL/L (ref 21–32)
CREAT SERPL-MCNC: 0.7 MG/DL (ref 0.8–1.5)
CRP SERPL-MCNC: 8.1 MG/DL (ref 0–0.9)
DIFFERENTIAL METHOD BLD: ABNORMAL
EOSINOPHIL # BLD: 0 K/UL (ref 0–0.8)
EOSINOPHIL NFR BLD: 0 % (ref 0.5–7.8)
ERYTHROCYTE [DISTWIDTH] IN BLOOD BY AUTOMATED COUNT: 13.5 % (ref 11.9–14.6)
FERRITIN SERPL-MCNC: 416 NG/ML (ref 8–388)
GLOBULIN SER CALC-MCNC: 3.6 G/DL (ref 2.3–3.5)
GLUCOSE SERPL-MCNC: 97 MG/DL (ref 65–100)
HCT VFR BLD AUTO: 28 % (ref 41.1–50.3)
HGB BLD-MCNC: 9.7 G/DL (ref 13.6–17.2)
IMM GRANULOCYTES # BLD AUTO: 0 K/UL (ref 0–0.5)
IMM GRANULOCYTES NFR BLD AUTO: 1 % (ref 0–5)
LDH SERPL L TO P-CCNC: 309 U/L (ref 110–210)
LYMPHOCYTES # BLD: 1.3 K/UL (ref 0.5–4.6)
LYMPHOCYTES NFR BLD: 24 % (ref 13–44)
MAGNESIUM SERPL-MCNC: 1.9 MG/DL (ref 1.8–2.4)
MCH RBC QN AUTO: 30.6 PG (ref 26.1–32.9)
MCHC RBC AUTO-ENTMCNC: 34.6 G/DL (ref 31.4–35)
MCV RBC AUTO: 88.3 FL (ref 79.6–97.8)
MONOCYTES # BLD: 0.4 K/UL (ref 0.1–1.3)
MONOCYTES NFR BLD: 7 % (ref 4–12)
NEUTS SEG # BLD: 3.7 K/UL (ref 1.7–8.2)
NEUTS SEG NFR BLD: 69 % (ref 43–78)
NRBC # BLD: 0 K/UL (ref 0–0.2)
PLATELET # BLD AUTO: 179 K/UL (ref 150–450)
PMV BLD AUTO: 9.4 FL (ref 9.4–12.3)
POTASSIUM SERPL-SCNC: 3.4 MMOL/L (ref 3.5–5.1)
PROT SERPL-MCNC: 5.8 G/DL (ref 6.3–8.2)
RBC # BLD AUTO: 3.17 M/UL (ref 4.23–5.6)
SODIUM SERPL-SCNC: 138 MMOL/L (ref 136–145)
SPECIMEN EXP DATE BLD: NORMAL
WBC # BLD AUTO: 5.4 K/UL (ref 4.3–11.1)

## 2020-07-06 PROCEDURE — 86140 C-REACTIVE PROTEIN: CPT

## 2020-07-06 PROCEDURE — 74011000258 HC RX REV CODE- 258: Performed by: INTERNAL MEDICINE

## 2020-07-06 PROCEDURE — 80053 COMPREHEN METABOLIC PANEL: CPT

## 2020-07-06 PROCEDURE — 86900 BLOOD TYPING SEROLOGIC ABO: CPT

## 2020-07-06 PROCEDURE — 85025 COMPLETE CBC W/AUTO DIFF WBC: CPT

## 2020-07-06 PROCEDURE — 36430 TRANSFUSION BLD/BLD COMPNT: CPT

## 2020-07-06 PROCEDURE — 74011250636 HC RX REV CODE- 250/636: Performed by: FAMILY MEDICINE

## 2020-07-06 PROCEDURE — 74011250637 HC RX REV CODE- 250/637: Performed by: FAMILY MEDICINE

## 2020-07-06 PROCEDURE — 30233K1 TRANSFUSION OF NONAUTOLOGOUS FROZEN PLASMA INTO PERIPHERAL VEIN, PERCUTANEOUS APPROACH: ICD-10-PCS | Performed by: INTERNAL MEDICINE

## 2020-07-06 PROCEDURE — 82728 ASSAY OF FERRITIN: CPT

## 2020-07-06 PROCEDURE — 87040 BLOOD CULTURE FOR BACTERIA: CPT

## 2020-07-06 PROCEDURE — 36415 COLL VENOUS BLD VENIPUNCTURE: CPT

## 2020-07-06 PROCEDURE — 83615 LACTATE (LD) (LDH) ENZYME: CPT

## 2020-07-06 PROCEDURE — 65270000029 HC RM PRIVATE

## 2020-07-06 PROCEDURE — 83735 ASSAY OF MAGNESIUM: CPT

## 2020-07-06 PROCEDURE — 92526 ORAL FUNCTION THERAPY: CPT

## 2020-07-06 PROCEDURE — 74011000250 HC RX REV CODE- 250: Performed by: INTERNAL MEDICINE

## 2020-07-06 RX ADMIN — TRAMADOL HYDROCHLORIDE 50 MG: 50 TABLET, FILM COATED ORAL at 12:50

## 2020-07-06 RX ADMIN — Medication 10 ML: at 16:44

## 2020-07-06 RX ADMIN — HYDROCODONE BITARTRATE AND ACETAMINOPHEN 1 TABLET: 5; 325 TABLET ORAL at 20:54

## 2020-07-06 RX ADMIN — REMDESIVIR 200 MG: 100 INJECTION, POWDER, LYOPHILIZED, FOR SOLUTION INTRAVENOUS at 16:43

## 2020-07-06 RX ADMIN — LEVOTHYROXINE SODIUM 100 MCG: 0.1 TABLET ORAL at 05:14

## 2020-07-06 RX ADMIN — GABAPENTIN 300 MG: 300 CAPSULE ORAL at 08:04

## 2020-07-06 RX ADMIN — DOCUSATE SODIUM 200 MG: 100 CAPSULE, LIQUID FILLED ORAL at 08:05

## 2020-07-06 RX ADMIN — DOXAZOSIN 2 MG: 1 TABLET ORAL at 08:03

## 2020-07-06 RX ADMIN — Medication 10 ML: at 05:14

## 2020-07-06 RX ADMIN — GABAPENTIN 300 MG: 300 CAPSULE ORAL at 16:43

## 2020-07-06 RX ADMIN — ASPIRIN 81 MG: 81 TABLET, CHEWABLE ORAL at 08:04

## 2020-07-06 RX ADMIN — HYDROCODONE BITARTRATE AND ACETAMINOPHEN 1 TABLET: 5; 325 TABLET ORAL at 08:03

## 2020-07-06 RX ADMIN — PANTOPRAZOLE SODIUM 40 MG: 40 TABLET, DELAYED RELEASE ORAL at 05:14

## 2020-07-06 RX ADMIN — GABAPENTIN 300 MG: 300 CAPSULE ORAL at 20:54

## 2020-07-06 RX ADMIN — ENOXAPARIN SODIUM 40 MG: 40 INJECTION SUBCUTANEOUS at 08:05

## 2020-07-06 RX ADMIN — Medication 10 ML: at 20:54

## 2020-07-06 RX ADMIN — HYDROCODONE BITARTRATE AND ACETAMINOPHEN 1 TABLET: 5; 325 TABLET ORAL at 14:17

## 2020-07-06 RX ADMIN — LISINOPRIL 5 MG: 5 TABLET ORAL at 08:03

## 2020-07-06 RX ADMIN — DEXAMETHASONE 6 MG: 4 TABLET ORAL at 08:04

## 2020-07-06 NOTE — PROGRESS NOTES
Upon reviewing chart, primary RN, Dee Miner, noted that the patient had normal saline continuous infusion at 100mL/hr ordered. However, per Dr. Roxy Barnhart note, he recommended a conservative fluid strategy and no empiric fluid resuscitation unless the patient had signs of volume depletion. Patient's BP stable without hypotension (-136 and MAP in the high 80s and 90s). Dr. Myron Bee gave verbal orders via telephone to discontinue the fluids. Furthermore, Dr. Claude Fendt stated in his note to check baseline ABO compatibility and give convalescent plasma if available. The convalescent plasma was ordered but the patient's last type & screen was 5/29/2020 and ABO compatibility has not been checked. Inquired with Dr. Myron Bee regarding the type/screen and ABO compatibility but she was not certain if the patient would need new type/screen or if ABO compatibility check was sufficient. Thus, Dr. Myron Bee gave verbal orders via telephone for a new type/screen in order to avoid delays in patient receiving the ordered convalescent plasma. Additionally, SLP recommended in notes on 7/4 for the patient to be on a pureed diet with thin liquids and medications crushed in applesauce. Patient currently has regular diet ordered. Dr. Myron Bee gave verbal orders to change the diet to the diet recommended by SLP. Primary RN, Dee Miner, aware of all of the above stated.

## 2020-07-06 NOTE — PROGRESS NOTES
Hospitalist Note     Admit Date:  7/3/2020  1:42 PM   Name:  Jaylan Donovan   Age:  82 y.o.  :  1935   MRN:  [id number]   PCP:  Vick Fritz MD  Treatment Team: Attending Provider: Cesar Silverman MD; Consulting Provider: Holden Langston NP; Utilization Review: Shazia Otto RN    HPI/Subjective:   Mr. Fatmata Higginbotham YB 41 y. o. male with a past medical history of CAD, HTN, hypthyroidism and recent back surgery who presented on 7/3 with a 1 week history of chills. He also has had difficulty swallowing and eating per his wife's report. COVID +. On 2L O2. Started on dexamethasone; plasma ; started on Remdesivir . MRI T/L spine done and r/o surgical complication. : In bed, comfortable on 2L NC O2. Remdesivir started today; also getting plasma. Ambulated to  with nurse, no major limitations. I update his wife via phone this afternoon. No other complaints  Objective:     Patient Vitals for the past 24 hrs:   Temp Pulse Resp BP SpO2   20 1518 98 °F (36.7 °C) 82 20 123/67 95 %   20 1446 98 °F (36.7 °C) 81 18 137/66 96 %   20 1104 97.8 °F (36.6 °C) 89 20 115/65 94 %   20 0720 97.9 °F (36.6 °C) 92 20 136/74 94 %   20 0325 98.3 °F (36.8 °C) 95 20 134/74 93 %   20 2333 100 °F (37.8 °C) 93 17 114/67 91 %   20 2248 (!) 100.7 °F (38.2 °C) (!) 104 18 122/80 95 %   20 1925 (!) 101 °F (38.3 °C) 100 18 140/61 96 %   20 1610 99.9 °F (37.7 °C) 90 18 128/65 95 %     Oxygen Therapy  O2 Sat (%): 95 % (20 1518)  Pulse via Oximetry: 92 beats per minute (20 0116)  O2 Device: Nasal cannula (20 0433)  O2 Flow Rate (L/min): 2 l/min (20 0433)    Estimated body mass index is 24.41 kg/m² as calculated from the following:    Height as of this encounter: 6' (1.829 m). Weight as of this encounter: 81.6 kg (180 lb).       Intake/Output Summary (Last 24 hours) at 2020 1552  Last data filed at 2020 1509  Gross per 24 hour Intake 560 ml   Output 2750 ml   Net -2190 ml       *Note that automatically entered I/Os may not be accurate; dependent on patient compliance with collection and accurate  by techs. General:    Well nourished. Alert. CV:   RRR. No murmur, rub, or gallop. Lungs:   BB rales. 2L NC O2. No distress. Abdomen:   Soft, nontender, nondistended. Extremities: Warm and dry. No cyanosis or edema. Skin:     No rashes or jaundice. Large, well healing surgical incision on his back. Neuro:  No gross focal deficits    Data Reviewed:  I have reviewed all labs, meds, and studies from the last 24 hours:  Recent Results (from the past 24 hour(s))   TYPE & SCREEN    Collection Time: 07/06/20 12:40 AM   Result Value Ref Range    Crossmatch Expiration 07/09/2020     ABO/Rh(D) A NEGATIVE     Antibody screen NEG    CBC WITH AUTOMATED DIFF    Collection Time: 07/06/20  5:35 AM   Result Value Ref Range    WBC 5.4 4.3 - 11.1 K/uL    RBC 3.17 (L) 4.23 - 5.6 M/uL    HGB 9.7 (L) 13.6 - 17.2 g/dL    HCT 28.0 (L) 41.1 - 50.3 %    MCV 88.3 79.6 - 97.8 FL    MCH 30.6 26.1 - 32.9 PG    MCHC 34.6 31.4 - 35.0 g/dL    RDW 13.5 11.9 - 14.6 %    PLATELET 516 438 - 383 K/uL    MPV 9.4 9.4 - 12.3 FL    ABSOLUTE NRBC 0.00 0.0 - 0.2 K/uL    DF AUTOMATED      NEUTROPHILS 69 43 - 78 %    LYMPHOCYTES 24 13 - 44 %    MONOCYTES 7 4.0 - 12.0 %    EOSINOPHILS 0 (L) 0.5 - 7.8 %    BASOPHILS 0 0.0 - 2.0 %    IMMATURE GRANULOCYTES 1 0.0 - 5.0 %    ABS. NEUTROPHILS 3.7 1.7 - 8.2 K/UL    ABS. LYMPHOCYTES 1.3 0.5 - 4.6 K/UL    ABS. MONOCYTES 0.4 0.1 - 1.3 K/UL    ABS. EOSINOPHILS 0.0 0.0 - 0.8 K/UL    ABS. BASOPHILS 0.0 0.0 - 0.2 K/UL    ABS. IMM.  GRANS. 0.0 0.0 - 0.5 K/UL   METABOLIC PANEL, COMPREHENSIVE    Collection Time: 07/06/20  5:35 AM   Result Value Ref Range    Sodium 138 136 - 145 mmol/L    Potassium 3.4 (L) 3.5 - 5.1 mmol/L    Chloride 106 98 - 107 mmol/L    CO2 24 21 - 32 mmol/L    Anion gap 8 7 - 16 mmol/L    Glucose 97 65 - 100 mg/dL    BUN 9 8 - 23 MG/DL    Creatinine 0.70 (L) 0.8 - 1.5 MG/DL    GFR est AA >60 >60 ml/min/1.73m2    GFR est non-AA >60 >60 ml/min/1.73m2    Calcium 7.9 (L) 8.3 - 10.4 MG/DL    Bilirubin, total 0.5 0.2 - 1.1 MG/DL    ALT (SGPT) 12 12 - 65 U/L    AST (SGOT) 31 15 - 37 U/L    Alk.  phosphatase 56 50 - 136 U/L    Protein, total 5.8 (L) 6.3 - 8.2 g/dL    Albumin 2.2 (L) 3.2 - 4.6 g/dL    Globulin 3.6 (H) 2.3 - 3.5 g/dL    A-G Ratio 0.6 (L) 1.2 - 3.5     MAGNESIUM    Collection Time: 07/06/20  5:35 AM   Result Value Ref Range    Magnesium 1.9 1.8 - 2.4 mg/dL   FERRITIN    Collection Time: 07/06/20  5:35 AM   Result Value Ref Range    Ferritin 416 (H) 8 - 388 NG/ML   LD    Collection Time: 07/06/20  5:35 AM   Result Value Ref Range     (H) 110 - 210 U/L   C REACTIVE PROTEIN, QT    Collection Time: 07/06/20  5:35 AM   Result Value Ref Range    C-Reactive protein 8.1 (H) 0.0 - 0.9 mg/dL        Current Meds:  Current Facility-Administered Medications   Medication Dose Route Frequency    remdesivir 200 mg in 0.9% sodium chloride 250 mL IVPB  200 mg IntraVENous ONCE    Followed by   Carline Montague ON 7/7/2020] remdesivir 100 mg in 0.9% sodium chloride 250 mL IVPB  100 mg IntraVENous Q24H    dexAMETHasone (DECADRON) tablet 6 mg  6 mg Oral DAILY    0.9% sodium chloride infusion 250 mL  250 mL IntraVENous PRN    aspirin chewable tablet 81 mg  81 mg Oral DAILY    docusate sodium (COLACE) capsule 200 mg  200 mg Oral DAILY    doxazosin (CARDURA) tablet 2 mg  2 mg Oral DAILY    pantoprazole (PROTONIX) tablet 40 mg  40 mg Oral ACB    gabapentin (NEURONTIN) capsule 300 mg  300 mg Oral TID    levothyroxine (SYNTHROID) tablet 100 mcg  100 mcg Oral ACB    lisinopriL (PRINIVIL, ZESTRIL) tablet 5 mg  5 mg Oral DAILY    traMADoL (ULTRAM) tablet 50 mg  50 mg Oral Q6H PRN    sodium chloride (NS) flush 5-40 mL  5-40 mL IntraVENous Q8H    sodium chloride (NS) flush 5-40 mL  5-40 mL IntraVENous PRN    acetaminophen (TYLENOL) tablet 650 mg  650 mg Oral Q4H PRN    ondansetron (ZOFRAN) injection 4 mg  4 mg IntraVENous Q4H PRN    magnesium hydroxide (MILK OF MAGNESIA) 400 mg/5 mL oral suspension 30 mL  30 mL Oral DAILY PRN    enoxaparin (LOVENOX) injection 40 mg  40 mg SubCUTAneous Q24H    HYDROcodone-acetaminophen (NORCO) 5-325 mg per tablet 1 Tab  1 Tab Oral Q6H PRN       Other Studies:  No results found for this visit on 07/03/20. No results found. All Micro Results     Procedure Component Value Units Date/Time    CULTURE, BLOOD [[id number]] Collected:  07/06/20 0040    Order Status:  Completed Specimen:  Blood Updated:  07/06/20 0116    CULTURE, BLOOD [[id number]] Collected:  07/06/20 0040    Order Status:  Completed Specimen:  Blood Updated:  07/06/20 0115          SARS-CoV-2 Lab Results  \"Novel Coronavirus\" Test: No results found for: COV2NT   \"Emergent Disease\" Test: No results found for: EDPR  \"SARS-COV-2\" Test: No results found for: XGCOVT  As of: 3:52 PM on 7/6/2020          Assessment and Plan:     Hospital Problems as of 7/6/2020 Date Reviewed: 5/22/2015          Codes Class Noted - Resolved POA    Essential hypertension ICD-10-CM: I10  ICD-9-CM: 401.9  7/4/2020 - Present Unknown        COVID-19 virus infection ICD-10-CM: U07.1  ICD-9-CM: 079.89  7/4/2020 - Present Unknown        * (Principal) Acute respiratory failure with hypoxia ([facility] [address] ICD-10-CM: J96.01  ICD-9-CM: 518.81  7/3/2020 - Present Yes        Fever ICD-10-CM: R50.9  ICD-9-CM: 780.60  7/3/2020 - Present Yes        Suspected COVID-19 virus infection ICD-10-CM: Z20.828  ICD-9-CM: V01.79  7/3/2020 - Present Yes        GERD (gastroesophageal reflux disease) ICD-10-CM: K21.9  ICD-9-CM: 530.81  Unknown - Present Yes    Overview Signed 1/24/2012  9:53 AM by Pascale Srinivasan MD     controlled with otc meds             Thyroid disease ICD-10-CM: E07.9  ICD-9-CM: 246. 9  Unknown - Present Yes    Overview Signed 1/24/2012  9:53 AM by Pascale Srinivasan MD hypothyroidism                   Plan:  # Acute hypoxemic respiratory failure and sepsis 2/2 COVID-19   - Sepsis resolved. - Dexamethasone; plasma 7/6   - Remdesivir started 7/6   - Wean O2 as able    # Hypothyroidism   - Synthroid    # HTN   - Home meds    # H/o thoracolumbar spinal stenosis   - S/p surgery last month. - MRIs this stay have r/o surgical complication as cause for fevers. All related to respiratory issues. DC planning/Dispo: Unclear. Con't current plan of care.    Diet:  DIET NUTRITIONAL SUPPLEMENTS  DIET REGULAR  DVT ppx: Lovenox    Signed:  Jennifer Norton MD

## 2020-07-06 NOTE — PROGRESS NOTES
Chart screened by  for discharge planning. No needs identified at this time.   Please consult  if any new issues arise    Care Management Interventions  PCP Verified by CM: No  Mode of Transport at Discharge: Self  Transition of Care Consult (CM Consult): Discharge Planning  Speech Therapy Consult: Yes  Current Support Network: Own Home, Lives with Spouse  Confirm Follow Up Transport: Family  Discharge Location  Discharge Placement: Unable to determine at this time

## 2020-07-06 NOTE — PROGRESS NOTES
Beside shift report received from [facility]  Patient lying in bed  Respirations present  No signs of distress  Call light within reach  Safety measures in place

## 2020-07-06 NOTE — PROGRESS NOTES
Pt transferred to unit 5 via wheelchair. Pts belongings also transferred with him. Spoke to pt's wife as she was informed of the transfer and rationale for the transfer. She voiced understanding.

## 2020-07-06 NOTE — PROGRESS NOTES
Problem: Falls - Risk of  Goal: *Absence of Falls  Description: Document Raymundo Chandler Fall Risk and appropriate interventions in the flowsheet. Outcome: Progressing Towards Goal  Note: Fall Risk Interventions:  Mobility Interventions: Communicate number of staff needed for ambulation/transfer         Medication Interventions: Patient to call before getting OOB    Elimination Interventions: Call light in reach, Patient to call for help with toileting needs, Elevated toilet seat    History of Falls Interventions: Evaluate medications/consider consulting pharmacy         Problem: Risk for Spread of Infection  Goal: Prevent transmission of infectious organism to others  Description: Prevent the transmission of infectious organisms to other patients, staff members, and visitors.   Outcome: Progressing Towards Goal

## 2020-07-06 NOTE — PROGRESS NOTES
Nutrition Follow Up    Best Practice Alert for Malnutrition Screening Tool: Recently Lost Weight Without Trying: Yes, If Yes, How Much Weight Loss: 2-13 lbs, Eating Poorly Due to Decreased Appetite: Yes    Assessment:   Diet: DIET NUTRITIONAL SUPPLEMENTS All Meals; Ensure Enlive ( )  DIET PUREED    Food/Nutrition Patient History:    Admitted with acute respiratory failure with hypoxia, fever, COVID positive. PMH remarkable for CAD, HTN, hypothyroidism. Spinal surgery  5/29/20 per EMR review. He reports difficulties swallowing for several days. He indicates he doesn't get choked but is \"simple not able to swallow. \"  Patient was seen by speech and they recommended puree with thin liquids. Attempted to call patient twice today with no answer. Spoke with Miriam Mckeon RN. She say patient ate all of his breakfast this morning and drank his ensure today. She is unsure of yesterday. Anthropometrics:   72\",   81.6 kg (180 lb)  as of 7/3/2020, no source identified  , There is no height or weight on file to calculate BMI. BMI class of Normal weight based on story board 24.41 kg/m²   6' (182.9 cm)  as of 7/3/2020   Wt hx per EMR review below  WT / BMI 5/29/2020 3/18/2020   WEIGHT 186 lb 2 oz 186 lb 7 oz   Unable to determine if pt has experienced weight loss at this time as his reported UBW is less than minimal EMR records available and current weight is without a source. Macronutrient needs: (using Admission weight 82 kg)  EER: 2720-2379 kcal/day (20-25 kcal/kg)  EPR:  g/day (1-1.25 g/kg)    Nutrition Intervention:  Meals and Snacks: Continue with current diet. Commercial Beverages and Supplements: Continue with Ensure enlive TID.   Coordination of nutrition care by a Nutrition Professional: Discussed with Miriam Mckeon RN  Discharge Nutrition Plan: Too soon to determine    Shaista Rojo [address] [place] [address]

## 2020-07-06 NOTE — CDMP QUERY
Patient admitted with Acute Respiratory failure, suspected Covid-19 infection. Noted documentation of \"acute respiratory failure\" in H&P on 7-3-20. Please address the documentation of this diagnosis in one of the following ways in medical record:   
 
? -Please provide the clinical indicators (such as the ones below) to support the documented diagnosis of Acute Respiratory Failure Air hunger/gasping; Use of accessory muscles of respiration/increased work of breathing; Sternal or intercostal retractions; Stridor; Inability to speak in full sentences; Cyanosis; Respirations <12 or >25; Pulse ox <90% RA or <95% on O2; pH <7.35 or >7.45; pO2 < 60 mm Hg (or 10mm below COPD patient's baseline); pCO2 >50mm Hg (or 10mm above COPD patient's baseline) ? -Acute Respiratory Failure ruled out after study ? -Other, please specify ? -Unable to determine The medical record reflects the following: 
 
   Risk Factors: COVID 19 positive Clinical Indicators: H&P documentation: \" Lungs: Clear to auscultation bilaterally without wheezes or crackles. \"  Respirations 20 Treatment: 02 @ 2L N/C Thank you, Marisol Barajas RN Clinical  
[phone]

## 2020-07-06 NOTE — CDMP QUERY
Pt admitted with Acute Respiratory failure, COVID-19 and noted to have fever, elevated HR and elevated respirations. If possible, please document in progress notes and discharge summary if you are evaluating and/or treating: 
 
? Sepsis due to COVID-19 
? COVID-19 without sepsis ? Other, please specify ? Unable to clinically determine The medical record reflects the following 
: 
  Risk Factors: COVID -19 positive Clinical Indicators:  7-4-20  Temp 101, HR 94, respirations 20 with PNA Treatment: IV Zosyn, Vancomycin Thank you, Loletha Kehr, RN Clinical  
[phone]

## 2020-07-06 NOTE — PROGRESS NOTES
STG: Patient will tolerate puree diet with thin liquids without clinical s/sx of aspiration or airway compromise. MET 07/06/20  STG: Patient will tolerate diet upgrade trials without clinical s/sx of aspiration or airway compromise. MET 07/06/20    LTG: Patient will tolerate least restrictive diet without overt clinical s/sx of aspiration or airway compromise. MET 07/06/20    SPEECH LANGUAGE PATHOLOGY: DYSPHAGIA- Daily Note and Discharge    NAME/AGE/GENDER: Deedee Shi is a 82 y.o. male  DATE: 7/6/2020  PRIMARY DIAGNOSIS: Acute respiratory failure with [facility] ([facility] [J96.01]      ICD-10: Treatment Diagnosis: R13.14 Dysphagia, Pharyngoesophageal Phase    RECOMMENDATIONS   DIET:    PO:  Regular   Liquids:  regular thin    MEDICATIONS: With liquid  one at a time     ASPIRATION PRECAUTIONS  · Slow rate of intake  · Small bites/sips  · Upright at 90 degrees during meal     COMPENSATORY STRATEGIES/MODIFICATIONS  · Small sips and bites     EDUCATION:  · Recommendations discussed with Nursing  · Patient     RECOMMENDATIONS for CONTINUED SPEECH THERAPY:   No further speech therapy indicated at this time. ASSESSMENT   Patient demonstrates ability to safely consume regular consistencies and thin liquids without overt s/sx of airway compromise. Some complaints of cracker \"sticking on back of my tongue\", but cleared with liquid wash. Recommend regular diet with thin liquids. Alternate liquids and solids during meals. Medications one at a time with liquid wash. No further speech therapy indicated at this time. Please re-consult if new concerns arise. REHABILITATION POTENTIAL FOR STATED GOALS: Fair    PLAN    FREQUENCY/DURATION: No further speech therapy indicated at this time as oropharyngeal swallow function is within normal limits. - Recommendations for next treatment session: No additional speech therapy indicated at this time. SUBJECTIVE   Agreeable to treatment.  Expresses displeasure with puree diet. He now denies dysphagia, but endorses poor appetite since surgery. History of Present Injury/Illness: Mr. Kai Johnston  has a past medical history of BPH (benign prostatic hyperplasia), CAD (coronary artery disease) (2016), Chronic pain, GERD (gastroesophageal reflux disease), Hypertension, Kidney stone, Spinal stenosis, and Thyroid disease. He also has no past medical history of Difficult intubation, Malignant hyperthermia due to anesthesia, Nausea & vomiting, or Pseudocholinesterase deficiency. Sarai Brenden He also  has a past surgical history that includes hx appendectomy (1957); hx heart catheterization (,2016, 2017); neurological procedure unlisted (2001); hx lumbar fusion (2002); hx lumbar fusion (2006); hx lumbar fusion; hx cataract removal (Bilateral); and hx colonoscopy. Problem List:  (Impairments causing functional limitations):  1. Dysphagia    Previous Dysphagia: Patient reports that he started having difficulty about 5 weeks ago  Diet Prior to Evaluation:     Orientation:   Person    Pain: Pain Scale 1: Numeric (0 - 10)  Pain Intensity 1: 7  Pain Location 1: Back  Pain Intervention(s) 1: Food;Nurse notified  o     OBJECTIVE   Swallow treatment. Patient presented with thin liquid via cup and straw, puree, mixed, and solid consistencies. Initially reluctant to try cracker as he feels it will be \"too dry\". However appropriate oral prep with all textures. Timely swallow initiation, and single swallows upon palpation. Adequate oral clearing when alternating between liquids and solids. No overt signs or symptoms of airway compromise observed with liquid or solid textures.        INTERDISCIPLINARY COLLABORATION: Speech Therapist and Registered Nurse  PRECAUTIONS/ALLERGIES: Lipitor [atorvastatin] and Plavix [clopidogrel]     Tool Used: Dysphagia Outcome and Severity Scale (LULÚ)    Score Comments   Normal Diet  [] 7 With no strategies or extra time needed   Functional Swallow  [] 6 May have mild oral or pharyngeal delay   Mild Dysphagia  [] 5 Which may require one diet consistency restricted    Mild-Moderate Dysphagia  [] 4 With 1-2 diet consistencies restricted   Moderate Dysphagia  [] 3 With 2 or more diet consistencies restricted   Moderate-Severe Dysphagia  [] 2 With partial PO strategies (trials with ST only)   Severe Dysphagia  [] 1 With inability to tolerate any PO safely      Score:  Initial: 4 Most Recent:  7(Date 07/06/20 )   Interpretation of Tool: The Dysphagia Outcome and Severity Scale (LULÚ) is a simple, easy-to-use, 7-point scale developed to systematically rate the functional severity of dysphagia based on objective assessment and make recommendations for diet level, independence level, and type of nutrition. Current Medications:   No current facility-administered medications on file prior to encounter. Current Outpatient Medications on File Prior to Encounter   Medication Sig Dispense Refill    HYDROcodone-acetaminophen (Norco) 5-325 mg per tablet Take 1 Tab by mouth every six (6) hours as needed for Pain. Per patient every 6 hours medication is taken      docusate sodium (COLACE) 100 mg capsule Take 200 mg by mouth daily.  gabapentin (NEURONTIN) 300 mg capsule Take 300 mg by mouth three (3) times daily.  esomeprazole (NexIUM) 20 mg capsule Take 20 mg by mouth daily.  traMADoL (ULTRAM) 50 mg tablet Take 50 mg by mouth every six (6) hours as needed for Pain.  lisinopriL (PRINIVIL, ZESTRIL) 5 mg tablet Take 5 mg by mouth daily.  aspirin 81 mg chewable tablet Take 81 mg by mouth daily.  doxazosin (CARDURA) 2 mg tablet Take 2 mg by mouth daily.  levothyroxine (LEVOTHROID) 100 mcg tablet Take 100 mcg by mouth Daily (before breakfast).  polyethylene glycol (MIRALAX) 17 gram packet Take 17 g by mouth daily as needed for Constipation. mix with 6 oz of fluids         After treatment position/precautions:  · Upright in bed   · MD notified.      Total Treatment Duration:   Time In: 1151  Time Out: [address] CCC-SLP

## 2020-07-07 LAB
ALBUMIN SERPL-MCNC: 2.4 G/DL (ref 3.2–4.6)
ALBUMIN/GLOB SERPL: 0.6 {RATIO} (ref 1.2–3.5)
ALP SERPL-CCNC: 59 U/L (ref 50–136)
ALT SERPL-CCNC: 14 U/L (ref 12–65)
ANION GAP SERPL CALC-SCNC: 6 MMOL/L (ref 7–16)
AST SERPL-CCNC: 28 U/L (ref 15–37)
BASOPHILS # BLD: 0 K/UL (ref 0–0.2)
BASOPHILS NFR BLD: 0 % (ref 0–2)
BILIRUB SERPL-MCNC: 0.4 MG/DL (ref 0.2–1.1)
BLD PROD TYP BPU: NORMAL
BLOOD BANK CMNT PATIENT-IMP: NORMAL
BPU ID: NORMAL
BUN SERPL-MCNC: 10 MG/DL (ref 8–23)
CALCIUM SERPL-MCNC: 8.8 MG/DL (ref 8.3–10.4)
CHLORIDE SERPL-SCNC: 107 MMOL/L (ref 98–107)
CO2 SERPL-SCNC: 27 MMOL/L (ref 21–32)
CREAT SERPL-MCNC: 0.64 MG/DL (ref 0.8–1.5)
DIFFERENTIAL METHOD BLD: ABNORMAL
EOSINOPHIL # BLD: 0 K/UL (ref 0–0.8)
EOSINOPHIL NFR BLD: 0 % (ref 0.5–7.8)
ERYTHROCYTE [DISTWIDTH] IN BLOOD BY AUTOMATED COUNT: 13.2 % (ref 11.9–14.6)
GLOBULIN SER CALC-MCNC: 4 G/DL (ref 2.3–3.5)
GLUCOSE SERPL-MCNC: 132 MG/DL (ref 65–100)
HCT VFR BLD AUTO: 29.7 % (ref 41.1–50.3)
HGB BLD-MCNC: 10.1 G/DL (ref 13.6–17.2)
IMM GRANULOCYTES # BLD AUTO: 0 K/UL (ref 0–0.5)
IMM GRANULOCYTES NFR BLD AUTO: 1 % (ref 0–5)
LYMPHOCYTES # BLD: 0.7 K/UL (ref 0.5–4.6)
LYMPHOCYTES NFR BLD: 16 % (ref 13–44)
MAGNESIUM SERPL-MCNC: 2.2 MG/DL (ref 1.8–2.4)
MCH RBC QN AUTO: 29.9 PG (ref 26.1–32.9)
MCHC RBC AUTO-ENTMCNC: 34 G/DL (ref 31.4–35)
MCV RBC AUTO: 87.9 FL (ref 79.6–97.8)
MONOCYTES # BLD: 0.5 K/UL (ref 0.1–1.3)
MONOCYTES NFR BLD: 11 % (ref 4–12)
NEUTS SEG # BLD: 3 K/UL (ref 1.7–8.2)
NEUTS SEG NFR BLD: 72 % (ref 43–78)
NRBC # BLD: 0 K/UL (ref 0–0.2)
PLATELET # BLD AUTO: 197 K/UL (ref 150–450)
PLATELET COMMENTS,PCOM: ADEQUATE
PMV BLD AUTO: 9.7 FL (ref 9.4–12.3)
POTASSIUM SERPL-SCNC: 3.9 MMOL/L (ref 3.5–5.1)
PROT SERPL-MCNC: 6.4 G/DL (ref 6.3–8.2)
RBC # BLD AUTO: 3.38 M/UL (ref 4.23–5.6)
RBC MORPH BLD: ABNORMAL
SODIUM SERPL-SCNC: 140 MMOL/L (ref 136–145)
STATUS OF UNIT,%ST: NORMAL
UNIT DIVISION, %UDIV: NORMAL
WBC # BLD AUTO: 4.2 K/UL (ref 4.3–11.1)
WBC MORPH BLD: ABNORMAL

## 2020-07-07 PROCEDURE — 74011000258 HC RX REV CODE- 258: Performed by: INTERNAL MEDICINE

## 2020-07-07 PROCEDURE — 80053 COMPREHEN METABOLIC PANEL: CPT

## 2020-07-07 PROCEDURE — 74011250636 HC RX REV CODE- 250/636: Performed by: FAMILY MEDICINE

## 2020-07-07 PROCEDURE — 85025 COMPLETE CBC W/AUTO DIFF WBC: CPT

## 2020-07-07 PROCEDURE — 65270000029 HC RM PRIVATE

## 2020-07-07 PROCEDURE — 74011250637 HC RX REV CODE- 250/637: Performed by: FAMILY MEDICINE

## 2020-07-07 PROCEDURE — 83735 ASSAY OF MAGNESIUM: CPT

## 2020-07-07 PROCEDURE — 74011000250 HC RX REV CODE- 250: Performed by: INTERNAL MEDICINE

## 2020-07-07 RX ADMIN — PANTOPRAZOLE SODIUM 40 MG: 40 TABLET, DELAYED RELEASE ORAL at 06:28

## 2020-07-07 RX ADMIN — LISINOPRIL 5 MG: 5 TABLET ORAL at 08:38

## 2020-07-07 RX ADMIN — HYDROCODONE BITARTRATE AND ACETAMINOPHEN 1 TABLET: 5; 325 TABLET ORAL at 13:41

## 2020-07-07 RX ADMIN — Medication 10 ML: at 21:06

## 2020-07-07 RX ADMIN — DOCUSATE SODIUM 200 MG: 100 CAPSULE, LIQUID FILLED ORAL at 08:39

## 2020-07-07 RX ADMIN — GABAPENTIN 300 MG: 300 CAPSULE ORAL at 08:38

## 2020-07-07 RX ADMIN — HYDROCODONE BITARTRATE AND ACETAMINOPHEN 1 TABLET: 5; 325 TABLET ORAL at 06:28

## 2020-07-07 RX ADMIN — LEVOTHYROXINE SODIUM 100 MCG: 0.1 TABLET ORAL at 06:28

## 2020-07-07 RX ADMIN — ENOXAPARIN SODIUM 40 MG: 40 INJECTION SUBCUTANEOUS at 08:39

## 2020-07-07 RX ADMIN — HYDROCODONE BITARTRATE AND ACETAMINOPHEN 1 TABLET: 5; 325 TABLET ORAL at 21:04

## 2020-07-07 RX ADMIN — Medication 10 ML: at 06:28

## 2020-07-07 RX ADMIN — GABAPENTIN 300 MG: 300 CAPSULE ORAL at 21:03

## 2020-07-07 RX ADMIN — DEXAMETHASONE 6 MG: 4 TABLET ORAL at 08:39

## 2020-07-07 RX ADMIN — TRAMADOL HYDROCHLORIDE 50 MG: 50 TABLET, FILM COATED ORAL at 15:02

## 2020-07-07 RX ADMIN — GABAPENTIN 300 MG: 300 CAPSULE ORAL at 16:07

## 2020-07-07 RX ADMIN — REMDESIVIR 100 MG: 100 INJECTION, POWDER, LYOPHILIZED, FOR SOLUTION INTRAVENOUS at 16:08

## 2020-07-07 RX ADMIN — SODIUM CHLORIDE 30 ML: 900 INJECTION, SOLUTION INTRAVENOUS at 17:41

## 2020-07-07 RX ADMIN — Medication 10 ML: at 13:55

## 2020-07-07 RX ADMIN — ASPIRIN 81 MG: 81 TABLET, CHEWABLE ORAL at 08:39

## 2020-07-07 RX ADMIN — DOXAZOSIN 2 MG: 1 TABLET ORAL at 08:39

## 2020-07-07 NOTE — PROGRESS NOTES
SBAR received from Carlos Armando, [facility]. Pt in stable condition and resting in bed. Call light in reach. Will continue to monitor.

## 2020-07-07 NOTE — PROGRESS NOTES
Hospitalist Note     Admit Date:  7/3/2020  1:42 PM   Name:  Polina Donovan   Age:  82 y.o.  :  1935   MRN:  [id number]   PCP:  Vicente Hawkins MD  Treatment Team: Attending Provider: Trisha Garrison MD; Consulting Provider: Anna Dunbar NP; Utilization Review: Bijan Ashley RN; Care Manager: Mc Cortes RN; Primary Nurse: Ngoc Mcbride    HPI/Subjective:   Mr. Kamron Lei RL 25 y. o. male with a past medical history of CAD, HTN, hypthyroidism and recent back surgery who presented on 7/3 with a 1 week history of chills. He also has had difficulty swallowing and eating per his wife's report. COVID +. On 2L O2. Started on dexamethasone; plasma ; started on Remdesivir . MRI T/L spine done and r/o surgical complication. : In bed, comfortable on 2L NC O2. Remdesivir started today; also getting plasma. Ambulated to  with nurse, no major limitations. I updated his wife via phone this afternoon.  patient is doing well this morning. On 2 L oxygen by nasal cannula. shortness of breath improved. No fever no chills. Objective:     Patient Vitals for the past 24 hrs:   Temp Pulse Resp BP SpO2   20 1129 97.8 °F (36.6 °C) 81 19 115/64 93 %   20 0724 98 °F (36.7 °C) 70 19 145/77 93 %   20 0401 97.5 °F (36.4 °C) 68 18 156/79 92 %   20 2246 97.8 °F (36.6 °C) 65 19 150/79 96 %   20 2000 97.9 °F (36.6 °C) 77 18 143/78 94 %   20 1640 98 °F (36.7 °C) 75 20 129/66 94 %   20 1518 98 °F (36.7 °C) 82 20 123/67 95 %   20 1446 98 °F (36.7 °C) 81 18 137/66 96 %     Oxygen Therapy  O2 Sat (%): 93 % (20 1129)  Pulse via Oximetry: 92 beats per minute (20 0116)  O2 Device: Nasal cannula (20 1410)  O2 Flow Rate (L/min): 2 l/min (20 1410)    Estimated body mass index is 24.41 kg/m² as calculated from the following:    Height as of this encounter: 6' (1.829 m).     Weight as of this encounter: 81.6 kg (180 lb).      Intake/Output Summary (Last 24 hours) at 7/7/2020 1445  Last data filed at 7/7/2020 0604  Gross per 24 hour   Intake 620 ml   Output 1900 ml   Net -1280 ml       *Note that automatically entered I/Os may not be accurate; dependent on patient compliance with collection and accurate  by techs. General:    Well nourished. Alert, mild increased work of breathing, accessory muscle use,   CV:   RRR. No murmur, rub, or gallop. Lungs:   Bilateral basilar crackles. 2L NC O2. Abdomen:   Soft, nontender, nondistended. Extremities: Warm and dry. No cyanosis or edema. Skin:     No rashes or jaundice. Large, well healing surgical incision on his back. Neuro:  No gross focal deficits    Data Reviewed:  I have reviewed all labs, meds, and studies from the last 24 hours:  Recent Results (from the past 24 hour(s))   CBC WITH AUTOMATED DIFF    Collection Time: 07/07/20  3:50 AM   Result Value Ref Range    WBC 4.2 (L) 4.3 - 11.1 K/uL    RBC 3.38 (L) 4.23 - 5.6 M/uL    HGB 10.1 (L) 13.6 - 17.2 g/dL    HCT 29.7 (L) 41.1 - 50.3 %    MCV 87.9 79.6 - 97.8 FL    MCH 29.9 26.1 - 32.9 PG    MCHC 34.0 31.4 - 35.0 g/dL    RDW 13.2 11.9 - 14.6 %    PLATELET 265 800 - 313 K/uL    MPV 9.7 9.4 - 12.3 FL    ABSOLUTE NRBC 0.00 0.0 - 0.2 K/uL    NEUTROPHILS 72 43 - 78 %    LYMPHOCYTES 16 13 - 44 %    MONOCYTES 11 4.0 - 12.0 %    EOSINOPHILS 0 (L) 0.5 - 7.8 %    BASOPHILS 0 0.0 - 2.0 %    IMMATURE GRANULOCYTES 1 0.0 - 5.0 %    ABS. NEUTROPHILS 3.0 1.7 - 8.2 K/UL    ABS. LYMPHOCYTES 0.7 0.5 - 4.6 K/UL    ABS. MONOCYTES 0.5 0.1 - 1.3 K/UL    ABS. EOSINOPHILS 0.0 0.0 - 0.8 K/UL    ABS. BASOPHILS 0.0 0.0 - 0.2 K/UL    ABS. IMM.  GRANS. 0.0 0.0 - 0.5 K/UL    RBC COMMENTS NORMOCYTIC/NORMOCHROMIC      WBC COMMENTS Result Confirmed By Smear      PLATELET COMMENTS ADEQUATE      DF AUTOMATED     METABOLIC PANEL, COMPREHENSIVE    Collection Time: 07/07/20  3:50 AM   Result Value Ref Range    Sodium 140 136 - 145 mmol/L    Potassium 3.9 3.5 - 5.1 mmol/L    Chloride 107 98 - 107 mmol/L    CO2 27 21 - 32 mmol/L    Anion gap 6 (L) 7 - 16 mmol/L    Glucose 132 (H) 65 - 100 mg/dL    BUN 10 8 - 23 MG/DL    Creatinine 0.64 (L) 0.8 - 1.5 MG/DL    GFR est AA >60 >60 ml/min/1.73m2    GFR est non-AA >60 >60 ml/min/1.73m2    Calcium 8.8 8.3 - 10.4 MG/DL    Bilirubin, total 0.4 0.2 - 1.1 MG/DL    ALT (SGPT) 14 12 - 65 U/L    AST (SGOT) 28 15 - 37 U/L    Alk.  phosphatase 59 50 - 136 U/L    Protein, total 6.4 6.3 - 8.2 g/dL    Albumin 2.4 (L) 3.2 - 4.6 g/dL    Globulin 4.0 (H) 2.3 - 3.5 g/dL    A-G Ratio 0.6 (L) 1.2 - 3.5     MAGNESIUM    Collection Time: 07/07/20  3:50 AM   Result Value Ref Range    Magnesium 2.2 1.8 - 2.4 mg/dL        Current Meds:  Current Facility-Administered Medications   Medication Dose Route Frequency    sodium chloride 0.9 % bolus infusion 30 mL  30 mL IntraVENous DAILY    remdesivir 100 mg in 0.9% sodium chloride 250 mL IVPB  100 mg IntraVENous Q24H    dexAMETHasone (DECADRON) tablet 6 mg  6 mg Oral DAILY    0.9% sodium chloride infusion 250 mL  250 mL IntraVENous PRN    aspirin chewable tablet 81 mg  81 mg Oral DAILY    docusate sodium (COLACE) capsule 200 mg  200 mg Oral DAILY    doxazosin (CARDURA) tablet 2 mg  2 mg Oral DAILY    pantoprazole (PROTONIX) tablet 40 mg  40 mg Oral ACB    gabapentin (NEURONTIN) capsule 300 mg  300 mg Oral TID    levothyroxine (SYNTHROID) tablet 100 mcg  100 mcg Oral ACB    lisinopriL (PRINIVIL, ZESTRIL) tablet 5 mg  5 mg Oral DAILY    traMADoL (ULTRAM) tablet 50 mg  50 mg Oral Q6H PRN    sodium chloride (NS) flush 5-40 mL  5-40 mL IntraVENous Q8H    sodium chloride (NS) flush 5-40 mL  5-40 mL IntraVENous PRN    acetaminophen (TYLENOL) tablet 650 mg  650 mg Oral Q4H PRN    ondansetron (ZOFRAN) injection 4 mg  4 mg IntraVENous Q4H PRN    magnesium hydroxide (MILK OF MAGNESIA) 400 mg/5 mL oral suspension 30 mL  30 mL Oral DAILY PRN    enoxaparin (LOVENOX) injection 40 mg  40 mg SubCUTAneous Q24H    HYDROcodone-acetaminophen (NORCO) 5-325 mg per tablet 1 Tab  1 Tab Oral Q6H PRN       Other Studies:  No results found for this visit on 07/03/20. No results found. All Micro Results     Procedure Component Value Units Date/Time    CULTURE, BLOOD [[id number]] Collected:  07/06/20 0040    Order Status:  Completed Specimen:  Blood Updated:  07/06/20 0116    CULTURE, BLOOD [[id number]] Collected:  07/06/20 0040    Order Status:  Completed Specimen:  Blood Updated:  07/06/20 0115          SARS-CoV-2 Lab Results  \"Novel Coronavirus\" Test: No results found for: COV2NT   \"Emergent Disease\" Test: No results found for: EDPR  \"SARS-COV-2\" Test: No results found for: XGCOVT  As of: 3:52 PM on 7/7/2020          Assessment and Plan:     Hospital Problems as of 7/7/2020 Date Reviewed: 5/22/2015          Codes Class Noted - Resolved POA    Essential hypertension ICD-10-CM: I10  ICD-9-CM: 401.9  7/4/2020 - Present Unknown        COVID-19 virus infection ICD-10-CM: U07.1  ICD-9-CM: 079.89  7/4/2020 - Present Unknown        * (Principal) Acute respiratory failure with hypoxia ([facility] ICD-10-CM: J96.01  ICD-9-CM: 518.81  7/3/2020 - Present Yes        Fever ICD-10-CM: R50.9  ICD-9-CM: 780.60  7/3/2020 - Present Yes        Suspected COVID-19 virus infection ICD-10-CM: Z20.828  ICD-9-CM: V01.79  7/3/2020 - Present Yes        GERD (gastroesophageal reflux disease) ICD-10-CM: K21.9  ICD-9-CM: 530.81  Unknown - Present Yes    Overview Signed 1/24/2012  9:53 AM by Trino Holley MD     controlled with otc meds             Thyroid disease ICD-10-CM: E07.9  ICD-9-CM: 246. 9  Unknown - Present Yes    Overview Signed 1/24/2012  9:53 AM by Trino Holley MD     hypothyroidism                   Plan:  # Acute hypoxemic respiratory failure POA  Currently on 2L O2 by NC. Wean Oxygen as tolerated  Sepsis 2/2 COVID-19 POA   - Sepsis resolved.    - Dexamethasone; s/p convalescent plasma 7/6   - Remdesivir started 7/6    # Hypothyroidism   - Synthroid    # HTN   - Home meds    # H/o thoracolumbar spinal stenosis   - S/p surgery last month. - MRIs this stay have r/o surgical complication as cause for fevers. All related to respiratory issues. DC planning/Dispo: Unclear. Con't current plan of care.      Diet:  DIET NUTRITIONAL SUPPLEMENTS  DIET REGULAR  DVT ppx: Lovenox    Signed:  Ofe Jefferson MD

## 2020-07-07 NOTE — CDMP QUERY
Thank you for addressing my query, but  I don't see where the Acute Respiratory Failure was validated. Could you please let me know where it is documented. Patient admitted with Acute Respiratory failure, suspected Covid-19 infection. Noted documentation of \"acute respiratory failure\" in H&P on 7-3-20. Please address the documentation of this diagnosis in one of the following ways in medical record:   
 
-Please provide the clinical indicators (such as the ones below) to support the documented diagnosis of Acute Respiratory Failure Air hunger/gasping; Use of accessory muscles of respiration/increased work of breathing; Sternal or intercostal retractions; Stridor; Inability to speak in full sentences; Cyanosis; Respirations <12 or >25; Pulse ox <90% RA or <95% on O2; pH <7.35 or >7.45; pO2 < 60 mm Hg (or 10mm below COPD patient's baseline); pCO2 >50mm Hg (or 10mm above COPD patient's baseline) -Acute Respiratory Failure ruled out after study 
-Other, please specify 
-Unable to determine The medical record reflects the following: 
 
   Risk Factors: COVID 19 positive Clinical Indicators: H&P documentation: \" Lungs: Clear to auscultation bilaterally without wheezes or crackles. \"  Respirations 20 Treatment: 02 @ 2L N/C Thank you, Sommer Cherry RN Clinical  
[phone]

## 2020-07-07 NOTE — PROGRESS NOTES
BEVERLY from [facility] [place]. Patient in stable condition with resps even/unlabored. NAD noted. Oxygen noted to nasal cannula.  cath noted hanging to gravity at bedside. Call light within reach, patient encouraged to call nurse prn assist. Will continue to monitor per policy. Droplet plus precautions intact. Appropriate PPE available and in use.

## 2020-07-07 NOTE — PROGRESS NOTES
Convalescent plasma infusion completed:/ time:    07/06/2020 at 1635  ? Volume Infused:   193 ml  ? Patient tolerated infusion: Tolerated well, no transfusion reaction  ? VS pre infusion:      07/06/20 1446   Vital Signs   Temp 98 °F (36.7 °C)   Pulse (Heart Rate) 81   Resp Rate 18   O2 Sat (%) 96 %   Level of Consciousness Alert   /66   MAP (Calculated) 90     ? VS post infusion:      07/06/20 1640   Vital Signs   Temp 98 °F (36.7 °C)   Temp Source Oral   Pulse (Heart Rate) 75   Resp Rate 20   O2 Sat (%) 94 %   /66   MAP (Calculated) 87     ? Change in Baseline Assessment:   No change in baseline assessment.     ?

## 2020-07-07 NOTE — CDMP QUERY
Thank you for addressing my query, but  I don't see where the Acute Respiratory Failure was validated. Could you please let me know where it is documented. Patient admitted with Acute Respiratory failure, suspected Covid-19 infection. Noted documentation of \"acute respiratory failure\" in H&P on 7-3-20. Please address the documentation of this diagnosis in one of the following ways in medical record:   
 
-Please provide the clinical indicators (such as the ones below) to support the documented diagnosis of Acute Respiratory Failure Air hunger/gasping; Use of accessory muscles of respiration/increased work of breathing; Sternal or intercostal retractions; Stridor; Inability to speak in full sentences; Cyanosis; Respirations <12 or >25; Pulse ox <90% RA or <95% on O2; pH <7.35 or >7.45; pO2 < 60 mm Hg (or 10mm below COPD patient's baseline); pCO2 >50mm Hg (or 10mm above COPD patient's baseline) -Acute Respiratory Failure ruled out after study 
-Other, please specify 
-Unable to determine The medical record reflects the following: 
 
   Risk Factors: COVID 19 positive Clinical Indicators: H&P documentation: \" Lungs: Clear to auscultation bilaterally without wheezes or crackles. \"  Respirations 20 Treatment: 02 @ 2L N/C Thank you, Sommer Cherry RN Clinical  
[phone]

## 2020-07-07 NOTE — PROGRESS NOTES
Pt still reporting pain 4-6 on numerical scale after reassessment. Administered prn tramadol at 1502. Will continue to monitor.

## 2020-07-07 NOTE — PROGRESS NOTES
Pt remains in stable condition. Quietly resting in bed. Safety measures in place. Preparing to give shift report to on coming RN.

## 2020-07-08 ENCOUNTER — APPOINTMENT (OUTPATIENT)
Dept: INTERVENTIONAL RADIOLOGY/VASCULAR | Age: 85
DRG: 871 | End: 2020-07-08
Attending: HOSPITALIST
Payer: MEDICARE

## 2020-07-08 LAB
ALBUMIN SERPL-MCNC: 2.4 G/DL (ref 3.2–4.6)
ALBUMIN/GLOB SERPL: 0.6 {RATIO} (ref 1.2–3.5)
ALP SERPL-CCNC: 57 U/L (ref 50–136)
ALT SERPL-CCNC: 17 U/L (ref 12–65)
ANION GAP SERPL CALC-SCNC: 6 MMOL/L (ref 7–16)
APPEARANCE FLD: NORMAL
AST SERPL-CCNC: 26 U/L (ref 15–37)
BASOPHILS # BLD: 0 K/UL (ref 0–0.2)
BASOPHILS NFR BLD: 0 % (ref 0–2)
BILIRUB SERPL-MCNC: 0.3 MG/DL (ref 0.2–1.1)
BUN SERPL-MCNC: 19 MG/DL (ref 8–23)
CALCIUM SERPL-MCNC: 8.9 MG/DL (ref 8.3–10.4)
CHLORIDE SERPL-SCNC: 105 MMOL/L (ref 98–107)
CO2 SERPL-SCNC: 29 MMOL/L (ref 21–32)
COLOR FLD: NORMAL
CREAT SERPL-MCNC: 0.71 MG/DL (ref 0.8–1.5)
DIFFERENTIAL METHOD BLD: ABNORMAL
EOSINOPHIL # BLD: 0 K/UL (ref 0–0.8)
EOSINOPHIL NFR BLD: 0 % (ref 0.5–7.8)
ERYTHROCYTE [DISTWIDTH] IN BLOOD BY AUTOMATED COUNT: 13.2 % (ref 11.9–14.6)
GLOBULIN SER CALC-MCNC: 3.7 G/DL (ref 2.3–3.5)
GLUCOSE FLD-MCNC: 28 MG/DL
GLUCOSE SERPL-MCNC: 147 MG/DL (ref 65–100)
HCT VFR BLD AUTO: 29.1 % (ref 41.1–50.3)
HGB BLD-MCNC: 9.9 G/DL (ref 13.6–17.2)
IMM GRANULOCYTES # BLD AUTO: 0.1 K/UL (ref 0–0.5)
IMM GRANULOCYTES NFR BLD AUTO: 1 % (ref 0–5)
LDH FLD L TO P-CCNC: 648 U/L
LYMPHOCYTES # BLD: 1.1 K/UL (ref 0.5–4.6)
LYMPHOCYTES NFR BLD: 14 % (ref 13–44)
LYMPHOCYTES NFR BRONCH MANUAL: 32 %
MACROPHAGES NFR BRONCH MANUAL: 48 %
MAGNESIUM SERPL-MCNC: 2.1 MG/DL (ref 1.8–2.4)
MCH RBC QN AUTO: 29.9 PG (ref 26.1–32.9)
MCHC RBC AUTO-ENTMCNC: 34 G/DL (ref 31.4–35)
MCV RBC AUTO: 87.9 FL (ref 79.6–97.8)
MONOCYTES # BLD: 0.5 K/UL (ref 0.1–1.3)
MONOCYTES NFR BLD: 7 % (ref 4–12)
NEUTROPHILS NFR BRONCH MANUAL: 20 %
NEUTS SEG # BLD: 5.8 K/UL (ref 1.7–8.2)
NEUTS SEG NFR BLD: 78 % (ref 43–78)
NRBC # BLD: 0 K/UL (ref 0–0.2)
NUC CELL # FLD: 166 /CU MM
PLATELET # BLD AUTO: 226 K/UL (ref 150–450)
PLATELET COMMENTS,PCOM: ADEQUATE
PMV BLD AUTO: 9.6 FL (ref 9.4–12.3)
POTASSIUM SERPL-SCNC: 4.1 MMOL/L (ref 3.5–5.1)
PROT FLD-MCNC: 3.7 G/DL
PROT SERPL-MCNC: 6.1 G/DL (ref 6.3–8.2)
RBC # BLD AUTO: 3.31 M/UL (ref 4.23–5.6)
RBC # FLD: NORMAL /CU MM
RBC MORPH BLD: ABNORMAL
RBC MORPH BLD: ABNORMAL
SODIUM SERPL-SCNC: 140 MMOL/L (ref 136–145)
SPECIMEN SOURCE FLD: NORMAL
WBC # BLD AUTO: 7.5 K/UL (ref 4.3–11.1)
WBC MORPH BLD: ABNORMAL

## 2020-07-08 PROCEDURE — 89050 BODY FLUID CELL COUNT: CPT

## 2020-07-08 PROCEDURE — 80053 COMPREHEN METABOLIC PANEL: CPT

## 2020-07-08 PROCEDURE — 87205 SMEAR GRAM STAIN: CPT

## 2020-07-08 PROCEDURE — 74011000250 HC RX REV CODE- 250: Performed by: INTERNAL MEDICINE

## 2020-07-08 PROCEDURE — 77030014143 HC TY PUNC LUMBR BD -A

## 2020-07-08 PROCEDURE — 82042 OTHER SOURCE ALBUMIN QUAN EA: CPT

## 2020-07-08 PROCEDURE — 97530 THERAPEUTIC ACTIVITIES: CPT

## 2020-07-08 PROCEDURE — 97161 PT EVAL LOW COMPLEX 20 MIN: CPT

## 2020-07-08 PROCEDURE — 83615 LACTATE (LD) (LDH) ENZYME: CPT

## 2020-07-08 PROCEDURE — 97165 OT EVAL LOW COMPLEX 30 MIN: CPT

## 2020-07-08 PROCEDURE — 74011250637 HC RX REV CODE- 250/637: Performed by: FAMILY MEDICINE

## 2020-07-08 PROCEDURE — 83735 ASSAY OF MAGNESIUM: CPT

## 2020-07-08 PROCEDURE — 76942 ECHO GUIDE FOR BIOPSY: CPT

## 2020-07-08 PROCEDURE — 74011000258 HC RX REV CODE- 258: Performed by: INTERNAL MEDICINE

## 2020-07-08 PROCEDURE — 82945 GLUCOSE OTHER FLUID: CPT

## 2020-07-08 PROCEDURE — 97535 SELF CARE MNGMENT TRAINING: CPT

## 2020-07-08 PROCEDURE — C1729 CATH, DRAINAGE: HCPCS

## 2020-07-08 PROCEDURE — 85025 COMPLETE CBC W/AUTO DIFF WBC: CPT

## 2020-07-08 PROCEDURE — 0R9A3ZX DRAINAGE OF THORACOLUMBAR VERTEBRAL JOINT, PERCUTANEOUS APPROACH, DIAGNOSTIC: ICD-10-PCS | Performed by: RADIOLOGY

## 2020-07-08 PROCEDURE — 74011250636 HC RX REV CODE- 250/636: Performed by: FAMILY MEDICINE

## 2020-07-08 PROCEDURE — 65270000029 HC RM PRIVATE

## 2020-07-08 PROCEDURE — 84157 ASSAY OF PROTEIN OTHER: CPT

## 2020-07-08 RX ADMIN — GABAPENTIN 300 MG: 300 CAPSULE ORAL at 21:36

## 2020-07-08 RX ADMIN — DEXAMETHASONE 6 MG: 4 TABLET ORAL at 09:15

## 2020-07-08 RX ADMIN — DOCUSATE SODIUM 200 MG: 100 CAPSULE, LIQUID FILLED ORAL at 09:18

## 2020-07-08 RX ADMIN — ASPIRIN 81 MG: 81 TABLET, CHEWABLE ORAL at 09:15

## 2020-07-08 RX ADMIN — ENOXAPARIN SODIUM 40 MG: 40 INJECTION SUBCUTANEOUS at 09:15

## 2020-07-08 RX ADMIN — Medication 5 ML: at 22:00

## 2020-07-08 RX ADMIN — LISINOPRIL 5 MG: 5 TABLET ORAL at 09:15

## 2020-07-08 RX ADMIN — DOXAZOSIN 2 MG: 1 TABLET ORAL at 09:15

## 2020-07-08 RX ADMIN — REMDESIVIR 100 MG: 100 INJECTION, POWDER, LYOPHILIZED, FOR SOLUTION INTRAVENOUS at 16:20

## 2020-07-08 RX ADMIN — GABAPENTIN 300 MG: 300 CAPSULE ORAL at 09:15

## 2020-07-08 RX ADMIN — Medication 10 ML: at 05:40

## 2020-07-08 RX ADMIN — GABAPENTIN 300 MG: 300 CAPSULE ORAL at 16:20

## 2020-07-08 RX ADMIN — LEVOTHYROXINE SODIUM 100 MCG: 0.1 TABLET ORAL at 06:11

## 2020-07-08 RX ADMIN — SODIUM CHLORIDE 30 ML: 900 INJECTION, SOLUTION INTRAVENOUS at 16:21

## 2020-07-08 RX ADMIN — HYDROCODONE BITARTRATE AND ACETAMINOPHEN 1 TABLET: 5; 325 TABLET ORAL at 21:36

## 2020-07-08 RX ADMIN — HYDROCODONE BITARTRATE AND ACETAMINOPHEN 1 TABLET: 5; 325 TABLET ORAL at 14:26

## 2020-07-08 RX ADMIN — Medication 10 ML: at 14:28

## 2020-07-08 NOTE — PROGRESS NOTES
Problem: Mobility Impaired (Adult and Pediatric)  Goal: *Therapy Goal (Edit Goal, Insert Text)  Outcome: Progressing Towards Goal  Note: STG:  (1.)Mr. Donovan will move from supine to sit and sit to supine , scoot up and down, and roll side to side with STAND BY ASSIST within 4 treatment day(s). (2.)Mr. Donovan will transfer from bed to chair and chair to bed with STAND BY ASSIST using the least restrictive device within 4 treatment day(s). (3.)Mr. Donovan will ambulate with STAND BY ASSIST for 100 feet with the least restrictive device within 4 treatment day(s). LTG:  (1.)Mr. Donovan will move from supine to sit and sit to supine , scoot up and down, and roll side to side in bed with MODIFIED INDEPENDENCE within 7 treatment day(s). (2.)Mr. Donovan will transfer from bed to chair and chair to bed with MODIFIED INDEPENDENCE using the least restrictive device within 7 treatment day(s). (3.)Mr. Donovan will ambulate with MODIFIED INDEPENDENCE for 200 feet with the least restrictive device within 7 treatment day(s).   ________________________________________________________________________________________________       PHYSICAL THERAPY: Initial Assessment and PM 7/8/2020  INPATIENT: PT Visit Days : 1  [place] / [place] / Product Type: Medicare /       NAME/AGE/GENDER: Jessica Jacobs is a 82 y.o. male   PRIMARY DIAGNOSIS: Acute respiratory failure with hypoxia ([address] [J96.01] Acute respiratory failure with hypoxia ([address] Acute respiratory failure with hypoxia ([address]        ICD-10: Treatment Diagnosis:    Generalized Muscle Weakness (M62.81)  Other lack of cordination (R27.8)  Difficulty in walking, Not elsewhere classified (R26.2)  Other abnormalities of gait and mobility (R26.89)   Precaution/Allergies:  Lipitor [atorvastatin] and Plavix [clopidogrel]      ASSESSMENT:     Mr. Lance Vogt   is a pleasant frail elderly male with above diagnosis who demonstrates with decreased transfers, ambulation and mobility below his prior functional baseline. Left LE foot drop. All transfers are currently limited at [address] x 1 from seated to stand followed by ambulation without assistive device for 40 feet with the deficits stated below. He then returns to seated in bedside chair with CGA x 1 with all essentials in reach. Skilled PT is indicated for this patient's functional mobility deficits. Overall good progress towards physical therapy goals is anticipated. Goals listed above are appropriate. PT will continue efforts as patient is still below functional baseline. This section established at most recent assessment   PROBLEM LIST (Impairments causing functional limitations):  Decreased Strength  Decreased Transfer Abilities  Decreased Ambulation Ability/Technique  Decreased Balance  Decreased Activity Tolerance   INTERVENTIONS PLANNED: (Benefits and precautions of physical therapy have been discussed with the patient.)  Balance Exercise  Therapeutic Activites  Therapeutic Exercise/Strengthening  Transfer Training     TREATMENT PLAN: Frequency/Duration: 4 times a week for duration of hospital stay  Rehabilitation Potential For Stated Goals: Good     REHAB RECOMMENDATIONS (at time of discharge pending progress):    Placement: It is my opinion, based on this patient's performance to date, that Mr. Donovan may benefit from [address] after discharge due to the functional deficits listed above that are likely to improve with skilled rehabilitation because he/she has multiple medical issues that affect his/her functional mobility in the community. Equipment:   None at this time              HISTORY:   History of Present Injury/Illness (Reason for Referral):  Iza Reveles is an 82 y.o. male with a past medical history of CAD, HTN, hypthyroidism who presents to the ER with report of chills for the the past week or so.  He also has had difficulty swallowing and eating per his wife's report. Denies any SOB, cough, or pain. REVIEW OF SYSTEMS: Comprehensive ROS performed. Denies nausea, vomiting, admits to fevers, chills, malaise, weakness, otherwise negative. Past Medical History/Comorbidities:   Mr. Harmony East  has a past medical history of BPH (benign prostatic hyperplasia), CAD (coronary artery disease) (2016), Chronic pain, GERD (gastroesophageal reflux disease), Hypertension, Kidney stone, Spinal stenosis, and Thyroid disease. He also has no past medical history of Difficult intubation, Malignant hyperthermia due to anesthesia, Nausea & vomiting, or Pseudocholinesterase deficiency. Mr. Harmony East  has a past surgical history that includes hx appendectomy (1957); hx heart catheterization (,2016, 2017); neurological procedure unlisted (2001); hx lumbar fusion (2002); hx lumbar fusion (2006); hx lumbar fusion; hx cataract removal (Bilateral); hx colonoscopy; and ir fine needle aspiration w image (7/8/2020). Social History/Living Environment:   Home Environment: Private residence  # Steps to Enter: 2  One/Two Story Residence: One story  Living Alone: No  Support Systems: Spouse/Significant Other/Partner  Patient Expects to be Discharged to[de-identified] Private residence  Current DME Used/Available at Home: Brace/Splint, Dorcas Mandril, straight, Walker, rolling, Transfer bench  Prior Level of Function/Work/Activity:  Limited by footdrop left LE with prior lumbar surgery. Dominant Side:         RIGHT    Personal Factors:          Sex:  male        Age:  82 y.o.    Number of Personal Factors/Comorbidities that affect the Plan of Care: 0: LOW COMPLEXITY   EXAMINATION:   Most Recent Physical Functioning:   Gross Assessment:  AROM: Generally decreased, functional  PROM: Generally decreased, functional  Strength: Generally decreased, functional  Coordination: Generally decreased, functional  Tone: Normal  Sensation: Intact               Posture:  Posture (WDL): Exceptions to WDL  Posture Assessment: Cervical, Forward head, Rounded shoulders  Balance:  Sitting: Impaired  Sitting - Static: Good (unsupported)  Sitting - Dynamic: Fair (occasional)  Standing: Impaired  Standing - Static: Fair  Standing - Dynamic : Constant support; Fair Bed Mobility:  Scooting: Contact guard assistance  Wheelchair Mobility:     Transfers:  Sit to Stand: Contact guard assistance  Stand to Sit: Contact guard assistance  Bed to Chair: Contact guard assistance  Gait:     Base of Support: Center of gravity altered  Speed/Gilda: Delayed; Fluctuations; Pace decreased (<100 feet/min); Shuffled; Slow  Step Length: Left shortened;Right shortened  Swing Pattern: Left asymmetrical;Right asymmetrical  Stance: Left decreased;Right decreased;Time;Weight shift  Gait Abnormalities: Decreased step clearance  Distance (ft): 40 Feet (ft)  Ambulation - Level of Assistance: Contact guard assistance  Interventions: Manual cues; Safety awareness training; Tactile cues; Verbal cues; Visual/Demos      Body Structures Involved:  Bones  Joints  Muscles  Ligaments Body Functions Affected:  Neuromusculoskeletal  Movement Related  Skin Related Activities and Participation Affected:  General Tasks and Demands  Communication  Mobility  Self Care  Community, Social and [facility]   Number of elements that affect the Plan of Care: 1-2: LOW COMPLEXITY   CLINICAL PRESENTATION:   Presentation: Stable and uncomplicated: LOW COMPLEXITY   CLINICAL DECISION MAKIN [place] [id number] AM-PAC 6 Clicks   Basic Mobility Inpatient Short Form  How much difficulty does the patient currently have. .. Unable A Lot A Little None   1. Turning over in bed (including adjusting bedclothes, sheets and blankets)? [] 1   [] 2   [x] 3   [] 4   2. Sitting down on and standing up from a chair with arms ( e.g., wheelchair, bedside commode, etc.)   [] 1   [] 2   [x] 3   [] 4   3. Moving from lying on back to sitting on the side of the bed?    [] 1   [] 2   [x] 3   [] 4   How much help from another person does the patient currently need. .. Total A Lot A Little None   4. Moving to and from a bed to a chair (including a wheelchair)? [] 1   [] 2   [x] 3   [] 4   5. Need to walk in hospital room? [] 1   [] 2   [x] 3   [] 4   6. Climbing 3-5 steps with a railing? [] 1   [] 2   [x] 3   [] 4   © 2007, Trustees of [address] [place] [address] [address] [address], under license to [employer]. All rights reserved      Score:  Initial: 18 Most Recent: X (Date: -- )    Interpretation of Tool:  Represents activities that are increasingly more difficult (i.e. Bed mobility, Transfers, Gait). Medical Necessity:     Patient demonstrates   good   rehab potential due to higher previous functional level. Reason for Services/Other Comments:  Patient continues to require skilled intervention due to   medical complications and decreased lumbar and mobility with left LE foot drop. .   Use of outcome tool(s) and clinical judgement create a POC that gives a: Clear prediction of patient's progress: LOW COMPLEXITY            TREATMENT:   (In addition to Assessment/Re-Assessment sessions the following treatments were rendered)   Pre-treatment Symptoms/Complaints:  0/10 no pain reported. Pain: Initial:   Pain Intensity 1: 0  Pain Location 1: Back  Post Session:  0/10 no pain reported. Therapeutic Activity: (    23 mins): Therapeutic activities including Bed transfers, Chair transfers, and Ambulation on level ground to improve mobility, strength, balance, and coordination. Required minimal Manual cues; Safety awareness training; Tactile cues; Verbal cues; Visual/Demos to promote coordination of bilateral, lower extremity(s) and promote motor control of bilateral, lower extremity(s). Braces/Orthotics/Lines/Etc:   cruz catheter  O2 Device: Nasal cannula  Treatment/Session Assessment:    Response to Treatment:  improved transfers, ambulation and mobility. Is able to maintain foot clearance for short distances.        Interdisciplinary Collaboration: Physical Therapist  Registered Nurse  After treatment position/precautions:   Up in chair  Call light within reach   Compliance with Program/Exercises: Will assess as treatment progresses  Recommendations/Intent for next treatment session: \"Next visit will focus on advancements to more challenging activities, reduction in assistance provided, and transfers, ambulation and increased mobility. \".   Total Treatment Duration:  PT Patient Time In/Time Out  Time In: 1000  Time Out: [id number] [address] [place]

## 2020-07-08 NOTE — CONSULTS
[facility] SPINE    July 8, 2020    Admit Date: 7/3/2020  Admit Diagnosis: Acute respiratory failure with hypoxia [facility]) [J96.01]     Patient had been seen by Dr. Waldemar Pollock just prior to admission and his wound was aspirated and found to be consistent with sterile postoperative seroma which is expected to spontaneously resolve over the following 4-6 weeks. Now patient admitted with fever and respiratory distress. Was initially placed on empiric antibiotics while workup was being performed. MRIs of the thoracic and lumbar spine were obtained with contrast and were not suggestive of postoperative infection. Now his Covid test is positive which explains his admitting symptoms. The empiric antibiotics have been stopped and the fevers have not returned. His post operative fluid collection is expected and has recently been assessed with aspiration. With continued clinical improvement despite antibiotics, a stable well healed wound, and benign MRI findings, the lumbar wound is not likely involved in his current symptomology. If there is continued concern from recurring fevers, draining wound, positive blood cultures, etc., then the wound could be aspirated again and sent for cultures. However repeated aspirations do introduce the risk of secondarily and iatrogenically seeding the otherwise sterile fluid collection. If indicated, IR would most likely have the most controlled setting in which his wound could be aspirated.          Signed By: Traci Rodriguez MD

## 2020-07-08 NOTE — PROGRESS NOTES
Patient resting quietly in bed at this time. No distress noted or verbalized omn assessment. Patient AOX4 calm and cooperative. Call light within reach.

## 2020-07-08 NOTE — PROGRESS NOTES
Hospitalist Note     Admit Date:  7/3/2020  1:42 PM   Name:  Ruchi Donovan   Age:  82 y.o.  :  1935   MRN:  [id number]   PCP:  Miguelina Lopez MD  Treatment Team: Attending Provider: Minerva Martinez MD; Consulting Provider: Toro Salinas NP; Utilization Review: Larissa Calixto RN; Care Manager: Elizabeth Burns RN; Physical Therapist: Yoli Cantu, PT; Consulting Provider: Fredy Cole MD; Occupational Therapist: Linwood Ovalle OT    HPI/Subjective:   Mr. William Roberts OO 16 y. o. male with a past medical history of CAD, HTN, hypthyroidism and recent back surgery who presented on 7/3 with a 1 week history of chills. He also has had difficulty swallowing and eating per his wife's report. COVID +. On 2L O2. Started on dexamethasone; plasma ; started on Remdesivir . MRI T/L spine done and r/o surgical complication. : In bed, comfortable on 2L NC O2. Remdesivir started today; also getting plasma. Ambulated to  with nurse, no major limitations. I updated his wife via phone this afternoon.  patient is doing well this morning. On 2 L oxygen by nasal cannula. shortness of breath improved. No fever no chills.  Pt reports feeling better. No fever, on 2L Oxygen by NC.    Objective:     Patient Vitals for the past 24 hrs:   Temp Pulse Resp BP SpO2   20 1454 97.9 °F (36.6 °C) 75 20 135/67 97 %   20 1105 97.8 °F (36.6 °C) 100 20 129/82 93 %   20 0718 97.6 °F (36.4 °C) 66 20 155/81 97 %   20 0303 98.2 °F (36.8 °C) 68 20 142/70 94 %   20 2255 98.1 °F (36.7 °C) 73 20 156/89 97 %   20 2047 97.9 °F (36.6 °C) 69 20 143/73 96 %     Oxygen Therapy  O2 Sat (%): 97 % (07/08/20 1454)  Pulse via Oximetry: 92 beats per minute (20 0116)  O2 Device: Nasal cannula (20 1336)  O2 Flow Rate (L/min): 2 l/min (20 1336)    Estimated body mass index is 24.41 kg/m² as calculated from the following:    Height as of this encounter: 6' (1.829 m). Weight as of this encounter: 81.6 kg (180 lb). Intake/Output Summary (Last 24 hours) at 7/8/2020 1541  Last data filed at 7/8/2020 1319  Gross per 24 hour   Intake 760 ml   Output 1600 ml   Net -840 ml       *Note that automatically entered I/Os may not be accurate; dependent on patient compliance with collection and accurate  by techs. General:    Well nourished. Alert, mild increased work of breathing, accessory muscle use,   CV:   RRR. No murmur, rub, or gallop. Lungs:   Bilateral basilar crackles. 2L NC O2. Abdomen:   Soft, nontender, nondistended. Extremities: Warm and dry. No cyanosis or edema. Skin:     No rashes or jaundice. Large, well healing surgical incision on his back. Neuro:  No gross focal deficits    Data Reviewed:  I have reviewed all labs, meds, and studies from the last 24 hours:  Recent Results (from the past 24 hour(s))   CBC WITH AUTOMATED DIFF    Collection Time: 07/08/20  3:20 AM   Result Value Ref Range    WBC 7.5 4.3 - 11.1 K/uL    RBC 3.31 (L) 4.23 - 5.6 M/uL    HGB 9.9 (L) 13.6 - 17.2 g/dL    HCT 29.1 (L) 41.1 - 50.3 %    MCV 87.9 79.6 - 97.8 FL    MCH 29.9 26.1 - 32.9 PG    MCHC 34.0 31.4 - 35.0 g/dL    RDW 13.2 11.9 - 14.6 %    PLATELET 244 044 - 504 K/uL    MPV 9.6 9.4 - 12.3 FL    ABSOLUTE NRBC 0.00 0.0 - 0.2 K/uL    NEUTROPHILS 78 43 - 78 %    LYMPHOCYTES 14 13 - 44 %    MONOCYTES 7 4.0 - 12.0 %    EOSINOPHILS 0 (L) 0.5 - 7.8 %    BASOPHILS 0 0.0 - 2.0 %    IMMATURE GRANULOCYTES 1 0.0 - 5.0 %    ABS. NEUTROPHILS 5.8 1.7 - 8.2 K/UL    ABS. LYMPHOCYTES 1.1 0.5 - 4.6 K/UL    ABS. MONOCYTES 0.5 0.1 - 1.3 K/UL    ABS. EOSINOPHILS 0.0 0.0 - 0.8 K/UL    ABS. BASOPHILS 0.0 0.0 - 0.2 K/UL    ABS. IMM.  GRANS. 0.1 0.0 - 0.5 K/UL    RBC COMMENTS SLIGHT  ANISOCYTOSIS + POIKILOCYTOSIS        RBC COMMENTS SLIGHT  MICROCYTOSIS        WBC COMMENTS Result Confirmed By Smear      PLATELET COMMENTS ADEQUATE      DF AUTOMATED     METABOLIC PANEL, COMPREHENSIVE    Collection Time: 07/08/20  3:20 AM   Result Value Ref Range    Sodium 140 136 - 145 mmol/L    Potassium 4.1 3.5 - 5.1 mmol/L    Chloride 105 98 - 107 mmol/L    CO2 29 21 - 32 mmol/L    Anion gap 6 (L) 7 - 16 mmol/L    Glucose 147 (H) 65 - 100 mg/dL    BUN 19 8 - 23 MG/DL    Creatinine 0.71 (L) 0.8 - 1.5 MG/DL    GFR est AA >60 >60 ml/min/1.73m2    GFR est non-AA >60 >60 ml/min/1.73m2    Calcium 8.9 8.3 - 10.4 MG/DL    Bilirubin, total 0.3 0.2 - 1.1 MG/DL    ALT (SGPT) 17 12 - 65 U/L    AST (SGOT) 26 15 - 37 U/L    Alk. phosphatase 57 50 - 136 U/L    Protein, total 6.1 (L) 6.3 - 8.2 g/dL    Albumin 2.4 (L) 3.2 - 4.6 g/dL    Globulin 3.7 (H) 2.3 - 3.5 g/dL    A-G Ratio 0.6 (L) 1.2 - 3.5     MAGNESIUM    Collection Time: 07/08/20  3:20 AM   Result Value Ref Range    Magnesium 2.1 1.8 - 2.4 mg/dL   CELL COUNT, BODY FLUID    Collection Time: 07/08/20  1:41 PM   Result Value Ref Range    BODY FLUID TYPE ABSCESS      FLUID COLOR ORANGE      FLUID APPEARANCE CLOUDY      FLUID RBC CT. 15,000 /cu mm    FLUID WBC COUNT 166 /cu mm   GLUCOSE, FLUID    Collection Time: 07/08/20  1:41 PM   Result Value Ref Range    Fluid Type: ABSCESS      Glucose, body fld. 28 MG/DL   LDH, BODY FLUID    Collection Time: 07/08/20  1:41 PM   Result Value Ref Range    Fluid Type: ABSCESS      LD, body fld. 648 U/L   PROTEIN TOTAL, FLUID    Collection Time: 07/08/20  1:41 PM   Result Value Ref Range    Fluid Type: ABSCESS      Protein total, body fld.  3.7 g/dL        Current Meds:  Current Facility-Administered Medications   Medication Dose Route Frequency    sodium chloride 0.9 % bolus infusion 30 mL  30 mL IntraVENous DAILY    remdesivir 100 mg in 0.9% sodium chloride 250 mL IVPB  100 mg IntraVENous Q24H    dexAMETHasone (DECADRON) tablet 6 mg  6 mg Oral DAILY    0.9% sodium chloride infusion 250 mL  250 mL IntraVENous PRN    aspirin chewable tablet 81 mg  81 mg Oral DAILY    docusate sodium (COLACE) capsule 200 mg  200 mg Oral DAILY    doxazosin (CARDURA) tablet 2 mg  2 mg Oral DAILY    pantoprazole (PROTONIX) tablet 40 mg  40 mg Oral ACB    gabapentin (NEURONTIN) capsule 300 mg  300 mg Oral TID    levothyroxine (SYNTHROID) tablet 100 mcg  100 mcg Oral ACB    lisinopriL (PRINIVIL, ZESTRIL) tablet 5 mg  5 mg Oral DAILY    traMADoL (ULTRAM) tablet 50 mg  50 mg Oral Q6H PRN    sodium chloride (NS) flush 5-40 mL  5-40 mL IntraVENous Q8H    sodium chloride (NS) flush 5-40 mL  5-40 mL IntraVENous PRN    acetaminophen (TYLENOL) tablet 650 mg  650 mg Oral Q4H PRN    ondansetron (ZOFRAN) injection 4 mg  4 mg IntraVENous Q4H PRN    magnesium hydroxide (MILK OF MAGNESIA) 400 mg/5 mL oral suspension 30 mL  30 mL Oral DAILY PRN    enoxaparin (LOVENOX) injection 40 mg  40 mg SubCUTAneous Q24H    HYDROcodone-acetaminophen (NORCO) 5-325 mg per tablet 1 Tab  1 Tab Oral Q6H PRN       Other Studies:  No results found for this visit on 07/03/20. Ir Fine Needle Aspiration W Image    Result Date: 7/8/2020  PROCEDURE: Ultrasound-guided fluid collection aspiration Procedural Personnel Attending physician(s): Marie Christiansen M.D. Pre-procedure diagnosis: Very pleasant 82-year-old gentleman with a postoperative fluid collection in the soft tissue beneath the incision following thoracolumbar laminectomy and posterior stabilization. This patient has been diagnosed with COVID-19. Post-procedure diagnosis: Same Indication: Painful fluid collection, fluid sampling for infection Previous biopsy of same target (QCDR): No Additional clinical history: T9-L2 posterolateral fusion, T9-L3 spinal instrumentation, T11-L1 laminectomy on 2/89/0746 Complications: No immediate complications. IMPRESSION:  Ultrasound-guided aspiration of subcutaneous fluid collection associated with the thoracolumbar incision. Plan: The patient has been returned to his hospital room in good and stable condition. Specimen(s) sent for evaluation. _______________________________________________________________ PROCEDURE SUMMARY: - Percutaneous US-guided large bore needle aspiration - Additional procedure(s): None PROCEDURE DETAILS: Pre-procedure Consent:  Informed written and oral consent for the procedure was obtained after explanation of benefits, risks (including, but not limitted to:  hemorrhage, infection, nondiagnostic biopsy) and alternatives. The patient's questions were answered to their satisfaction. They stated understanding and requested that we proceed. Final verification:  A time-out identifying the patient and planned procedure was performed prior to this procedure. Preparation (MIPS):  Maximal sterile barrier technique (including:  Sterile Ultrasound probe cover, Sterile Ultrasound gel, cap, mask, sterile gown, sterile gloves, sterile drape, hand hygiene, and 2% chlorhexidine cutaneous antisepsis) was used. Reference imaging:  MRI 7/3/2020 Anesthesia/sedation Level of anesthesia/sedation:  No sedation Anesthesia/sedation administered by:  Not applicable Total intra-service sedation time (minutes):  0 Imaging prior to biopsy The patient was positioned Prone. Initial ultrasound was performed. Aspiration target: - Maximal diameter (cm):  Approximately 3 - Location:  Subcutaneous tissues deep to the incision from the thoracolumbar fusion Other findings:  Scattered, rare internal septa identified Aspiration Local anesthesia was administered. Under US guidance, the 6-[place] Yueh needle needle was advanced to the subcutaneous fluid collection and aspiration was performed. Aspiration needle: 6-[place] Yueh needle Volume aspirated (millimeter): 180 Specimen sent to:  Microbiology and chemistry laboratory. Needle removal The aspiration needle was removed and a sterile dressing was applied. Tract embolization:  None Imaging following biopsy Immediate post-biopsy ultrasound was not performed.  Post-biopsy imaging findings:  Not applicable Additional Details Additional description of procedure:  None Equipment details:  None Specimens removed:  Biopsy samples as detailed above Estimated blood loss (mL):  0 Standardized report: [email or web address] Attestation Signer name: Calvin Rendon M.D. I attest that I personally performed the entire procedure. I reviewed the stored images and agree with the report as written.        All Micro Results     Procedure Component Value Units Date/Time    CULTURE, BODY FLUID Rosaline Knock STAIN [[id number]] Collected:  07/08/20 1341    Order Status:  Completed Updated:  07/08/20 1538    CULTURE, BLOOD [[id number]] Collected:  07/06/20 0040    Order Status:  Completed Specimen:  Blood Updated:  07/08/20 1222     Special Requests: RIGHT ANTECUBITAL        Culture result: NO GROWTH 2 DAYS       CULTURE, BLOOD [[id number]] Collected:  07/06/20 0040    Order Status:  Completed Specimen:  Blood Updated:  07/08/20 1222     Special Requests: --        RIGHT  FOREARM       Culture result: NO GROWTH 2 DAYS             SARS-CoV-2 Lab Results  \"Novel Coronavirus\" Test: No results found for: COV2NT   \"Emergent Disease\" Test: No results found for: EDPR  \"SARS-COV-2\" Test: No results found for: XGCOVT  As of: 3:52 PM on 7/8/2020          Assessment and Plan:     Hospital Problems as of 7/8/2020 Date Reviewed: 5/22/2015          Codes Class Noted - Resolved POA    Essential hypertension ICD-10-CM: I10  ICD-9-CM: 401.9  7/4/2020 - Present Unknown        COVID-19 virus infection ICD-10-CM: U07.1  ICD-9-CM: 079.89  7/4/2020 - Present Unknown        * (Principal) Acute respiratory failure with hypoxia ([facility] [address] ICD-10-CM: J96.01  ICD-9-CM: 518.81  7/3/2020 - Present Yes        Fever ICD-10-CM: R50.9  ICD-9-CM: 780.60  7/3/2020 - Present Yes        Suspected COVID-19 virus infection ICD-10-CM: Z20.828  ICD-9-CM: V01.79  7/3/2020 - Present Yes        GERD (gastroesophageal reflux disease) ICD-10-CM: K21.9  ICD-9-CM: 530.81  Unknown - Present Yes    Overview Signed 1/24/2012  9:53 AM by Iva Roth MD     controlled with otc meds             Thyroid disease ICD-10-CM: E07.9  ICD-9-CM: 246. 9  Unknown - Present Yes    Overview Signed 1/24/2012  9:53 AM by Iva Roth MD     hypothyroidism                   Plan:  # Acute hypoxemic respiratory failure POA  Currently on 2L O2 by NC. Wean Oxygen as tolerated  Sepsis 2/2 COVID-19 POA   - Sepsis resolved. - Dexamethasone; s/p convalescent plasma 7/6   - Remdesivir started 7/6    # Hypothyroidism   - Synthroid    # HTN   - Home meds    # H/o thoracolumbar spinal stenosis   - S/p surgery last month. - MRI thoracolumbar spine on admission most likely postoperative seroma. Interventional radiology consulted per orthopedic spine surgery Dr. Turner Chavez. IR drainage this afternoon, and cultures from the seroma were sent (most likely noninfectious.)    DC planning/Dispo: Anticipate inpatient hospital stay for at least 24 to 48 hours.     Diet:  DIET NUTRITIONAL SUPPLEMENTS  DIET REGULAR  DVT ppx: Lovenox    Signed:  Valdemar Gillespie MD

## 2020-07-08 NOTE — PROCEDURES
Department of Interventional Radiology  [phone]        Interventional Radiology Brief Procedure Note    Patient: Amando Henry MRN: [id number]  SSN: [id number]    YOB: 1935  Age: 82 y.o. Sex: male      Date of Procedure: 7/8/2020    Pre-Procedure Diagnosis: Post-operative seroma. Post-Procedure Diagnosis: SAME    Procedure(s): US guided aspiration. Brief Description of Procedure: as above    Performed By: Shawna Crowell MD     Assistants: None    Anesthesia:Lidocaine    Estimated Blood Loss: None    Specimens:  Microbiology    Implants:  None    Findings: 180 cc blood tinged yellow fluid. Complications: None    Recommendations: Remove bandage tomorrow.        Follow Up: PRN    Signed By: Shawna Crowell MD     July 8, 2020

## 2020-07-08 NOTE — PROGRESS NOTES
Spoke to Dr Adam Sparks on telephone. He states he ordered IR fluid aspiration with gram stain and culture on Saturday (7/4). Procedure was never performed. It doesn't appear to have been entered into computer system. Per Dr Cyrus Park, pt still needs procedure done. Dr Laron Danielle notified. See new orders.

## 2020-07-08 NOTE — PROGRESS NOTES
Patient back from IR procedure and has a dressing to mid upper back. Patient having pain to back with a pain level of 7 and hydrocodone 5 mg given at this time for pain relief. Will assess for effectiveness.

## 2020-07-08 NOTE — PROGRESS NOTES
Problem: Patient Education: Go to Patient Education Activity  Goal: Patient/Family Education  Description: 1. Patient will complete lower body bathing and dressing with supervision/set-up and adaptive equipment as needed. 2. Patient will complete toileting with supervision. 3. Patient will tolerate 25 minutes of OT treatment with 1-2 rest breaks to increase activity tolerance for ADLs. 4. Patient will complete functional transfers with supervision and adaptive equipment as needed. 5. Patient will complete functional mobility fort household distances with CGA an adaptive equipment as needed. 6. Patient will complete self-grooming while standing edge of sink with SBA and adaptive equipment as needed. Timeframe: 7 visits      Outcome: Progressing Towards Goal      OCCUPATIONAL THERAPY: Initial Assessment, Daily Note, and AM 7/8/2020  INPATIENT: OT Visit Days: 1  [place] / [place] / Product Type: Medicare /      NAME/AGE/GENDER: Dajuan Brantley is a 82 y.o. male   PRIMARY DIAGNOSIS:  Acute respiratory failure with hypoxia ([address] [J96.01] Acute respiratory failure with hypoxia ([address] Acute respiratory failure with hypoxia ([address]        ICD-10: Treatment Diagnosis:    Generalized Muscle Weakness (M62.81)  Other lack of cordination (R27.8)   Precautions/Allergies:    Fall precautions   Lipitor [atorvastatin] and Plavix [clopidogrel]      ASSESSMENT:     Mr. Vic Neri presents for the above diagnoses. Upon arrival, pt supine in bed and agreeable to OT evaluation; currently resting on 2L 02 via n.c. Pt is alert, oriented x 4, and agreeable to OT evaluation. Pt reports living with wife in a 1-story home with 2 steps to enter.  At baseline, pt notes independence with ADLs; endorses use of brace for foot drop s/p back surgery with with lumbar fusion; states that he typically does not use brace or any DME for household mobility and MOD I for community level distances with cane.    Today, pt presents with deficits in overall activity tolerance, strength, ADL performance, and functional mobility. BUE AROM and strength are generally decreased but WFL coordination and sensation are intact. Pt transitioned to sitting edge of bed with SBA. Static and dynamic sitting balance are intact with no additional support. Pt stood and ambulated in room to bedside chair with CGA. Pt placed sitting upright chair to complete total body bathing; min A required for back management otherwise pt completed with supervision/set-up. Pt left with needs met, call light within reach, and RN notified. At this time, Brenda Lizarraga is functioning below baseline for ADLs and functional mobility. Pt would benefit from skilled OT services to address OT goals and plan of care. This section established at most recent assessment   PROBLEM LIST (Impairments causing functional limitations):  Decreased Strength  Decreased ADL/Functional Activities  Decreased Transfer Abilities  Decreased Ambulation Ability/Technique  Decreased Balance  Decreased Activity Tolerance  Decreased Pacing Skills  Increased Shortness of Breath   INTERVENTIONS PLANNED: (Benefits and precautions of occupational therapy have been discussed with the patient.)  Activities of daily living training  Adaptive equipment training  Balance training  Clothing management  Community reintergration  Donning&doffing training  Neuromuscular re-eduation  Re-evaluation  Therapeutic activity  Therapeutic exercise     TREATMENT PLAN: Frequency/Duration: Follow patient 3x/week to address above goals. Rehabilitation Potential For Stated Goals: Good     REHAB RECOMMENDATIONS (at time of discharge pending progress):    Placement: It is my opinion, based on this patient's performance to date, that Mr. Donovan may benefit from [address] after discharge due to the functional deficits listed above that are likely to improve with skilled rehabilitation because he/she has multiple medical issues that affect his/her functional mobility in the community. Equipment:   none               OCCUPATIONAL PROFILE AND HISTORY:   History of Present Injury/Illness (Reason for Referral):  See H&P  Past Medical History/Comorbidities:   Mr. Marie Mckeon  has a past medical history of BPH (benign prostatic hyperplasia), CAD (coronary artery disease) (2016), Chronic pain, GERD (gastroesophageal reflux disease), Hypertension, Kidney stone, Spinal stenosis, and Thyroid disease. He also has no past medical history of Difficult intubation, Malignant hyperthermia due to anesthesia, Nausea & vomiting, or Pseudocholinesterase deficiency. Mr. Marie Mckeon  has a past surgical history that includes hx appendectomy (1957); hx heart catheterization (,2016, 2017); neurological procedure unlisted (2001); hx lumbar fusion (2002); hx lumbar fusion (2006); hx lumbar fusion; hx cataract removal (Bilateral); and hx colonoscopy. Social History/Living Environment:   Home Environment: Private residence  # Steps to Enter: 2  One/Two Story Residence: One story  Living Alone: No  Support Systems: Spouse/Significant Other/Partner  Patient Expects to be Discharged to[de-identified] Private residence  Current DME Used/Available at Home: Brace/Splint, Sherrill Burdick, straight, Walker, rolling, Transfer bench  Prior Level of Function/Work/Activity:  Independent with ADLs and household mobility; uses brace and cane for community level distances. Personal Factors:          Sex:  male        Age:  82 y.o. Other factors that influence how disability is experienced by the patient:  multiple co-morbidities    Number of Personal Factors/Comorbidities that affect the Plan of Care: Brief history (0):  LOW COMPLEXITY   ASSESSMENT OF OCCUPATIONAL PERFORMANCE[de-identified]   Activities of Daily Living:   Basic ADLs (From Assessment) Complex ADLs (From Assessment)   Feeding: Setup  Oral Facial Hygiene/Grooming: Setup  Bathing:  Moderate assistance  Upper Body Dressing: Setup  Lower Body Dressing: Moderate assistance  Toileting: Moderate assistance Instrumental ADL  Meal Preparation: Maximum assistance  Homemaking: Maximum assistance   Grooming/Bathing/Dressing Activities of Daily Living     Cognitive Retraining  Safety/Judgement: Awareness of environment; Fall prevention                       Bed/Mat Mobility  Rolling: Stand-by assistance  Supine to Sit: Stand-by assistance  Sit to Supine: Stand-by assistance  Sit to Stand: Minimum assistance  Stand to Sit: Minimum assistance  Bed to Chair: Minimum assistance  Scooting: Stand-by assistance     Most Recent Physical Functioning:   Gross Assessment:  AROM: Generally decreased, functional  PROM: Generally decreased, functional  Strength: Generally decreased, functional  Coordination: Generally decreased, functional  Tone: Normal  Sensation: Intact               Posture:     Balance:  Sitting: Intact  Standing: Impaired  Standing - Static: Fair  Standing - Dynamic : Fair;Constant support Bed Mobility:  Rolling: Stand-by assistance  Supine to Sit: Stand-by assistance  Sit to Supine: Stand-by assistance  Scooting: Stand-by assistance  Wheelchair Mobility:     Transfers:  Sit to Stand: Minimum assistance  Stand to Sit: Minimum assistance  Bed to Chair: Minimum assistance            Patient Vitals for the past 6 hrs:   BP BP Patient Position SpO2 O2 Flow Rate (L/min) Pulse   07/08/20 0718 155/81 At rest 97 % 2 l/min 66       Mental Status  Neurologic State: Alert  Orientation Level: Oriented to person, Oriented to place, Oriented to situation, Oriented to time  Cognition: Appropriate decision making, Follows commands  Perception: Appears intact  Perseveration: No perseveration noted  Safety/Judgement: Awareness of environment, Fall prevention                          Physical Skills Involved:  Balance  Strength  Activity Tolerance  Gross Motor Control  Pain (Chronic) Cognitive Skills Affected (resulting in the inability to perform in a timely and safe manner):  none  Psychosocial Skills Affected:  Habits/Routines  Environmental Adaptation   Number of elements that affect the Plan of Care: 5+:  HIGH COMPLEXITY   CLINICAL DECISION MAKIN[address] [place] [address] AM-PAC 6 Clicks   Daily Activity Inpatient Short Form  How much help from another person does the patient currently need. .. Total A Lot A Little None   1. Putting on and taking off regular lower body clothing? [] 1   [x] 2   [] 3   [] 4   2. Bathing (including washing, rinsing, drying)? [] 1   [x] 2   [] 3   [] 4   3. Toileting, which includes using toilet, bedpan or urinal?   [] 1   [x] 2   [] 3   [] 4   4. Putting on and taking off regular upper body clothing? [] 1   [] 2   [x] 3   [] 4   5. Taking care of personal grooming such as brushing teeth? [] 1   [] 2   [x] 3   [] 4   6. Eating meals? [] 1   [] 2   [x] 3   [] 4   © , Trustees of [address] [place] [address], under license to [employer]. All rights reserved      Score:  Initial: 15 Most Recent: X (Date: -- )    Interpretation of Tool:  Represents activities that are increasingly more difficult (i.e. Bed mobility, Transfers, Gait). Medical Necessity:     Patient demonstrates   good   rehab potential due to higher previous functional level. Reason for Services/Other Comments:  Patient continues to require skilled intervention due to   medical complications and patient unable to attend/participate in therapy as expected  . Use of outcome tool(s) and clinical judgement create a POC that gives a: LOW COMPLEXITY         TREATMENT:   (In addition to Assessment/Re-Assessment sessions the following treatments were rendered)     Pre-treatment Symptoms/Complaints: \"It's been about 5 weeks since my last surgery. \"   Pain: Initial:   Pain Intensity 1: 0  Pain Location 1: Back  Pain Orientation 1: Lower, Mid  Pain Intervention(s) 1: Emotional support  Post Session:  same     Self Care: (15 minutes): Procedure(s) (per grid) utilized to improve and/or restore self-care/home management as related to dressing, bathing, and grooming. Required minimal verbal, tactile, and   cueing to facilitate activities of daily living skills. Therapeutic Activity: (    10 minutes): Therapeutic activities including Bed transfers, Chair transfers, and Ambulation on level ground to improve mobility, strength, balance, and coordination. Required minimal   to promote static and dynamic balance in standing. Braces/Orthotics/Lines/Etc:   O2 Device: Nasal cannula  Treatment/Session Assessment:    Response to Treatment:  tolerated well with no issues noted. Interdisciplinary Collaboration:   Occupational Therapist  Registered Nurse  After treatment position/precautions:   Up in chair  Bed/Chair-wheels locked  Call light within reach  RN notified   Compliance with Program/Exercises: Will assess as treatment progresses. Recommendations/Intent for next treatment session: \"Next visit will focus on advancements to more challenging activities and reduction in assistance provided\".   Total Treatment Duration:  OT Patient Time In/Time Out  Time In: [facility]  Time Out: [address], OT

## 2020-07-09 LAB
ALBUMIN SERPL-MCNC: 2.4 G/DL (ref 3.2–4.6)
ALBUMIN/GLOB SERPL: 0.7 {RATIO} (ref 1.2–3.5)
ALP SERPL-CCNC: 57 U/L (ref 50–136)
ALT SERPL-CCNC: 27 U/L (ref 12–65)
ANION GAP SERPL CALC-SCNC: 6 MMOL/L (ref 7–16)
AST SERPL-CCNC: 34 U/L (ref 15–37)
BASOPHILS # BLD: 0 K/UL (ref 0–0.2)
BASOPHILS NFR BLD: 0 % (ref 0–2)
BILIRUB SERPL-MCNC: 0.3 MG/DL (ref 0.2–1.1)
BUN SERPL-MCNC: 19 MG/DL (ref 8–23)
CALCIUM SERPL-MCNC: 8.5 MG/DL (ref 8.3–10.4)
CHLORIDE SERPL-SCNC: 105 MMOL/L (ref 98–107)
CO2 SERPL-SCNC: 29 MMOL/L (ref 21–32)
CREAT SERPL-MCNC: 0.67 MG/DL (ref 0.8–1.5)
DIFFERENTIAL METHOD BLD: ABNORMAL
EOSINOPHIL # BLD: 0 K/UL (ref 0–0.8)
EOSINOPHIL NFR BLD: 0 % (ref 0.5–7.8)
ERYTHROCYTE [DISTWIDTH] IN BLOOD BY AUTOMATED COUNT: 13.2 % (ref 11.9–14.6)
GLOBULIN SER CALC-MCNC: 3.5 G/DL (ref 2.3–3.5)
GLUCOSE SERPL-MCNC: 105 MG/DL (ref 65–100)
HCT VFR BLD AUTO: 30.8 % (ref 41.1–50.3)
HGB BLD-MCNC: 9.9 G/DL (ref 13.6–17.2)
IMM GRANULOCYTES # BLD AUTO: 0.2 K/UL (ref 0–0.5)
IMM GRANULOCYTES NFR BLD AUTO: 3 % (ref 0–5)
LYMPHOCYTES # BLD: 1.4 K/UL (ref 0.5–4.6)
LYMPHOCYTES NFR BLD: 19 % (ref 13–44)
MAGNESIUM SERPL-MCNC: 2.1 MG/DL (ref 1.8–2.4)
MCH RBC QN AUTO: 28.5 PG (ref 26.1–32.9)
MCHC RBC AUTO-ENTMCNC: 32.1 G/DL (ref 31.4–35)
MCV RBC AUTO: 88.8 FL (ref 79.6–97.8)
MONOCYTES # BLD: 0.7 K/UL (ref 0.1–1.3)
MONOCYTES NFR BLD: 10 % (ref 4–12)
NEUTS SEG # BLD: 4.9 K/UL (ref 1.7–8.2)
NEUTS SEG NFR BLD: 68 % (ref 43–78)
NRBC # BLD: 0 K/UL (ref 0–0.2)
PLATELET # BLD AUTO: 248 K/UL (ref 150–450)
PLATELET COMMENTS,PCOM: ADEQUATE
PMV BLD AUTO: 9.2 FL (ref 9.4–12.3)
POTASSIUM SERPL-SCNC: 3.5 MMOL/L (ref 3.5–5.1)
PROT SERPL-MCNC: 5.9 G/DL (ref 6.3–8.2)
RBC # BLD AUTO: 3.47 M/UL (ref 4.23–5.6)
RBC MORPH BLD: ABNORMAL
SODIUM SERPL-SCNC: 140 MMOL/L (ref 136–145)
WBC # BLD AUTO: 7.2 K/UL (ref 4.3–11.1)
WBC MORPH BLD: ABNORMAL

## 2020-07-09 PROCEDURE — 74011250636 HC RX REV CODE- 250/636: Performed by: FAMILY MEDICINE

## 2020-07-09 PROCEDURE — 74011250637 HC RX REV CODE- 250/637: Performed by: FAMILY MEDICINE

## 2020-07-09 PROCEDURE — 74011000250 HC RX REV CODE- 250: Performed by: INTERNAL MEDICINE

## 2020-07-09 PROCEDURE — 74011000258 HC RX REV CODE- 258: Performed by: INTERNAL MEDICINE

## 2020-07-09 PROCEDURE — 83735 ASSAY OF MAGNESIUM: CPT

## 2020-07-09 PROCEDURE — 80053 COMPREHEN METABOLIC PANEL: CPT

## 2020-07-09 PROCEDURE — 65270000029 HC RM PRIVATE

## 2020-07-09 PROCEDURE — 85025 COMPLETE CBC W/AUTO DIFF WBC: CPT

## 2020-07-09 RX ADMIN — GABAPENTIN 300 MG: 300 CAPSULE ORAL at 21:11

## 2020-07-09 RX ADMIN — LISINOPRIL 5 MG: 5 TABLET ORAL at 08:42

## 2020-07-09 RX ADMIN — HYDROCODONE BITARTRATE AND ACETAMINOPHEN 1 TABLET: 5; 325 TABLET ORAL at 12:42

## 2020-07-09 RX ADMIN — DOCUSATE SODIUM 200 MG: 100 CAPSULE, LIQUID FILLED ORAL at 08:43

## 2020-07-09 RX ADMIN — ENOXAPARIN SODIUM 40 MG: 40 INJECTION SUBCUTANEOUS at 08:43

## 2020-07-09 RX ADMIN — HYDROCODONE BITARTRATE AND ACETAMINOPHEN 1 TABLET: 5; 325 TABLET ORAL at 21:11

## 2020-07-09 RX ADMIN — SODIUM CHLORIDE 30 ML: 900 INJECTION, SOLUTION INTRAVENOUS at 16:50

## 2020-07-09 RX ADMIN — REMDESIVIR 100 MG: 100 INJECTION, POWDER, LYOPHILIZED, FOR SOLUTION INTRAVENOUS at 16:08

## 2020-07-09 RX ADMIN — ASPIRIN 81 MG: 81 TABLET, CHEWABLE ORAL at 08:42

## 2020-07-09 RX ADMIN — GABAPENTIN 300 MG: 300 CAPSULE ORAL at 16:08

## 2020-07-09 RX ADMIN — HYDROCODONE BITARTRATE AND ACETAMINOPHEN 1 TABLET: 5; 325 TABLET ORAL at 05:26

## 2020-07-09 RX ADMIN — GABAPENTIN 300 MG: 300 CAPSULE ORAL at 08:42

## 2020-07-09 RX ADMIN — PANTOPRAZOLE SODIUM 40 MG: 40 TABLET, DELAYED RELEASE ORAL at 05:26

## 2020-07-09 RX ADMIN — Medication 10 ML: at 05:26

## 2020-07-09 RX ADMIN — DOXAZOSIN 2 MG: 1 TABLET ORAL at 08:42

## 2020-07-09 RX ADMIN — Medication 10 ML: at 21:12

## 2020-07-09 RX ADMIN — DEXAMETHASONE 6 MG: 4 TABLET ORAL at 08:42

## 2020-07-09 RX ADMIN — Medication 10 ML: at 13:14

## 2020-07-09 RX ADMIN — LEVOTHYROXINE SODIUM 100 MCG: 0.1 TABLET ORAL at 05:26

## 2020-07-09 NOTE — PROGRESS NOTES
Spine    AF,VSS  Patient improving per primary team  Wound seroma aspiration with negative Gram Stain and Cultures negative at 24 hours  Await final culture results, continue current care

## 2020-07-09 NOTE — PROGRESS NOTES
Physical Therapy Note:    Attempted to see patient for physical therapy treatment session. Patient politely requesting to defer PT as he is hoping to nap at this time. RN aware. Will follow and re-attempt as schedule permits/patient available.     Thank you,  Berenice Isidro, PT, DPT

## 2020-07-09 NOTE — PROGRESS NOTES
Patient resting. No complaints during shift. Back pain relieved by [place]. Preparing to give report to oncoming nurse.

## 2020-07-09 NOTE — PROGRESS NOTES
Hospitalist Note     Admit Date:  7/3/2020  1:42 PM   Name:  Annel Donovna   Age:  82 y.o.  :  1935   MRN:  [id number]   PCP:  Mary Olivares MD  Treatment Team: Attending Provider: Torres Booker MD; Consulting Provider: Lupillo Leonardo NP; Utilization Review: Tariq Peralta RN; Care Manager: Jorgito Randall RN; Consulting Provider: Richmond Hernadez MD; Physical Therapist: Magan Schneider, PT; Primary Nurse: Leatha Berg    HPI/Subjective:   Mr. Mauricio Hoang QH 89 y. o. male with a past medical history of CAD, HTN, hypthyroidism and recent back surgery who presented on 7/3 with a 1 week history of chills. He also has had difficulty swallowing and eating per his wife's report. COVID +. On 2L O2. Started on dexamethasone; plasma ; started on Remdesivir . MRI T/L spine done and r/o surgical complication. : In bed, comfortable on 2L NC O2. Remdesivir started today; also getting plasma. Ambulated to  with nurse, no major limitations. I updated his wife via phone this afternoon.  patient is doing well this morning. On 2 L oxygen by nasal cannula. shortness of breath improved. No fever no chills.  Pt reports feeling better. No fever, on 2L Oxygen by NC.      patient reports feeling weak today. Afebrile. Currently on 2 L oxygen. No chest pain, shortness of breath improved.   Objective:     Patient Vitals for the past 24 hrs:   Temp Pulse Resp BP SpO2   20 1118 97.5 °F (36.4 °C) 60 20 126/67 95 %   20 0719 98 °F (36.7 °C) 61 19 144/77 95 %   20 0407 98.4 °F (36.9 °C) 70 19 128/70 96 %   20 2320 98.2 °F (36.8 °C) 68 20 122/69 94 %   20 2046 98.3 °F (36.8 °C) 67 19 128/70 93 %   20 1454 97.9 °F (36.6 °C) 75 20 135/67 97 %     Oxygen Therapy  O2 Sat (%): 95 % (20 1118)  Pulse via Oximetry: 92 beats per minute (20 0116)  O2 Device: Nasal cannula (20 0526)  O2 Flow Rate (L/min): 2 l/min (20 7013)    Estimated body mass index is 24.41 kg/m² as calculated from the following:    Height as of this encounter: 6' (1.829 m). Weight as of this encounter: 81.6 kg (180 lb). Intake/Output Summary (Last 24 hours) at 7/9/2020 1408  Last data filed at 7/9/2020 1407  Gross per 24 hour   Intake 960 ml   Output 1350 ml   Net -390 ml       *Note that automatically entered I/Os may not be accurate; dependent on patient compliance with collection and accurate  by [employer]. General:    Well nourished. Alert, tachypneic, increased work of breathing, accessory muscle use, unable to speak in full sentences,   CV:   RRR. No murmur, rub, or gallop. Lungs:   Bilateral basilar crackles. No wheezing, 2L NC O2. Abdomen:   Soft, nontender, nondistended. Extremities: Warm and dry. No cyanosis or edema. Skin:     No rashes or jaundice. Large, well healing surgical incision on his back. Neuro:  No gross focal deficits    Data Reviewed:  I have reviewed all labs, meds, and studies from the last 24 hours:  Recent Results (from the past 24 hour(s))   CBC WITH AUTOMATED DIFF    Collection Time: 07/09/20  4:35 AM   Result Value Ref Range    WBC 7.2 4.3 - 11.1 K/uL    RBC 3.47 (L) 4.23 - 5.6 M/uL    HGB 9.9 (L) 13.6 - 17.2 g/dL    HCT 30.8 (L) 41.1 - 50.3 %    MCV 88.8 79.6 - 97.8 FL    MCH 28.5 26.1 - 32.9 PG    MCHC 32.1 31.4 - 35.0 g/dL    RDW 13.2 11.9 - 14.6 %    PLATELET 199 118 - 152 K/uL    MPV 9.2 (L) 9.4 - 12.3 FL    ABSOLUTE NRBC 0.00 0.0 - 0.2 K/uL    NEUTROPHILS 68 43 - 78 %    LYMPHOCYTES 19 13 - 44 %    MONOCYTES 10 4.0 - 12.0 %    EOSINOPHILS 0 (L) 0.5 - 7.8 %    BASOPHILS 0 0.0 - 2.0 %    IMMATURE GRANULOCYTES 3 0.0 - 5.0 %    ABS. NEUTROPHILS 4.9 1.7 - 8.2 K/UL    ABS. LYMPHOCYTES 1.4 0.5 - 4.6 K/UL    ABS. MONOCYTES 0.7 0.1 - 1.3 K/UL    ABS. EOSINOPHILS 0.0 0.0 - 0.8 K/UL    ABS. BASOPHILS 0.0 0.0 - 0.2 K/UL    ABS. IMM.  GRANS. 0.2 0.0 - 0.5 K/UL    RBC COMMENTS NORMOCYTIC/NORMOCHROMIC WBC COMMENTS Result Confirmed By Smear      PLATELET COMMENTS ADEQUATE      DF AUTOMATED     METABOLIC PANEL, COMPREHENSIVE    Collection Time: 07/09/20  4:35 AM   Result Value Ref Range    Sodium 140 136 - 145 mmol/L    Potassium 3.5 3.5 - 5.1 mmol/L    Chloride 105 98 - 107 mmol/L    CO2 29 21 - 32 mmol/L    Anion gap 6 (L) 7 - 16 mmol/L    Glucose 105 (H) 65 - 100 mg/dL    BUN 19 8 - 23 MG/DL    Creatinine 0.67 (L) 0.8 - 1.5 MG/DL    GFR est AA >60 >60 ml/min/1.73m2    GFR est non-AA >60 >60 ml/min/1.73m2    Calcium 8.5 8.3 - 10.4 MG/DL    Bilirubin, total 0.3 0.2 - 1.1 MG/DL    ALT (SGPT) 27 12 - 65 U/L    AST (SGOT) 34 15 - 37 U/L    Alk.  phosphatase 57 50 - 136 U/L    Protein, total 5.9 (L) 6.3 - 8.2 g/dL    Albumin 2.4 (L) 3.2 - 4.6 g/dL    Globulin 3.5 2.3 - 3.5 g/dL    A-G Ratio 0.7 (L) 1.2 - 3.5     MAGNESIUM    Collection Time: 07/09/20  4:35 AM   Result Value Ref Range    Magnesium 2.1 1.8 - 2.4 mg/dL        Current Meds:  Current Facility-Administered Medications   Medication Dose Route Frequency    sodium chloride 0.9 % bolus infusion 30 mL  30 mL IntraVENous DAILY    remdesivir 100 mg in 0.9% sodium chloride 250 mL IVPB  100 mg IntraVENous Q24H    dexAMETHasone (DECADRON) tablet 6 mg  6 mg Oral DAILY    0.9% sodium chloride infusion 250 mL  250 mL IntraVENous PRN    aspirin chewable tablet 81 mg  81 mg Oral DAILY    docusate sodium (COLACE) capsule 200 mg  200 mg Oral DAILY    doxazosin (CARDURA) tablet 2 mg  2 mg Oral DAILY    pantoprazole (PROTONIX) tablet 40 mg  40 mg Oral ACB    gabapentin (NEURONTIN) capsule 300 mg  300 mg Oral TID    levothyroxine (SYNTHROID) tablet 100 mcg  100 mcg Oral ACB    lisinopriL (PRINIVIL, ZESTRIL) tablet 5 mg  5 mg Oral DAILY    traMADoL (ULTRAM) tablet 50 mg  50 mg Oral Q6H PRN    sodium chloride (NS) flush 5-40 mL  5-40 mL IntraVENous Q8H    sodium chloride (NS) flush 5-40 mL  5-40 mL IntraVENous PRN    acetaminophen (TYLENOL) tablet 650 mg  650 mg Oral Q4H PRN    ondansetron (ZOFRAN) injection 4 mg  4 mg IntraVENous Q4H PRN    magnesium hydroxide (MILK OF MAGNESIA) 400 mg/5 mL oral suspension 30 mL  30 mL Oral DAILY PRN    enoxaparin (LOVENOX) injection 40 mg  40 mg SubCUTAneous Q24H    HYDROcodone-acetaminophen (NORCO) 5-325 mg per tablet 1 Tab  1 Tab Oral Q6H PRN       Other Studies:  No results found for this visit on 07/03/20. No results found.     All Micro Results     Procedure Component Value Units Date/Time    CULTURE, BODY FLUID Marget Pennant STAIN [[id number]] Collected:  07/08/20 1341    Order Status:  Completed Specimen:  Abscess Updated:  07/09/20 1107     Special Requests: NO SPECIAL REQUESTS        GRAM STAIN 0 TO 3 WBC'S SEEN PER OIF      NO DEFINITE ORGANISM SEEN        Culture result: NO GROWTH 1 DAY       CULTURE, BLOOD [[id number]] Collected:  07/06/20 0040    Order Status:  Completed Specimen:  Blood Updated:  07/09/20 0822     Special Requests: --        RIGHT  FOREARM       Culture result: NO GROWTH 3 DAYS       CULTURE, BLOOD [[id number]] Collected:  07/06/20 0040    Order Status:  Completed Specimen:  Blood Updated:  07/09/20 0822     Special Requests: RIGHT ANTECUBITAL        Culture result: NO GROWTH 3 DAYS             SARS-CoV-2 Lab Results  \"Novel Coronavirus\" Test: No results found for: COV2NT   \"Emergent Disease\" Test: No results found for: EDPR  \"SARS-COV-2\" Test: No results found for: XGCOVT  As of: 3:52 PM on 7/9/2020          Assessment and Plan:     Hospital Problems as of 7/9/2020 Date Reviewed: 5/22/2015          Codes Class Noted - Resolved POA    Essential hypertension ICD-10-CM: I10  ICD-9-CM: 401.9  7/4/2020 - Present Unknown        COVID-19 virus infection ICD-10-CM: U07.1  ICD-9-CM: 079.89  7/4/2020 - Present Unknown        * (Principal) Acute respiratory failure with hypoxia ([facility] [address] ICD-10-CM: J96.01  ICD-9-CM: 518.81  7/3/2020 - Present Yes        Fever ICD-10-CM: R50.9  ICD-9-CM: 780.60  7/3/2020 - Present Yes Suspected COVID-19 virus infection ICD-10-CM: Z20.828  ICD-9-CM: V01.79  7/3/2020 - Present Yes        GERD (gastroesophageal reflux disease) ICD-10-CM: K21.9  ICD-9-CM: 530.81  Unknown - Present Yes    Overview Signed 1/24/2012  9:53 AM by Trino Holley MD     controlled with otc meds             Thyroid disease ICD-10-CM: E07.9  ICD-9-CM: 246. 9  Unknown - Present Yes    Overview Signed 1/24/2012  9:53 AM by Trino Holley MD     hypothyroidism                   Plan:  # Acute hypoxemic respiratory failure POA  Currently on 2L O2 by NC. Wean Oxygen as tolerated. Sepsis 2/2 COVID-19 POA   - Sepsis resolved. - Dexamethasone; s/p convalescent plasma 7/6   - Remdesivir started 7/6    # Hypothyroidism   - Synthroid    # HTN   - Home meds    # H/o thoracolumbar spinal stenosis   - S/p surgery last month. - MRI thoracolumbar spine on admission most likely postoperative seroma. Interventional radiology consulted per orthopedic spine surgery Dr. Mitchell Hong. IR drainage 07/08 and cultures from the seroma so far negative. DC planning/Dispo: Anticipate discharge home in 24 hours if cultures negative. Home oxygen screen prior to discharge.     Diet:  DIET NUTRITIONAL SUPPLEMENTS  DIET REGULAR  DVT ppx: Lovenox    Signed:  Americo Galvez MD

## 2020-07-09 NOTE — PROGRESS NOTES
Assumed care at [address] [place] [address]. A&O4. Resting in bed at this time. Denies pain. No s/sx of distress at this time. Droplet plus isolation due to positive COVID 19 test result and proper PPE worn. Call light within reach and is encouraged to call for assistance if needed. Will continue to monitor.

## 2020-07-09 NOTE — PROGRESS NOTES
Received bedside shift report from Manan Owens RN. Pt lying in bed. No apparent distress. Respirations even and unlabored. Instructed to call for assistance with needs, as they arise. Pt voiced understanding.

## 2020-07-10 VITALS
BODY MASS INDEX: 24.38 KG/M2 | WEIGHT: 180 LBS | HEIGHT: 72 IN | SYSTOLIC BLOOD PRESSURE: 132 MMHG | DIASTOLIC BLOOD PRESSURE: 71 MMHG | HEART RATE: 85 BPM | RESPIRATION RATE: 18 BRPM | TEMPERATURE: 98.8 F | OXYGEN SATURATION: 96 %

## 2020-07-10 LAB
ALBUMIN SERPL-MCNC: 2.5 G/DL (ref 3.2–4.6)
ALBUMIN/GLOB SERPL: 0.8 {RATIO} (ref 1.2–3.5)
ALP SERPL-CCNC: 59 U/L (ref 50–136)
ALT SERPL-CCNC: 49 U/L (ref 12–65)
ANION GAP SERPL CALC-SCNC: 5 MMOL/L (ref 7–16)
AST SERPL-CCNC: 45 U/L (ref 15–37)
BACTERIA SPEC CULT: NORMAL
BASOPHILS # BLD: 0 K/UL (ref 0–0.2)
BASOPHILS NFR BLD: 0 % (ref 0–2)
BILIRUB SERPL-MCNC: 0.3 MG/DL (ref 0.2–1.1)
BUN SERPL-MCNC: 17 MG/DL (ref 8–23)
CALCIUM SERPL-MCNC: 8.3 MG/DL (ref 8.3–10.4)
CHLORIDE SERPL-SCNC: 107 MMOL/L (ref 98–107)
CO2 SERPL-SCNC: 31 MMOL/L (ref 21–32)
CREAT SERPL-MCNC: 0.75 MG/DL (ref 0.8–1.5)
DIFFERENTIAL METHOD BLD: ABNORMAL
EOSINOPHIL # BLD: 0 K/UL (ref 0–0.8)
EOSINOPHIL NFR BLD: 0 % (ref 0.5–7.8)
ERYTHROCYTE [DISTWIDTH] IN BLOOD BY AUTOMATED COUNT: 13.3 % (ref 11.9–14.6)
GLOBULIN SER CALC-MCNC: 3.3 G/DL (ref 2.3–3.5)
GLUCOSE SERPL-MCNC: 98 MG/DL (ref 65–100)
GRAM STN SPEC: NORMAL
GRAM STN SPEC: NORMAL
HCT VFR BLD AUTO: 30.1 % (ref 41.1–50.3)
HGB BLD-MCNC: 10.3 G/DL (ref 13.6–17.2)
IMM GRANULOCYTES # BLD AUTO: 0.4 K/UL (ref 0–0.5)
IMM GRANULOCYTES NFR BLD AUTO: 5 % (ref 0–5)
LYMPHOCYTES # BLD: 1.4 K/UL (ref 0.5–4.6)
LYMPHOCYTES NFR BLD: 20 % (ref 13–44)
MCH RBC QN AUTO: 30.1 PG (ref 26.1–32.9)
MCHC RBC AUTO-ENTMCNC: 34.2 G/DL (ref 31.4–35)
MCV RBC AUTO: 88 FL (ref 79.6–97.8)
MONOCYTES # BLD: 0.7 K/UL (ref 0.1–1.3)
MONOCYTES NFR BLD: 10 % (ref 4–12)
NEUTS SEG # BLD: 4.5 K/UL (ref 1.7–8.2)
NEUTS SEG NFR BLD: 65 % (ref 43–78)
NRBC # BLD: 0 K/UL (ref 0–0.2)
PLATELET # BLD AUTO: 279 K/UL (ref 150–450)
PLATELET COMMENTS,PCOM: ADEQUATE
PMV BLD AUTO: 9.3 FL (ref 9.4–12.3)
POTASSIUM SERPL-SCNC: 3.7 MMOL/L (ref 3.5–5.1)
PROT SERPL-MCNC: 5.8 G/DL (ref 6.3–8.2)
RBC # BLD AUTO: 3.42 M/UL (ref 4.23–5.6)
RBC MORPH BLD: ABNORMAL
RBC MORPH BLD: ABNORMAL
SERVICE CMNT-IMP: NORMAL
SODIUM SERPL-SCNC: 143 MMOL/L (ref 136–145)
WBC # BLD AUTO: 7 K/UL (ref 4.3–11.1)
WBC MORPH BLD: ABNORMAL

## 2020-07-10 PROCEDURE — 74011000250 HC RX REV CODE- 250: Performed by: INTERNAL MEDICINE

## 2020-07-10 PROCEDURE — 74011000258 HC RX REV CODE- 258: Performed by: INTERNAL MEDICINE

## 2020-07-10 PROCEDURE — 97110 THERAPEUTIC EXERCISES: CPT | Performed by: PHYSICAL THERAPIST

## 2020-07-10 PROCEDURE — 74011250637 HC RX REV CODE- 250/637: Performed by: FAMILY MEDICINE

## 2020-07-10 PROCEDURE — 80053 COMPREHEN METABOLIC PANEL: CPT

## 2020-07-10 PROCEDURE — 85025 COMPLETE CBC W/AUTO DIFF WBC: CPT

## 2020-07-10 PROCEDURE — 74011250636 HC RX REV CODE- 250/636: Performed by: FAMILY MEDICINE

## 2020-07-10 RX ORDER — DEXAMETHASONE 4 MG/1
4 TABLET ORAL
Qty: 4 TAB | Refills: 0 | Status: SHIPPED | OUTPATIENT
Start: 2020-07-10 | End: 2020-07-14

## 2020-07-10 RX ADMIN — DOXAZOSIN 2 MG: 1 TABLET ORAL at 09:58

## 2020-07-10 RX ADMIN — SODIUM CHLORIDE 30 ML: 900 INJECTION, SOLUTION INTRAVENOUS at 15:44

## 2020-07-10 RX ADMIN — LEVOTHYROXINE SODIUM 100 MCG: 0.1 TABLET ORAL at 05:32

## 2020-07-10 RX ADMIN — DEXAMETHASONE 6 MG: 4 TABLET ORAL at 09:59

## 2020-07-10 RX ADMIN — GABAPENTIN 300 MG: 300 CAPSULE ORAL at 09:59

## 2020-07-10 RX ADMIN — PANTOPRAZOLE SODIUM 40 MG: 40 TABLET, DELAYED RELEASE ORAL at 05:33

## 2020-07-10 RX ADMIN — SODIUM CHLORIDE 250 ML: 9 INJECTION, SOLUTION INTRAVENOUS at 15:45

## 2020-07-10 RX ADMIN — LISINOPRIL 5 MG: 5 TABLET ORAL at 09:59

## 2020-07-10 RX ADMIN — ASPIRIN 81 MG: 81 TABLET, CHEWABLE ORAL at 09:59

## 2020-07-10 RX ADMIN — Medication 10 ML: at 14:44

## 2020-07-10 RX ADMIN — ENOXAPARIN SODIUM 40 MG: 40 INJECTION SUBCUTANEOUS at 09:58

## 2020-07-10 RX ADMIN — HYDROCODONE BITARTRATE AND ACETAMINOPHEN 1 TABLET: 5; 325 TABLET ORAL at 11:34

## 2020-07-10 RX ADMIN — Medication 10 ML: at 05:32

## 2020-07-10 RX ADMIN — REMDESIVIR 100 MG: 100 INJECTION, POWDER, LYOPHILIZED, FOR SOLUTION INTRAVENOUS at 15:46

## 2020-07-10 RX ADMIN — DOCUSATE SODIUM 200 MG: 100 CAPSULE, LIQUID FILLED ORAL at 09:59

## 2020-07-10 RX ADMIN — GABAPENTIN 300 MG: 300 CAPSULE ORAL at 15:45

## 2020-07-10 NOTE — PROGRESS NOTES
Problem: Mobility Impaired (Adult and Pediatric)  Goal: *Therapy Goal (Edit Goal, Insert Text)  Outcome: Progressing Towards Goal  Note: STG:  (1.)Mr. Donovan will move from supine to sit and sit to supine , scoot up and down, and roll side to side with STAND BY ASSIST within 4 treatment day(s). (2.)Mr. Donovan will transfer from bed to chair and chair to bed with STAND BY ASSIST using the least restrictive device within 4 treatment day(s). (3.)Mr. Donovan will ambulate with STAND BY ASSIST for 100 feet with the least restrictive device within 4 treatment day(s). LTG:  (1.)Mr. Donovan will move from supine to sit and sit to supine , scoot up and down, and roll side to side in bed with MODIFIED INDEPENDENCE within 7 treatment day(s). (2.)Mr. Donovan will transfer from bed to chair and chair to bed with MODIFIED INDEPENDENCE using the least restrictive device within 7 treatment day(s). (3.)Mr. Donovan will ambulate with MODIFIED INDEPENDENCE for 200 feet with the least restrictive device within 7 treatment day(s). ________________________________________________________________________________________________       PHYSICAL THERAPY: Daily Note and PM 7/10/2020  INPATIENT: PT Visit Days : 2  [place] / [place] / Product Type: Medicare /       NAME/AGE/GENDER: Felipa Mcdaniels is a 82 y.o. male   PRIMARY DIAGNOSIS: Acute respiratory failure with hypoxia ([address] [J96.01] Acute respiratory failure with hypoxia ([address] Acute respiratory failure with hypoxia ([facility] [address]       ICD-10: Treatment Diagnosis:    · Generalized Muscle Weakness (M62.81)  · Other lack of cordination (R27.8)  · Difficulty in walking, Not elsewhere classified (R26.2)  · Other abnormalities of gait and mobility (R26.89)   Precaution/Allergies:  Lipitor [atorvastatin] and Plavix [clopidogrel]      ASSESSMENT:     7/10:   Upon entering, Pnt is agreeable to PT treatment and up in bedside chair.   he reports no pain at rest. Pnt performed Sit > stand with RW using CGA. Gait 6 x 15 ft with CGA, cues for step clearance. Gait is noted to be slow but steadier. Stand > sit with CGA, followed by positioning for comfort. At end of session pt up in bedside chair with all needs within reach, alarm activated for safety, RN notified. Overall, great progress today as pnt improved in ambulation distance and tolerance. Pnt continues to present as functioning below his baseline, with deficits in mobility including transfers, gait, balance, and activity tolerance. Pt will continue to benefit from skilled therapy services to address stated deficits to promote return to highest level of function, independence, and safety. Will continue to follow. This section established at most recent assessment   PROBLEM LIST (Impairments causing functional limitations):  1. Decreased Strength  2. Decreased Transfer Abilities  3. Decreased Ambulation Ability/Technique  4. Decreased Balance  5. Decreased Activity Tolerance   INTERVENTIONS PLANNED: (Benefits and precautions of physical therapy have been discussed with the patient.)  1. Balance Exercise  2. Therapeutic Activites  3. Therapeutic Exercise/Strengthening  4. Transfer Training     TREATMENT PLAN: Frequency/Duration: 4 times a week for duration of hospital stay  Rehabilitation Potential For Stated Goals: Good     REHAB RECOMMENDATIONS (at time of discharge pending progress):    Placement: It is my opinion, based on this patient's performance to date, that Mr. Donovan may benefit from [address] after discharge due to the functional deficits listed above that are likely to improve with skilled rehabilitation because he/she has multiple medical issues that affect his/her functional mobility in the community.   Equipment:    None at this time              HISTORY:   History of Present Injury/Illness (Reason for Referral):  Rosa Junior is an 82 y.o. male with a past medical history of CAD, HTN, hypthyroidism who presents to the ER with report of chills for the the past week or so. He also has had difficulty swallowing and eating per his wife's report. Denies any SOB, cough, or pain. REVIEW OF SYSTEMS: Comprehensive ROS performed. Denies nausea, vomiting, admits to fevers, chills, malaise, weakness, otherwise negative. Past Medical History/Comorbidities:   Mr. Taras Brannon  has a past medical history of BPH (benign prostatic hyperplasia), CAD (coronary artery disease) (2016), Chronic pain, GERD (gastroesophageal reflux disease), Hypertension, Kidney stone, Spinal stenosis, and Thyroid disease. He also has no past medical history of Difficult intubation, Malignant hyperthermia due to anesthesia, Nausea & vomiting, or Pseudocholinesterase deficiency. Mr. Taras Brannon  has a past surgical history that includes hx appendectomy (1957); hx heart catheterization (,2016, 2017); neurological procedure unlisted (2001); hx lumbar fusion (2002); hx lumbar fusion (2006); hx lumbar fusion; hx cataract removal (Bilateral); hx colonoscopy; and ir fine needle aspiration w image (7/8/2020). Social History/Living Environment:   Home Environment: Private residence  # Steps to Enter: 2  One/Two Story Residence: One story  Living Alone: No  Support Systems: Spouse/Significant Other/Partner  Patient Expects to be Discharged to[de-identified] Private residence  Current DME Used/Available at Home: Brace/Splint, [place], straight, Walker, rolling, Transfer bench  Prior Level of Function/Work/Activity:  Limited by footdrop left LE with prior lumbar surgery. Dominant Side:         RIGHT    Personal Factors:          Sex:  male        Age:  82 y.o.    Number of Personal Factors/Comorbidities that affect the Plan of Care: 0: LOW COMPLEXITY   EXAMINATION:   Most Recent Physical Functioning:   Gross Assessment:                  Posture:  Posture (WDL): Exceptions to WDL  Posture Assessment: Cervical, Forward head  Balance:  Sitting: Impaired  Sitting - Static: Good (unsupported)  Sitting - Dynamic: Fair (occasional)  Standing: Impaired  Standing - Static: Fair  Standing - Dynamic : Fair Bed Mobility:     Wheelchair Mobility:     Transfers:  Sit to Stand: Contact guard assistance  Stand to Sit: Contact guard assistance  Gait:     Base of Support: Center of gravity altered  Speed/Gilda: Delayed; Fluctuations  Step Length: Right shortened;Left shortened  Swing Pattern: Left asymmetrical;Right asymmetrical  Stance: Left decreased;Right decreased  Gait Abnormalities: Decreased step clearance  Distance (ft): 90 Feet (ft)  Ambulation - Level of Assistance: Contact guard assistance      Body Structures Involved:  1. Bones  2. Joints  3. Muscles  4. Ligaments Body Functions Affected:  1. Neuromusculoskeletal  2. Movement Related  3. Skin Related Activities and Participation Affected:  1. General Tasks and Demands  2. Communication  3. Mobility  4. Self Care  5. Community, Social and [place]   Number of elements that affect the Plan of Care: 1-2: LOW COMPLEXITY   CLINICAL PRESENTATION:   Presentation: Stable and uncomplicated: LOW COMPLEXITY   CLINICAL DECISION MAKIN [facility] Inpatient Short Form  How much difficulty does the patient currently have. .. Unable A Lot A Little None   1. Turning over in bed (including adjusting bedclothes, sheets and blankets)? [] 1   [] 2   [x] 3   [] 4   2. Sitting down on and standing up from a chair with arms ( e.g., wheelchair, bedside commode, etc.)   [] 1   [] 2   [x] 3   [] 4   3. Moving from lying on back to sitting on the side of the bed? [] 1   [] 2   [x] 3   [] 4   How much help from another person does the patient currently need. .. Total A Lot A Little None   4. Moving to and from a bed to a chair (including a wheelchair)? [] 1   [] 2   [x] 3   [] 4   5. Need to walk in hospital room? [] 1   [] 2   [x] 3   [] 4   6.   Climbing 3-5 steps with a railing? [] 1   [] 2   [x] 3   [] 4   © 2007, Trustees of [address] [place] [address] [address] [address], under license to [employer]. All rights reserved      Score:  Initial: 18 Most Recent: X (Date: -- )    Interpretation of Tool:  Represents activities that are increasingly more difficult (i.e. Bed mobility, Transfers, Gait). Medical Necessity:     · Patient demonstrates   · good  ·  rehab potential due to higher previous functional level. Reason for Services/Other Comments:  · Patient continues to require skilled intervention due to   · medical complications and decreased lumbar and mobility with left LE foot drop. · .   Use of outcome tool(s) and clinical judgement create a POC that gives a: Clear prediction of patient's progress: LOW COMPLEXITY            TREATMENT:   (In addition to Assessment/Re-Assessment sessions the following treatments were rendered)   Pre-treatment Symptoms/Complaints: \"Nice to meet you\"   Pain: Initial:      Post Session:  0/10 no pain reported. Therapeutic Activity: (    ):  Therapeutic activities including Bed transfers, Chair transfers, and Ambulation on level ground to improve mobility, strength, balance, and coordination. Required minimal   to promote coordination of bilateral, lower extremity(s) and promote motor control of bilateral, lower extremity(s). Therapeutic Exercise: (27 Minutes):  Exercises per grid below to improve mobility, strength, balance and coordination. Required minimal visual, verbal and manual cues to promote proper body alignment, promote proper body posture and promote proper body mechanics. Progressed repetitions as indicated up to 6 x 15 ft w/ seated rests in the bedside chair between laps. Braces/Orthotics/Lines/Etc:   · cruz catheter  · O2 Device: Nasal cannula  Treatment/Session Assessment:    · Response to Treatment:  improved transfers, ambulation and mobility. Is able to maintain foot clearance for short distances.        · Interdisciplinary Collaboration:   o Physical Therapist  o Registered Nurse  · After treatment position/precautions:   o Up in chair  o Call light within reach   · Compliance with Program/Exercises: Will assess as treatment progresses  · Recommendations/Intent for next treatment session: \"Next visit will focus on advancements to more challenging activities, reduction in assistance provided, and transfers, ambulation and increased mobility. \".   Total Treatment Duration:  PT Patient Time In/Time Out  Time In: 1419  Time Out: [address]

## 2020-07-10 NOTE — PROGRESS NOTES
Norco effective.      07/09/20 1679   Pain 1   Pain Scale 1 Numeric (0 - 10)   Pain Intensity 1 2   Pain Reassessment 1 Yes

## 2020-07-10 NOTE — PROGRESS NOTES
Discharge instructions and paperwork reviewed with patient. Meds reviewed. Pt voices understanding. Paper prescription sent home with pt. No signature obtained due to positive COVID test. IV removed. Pt to be discharged when ride arrives.

## 2020-07-10 NOTE — PROGRESS NOTES
Norco 5 mg po given.      07/09/20 2117   Pain 1   Pain Scale 1 Numeric (0 - 10)   Pain Intensity 1 7   Patient Stated Pain Goal 0

## 2020-07-10 NOTE — PROGRESS NOTES
Pt sitting up in chair. No s/sx of distress noted. Denies pain. Encouraged to call for assistance as needed. Call light within reach. Will continue monitor.

## 2020-07-10 NOTE — PROGRESS NOTES
Pt requests Norco but denies any pain. Pt stated he feels like he \"needs\" it or he will start to \"withdrawal and start jerking\". Norco 5 given. Will monitor.

## 2020-07-10 NOTE — PROGRESS NOTES
CM spoke with pt and pt is in agreement with home health services at discharge. [facility] SN, PT and OT services ordered and referral sent. Please consult  if any new issues arise  Case management will continue to follow.

## 2020-07-10 NOTE — PROGRESS NOTES
Pt being taken to patient discharge area via wheelchair with mask in place. To be discharged home with wife.

## 2020-07-10 NOTE — PROGRESS NOTES
Problem: Falls - Risk of  Goal: *Absence of Falls  Description: Document Sandra Sulaimancynthia Fall Risk and appropriate interventions in the flowsheet.   Outcome: Progressing Towards Goal  Note: Fall Risk Interventions:  Mobility Interventions: Patient to call before getting OOB         Medication Interventions: Teach patient to arise slowly    Elimination Interventions: Call light in reach    History of Falls Interventions: Evaluate medications/consider consulting pharmacy         Problem: Patient Education: Go to Patient Education Activity  Goal: Patient/Family Education  Outcome: Progressing Towards Goal

## 2020-07-10 NOTE — PROGRESS NOTES
Oxygen Qualifier       Room air: SpO2 with O2 and liter flow   Resting SpO2  95%  98% on 2L   Ambulating SpO2  93%          Completed by:    Colleen Sosa

## 2020-07-10 NOTE — PROGRESS NOTES
Pt resting in bed watching tv. Alert and oriented times 4 at this time. Pt on 2L NC with no s/sx of distress noted. Denies any pain at this time. Encouraged to call for assistance. Call light within reach. Will monitor.

## 2020-07-10 NOTE — DISCHARGE SUMMARY
Hospitalist Discharge Summary     Admit Date:  7/3/2020  1:42 PM   Name:  Yaneli Donovan   Age:  82 y.o.  :  1935   MRN:  [id number]   PCP:  Jordyn Mallory MD  Treatment Team: Attending Provider: Jed Caldwell MD; Consulting Provider: Felix Aguero NP; Utilization Review: Rita Vargas RN; Care Manager: Toyin Bird RN; Consulting Provider: Dave Chan MD; Care Manager: Marsha Trinh Primary Nurse: Tracy Curran RN; Primary Nurse: Vanessa Pollock RN    Problem List for this Hospitalization:  Hospital Problems as of 7/10/2020 Date Reviewed: 2015          Codes Class Noted - Resolved POA    Essential hypertension ICD-10-CM: I10  ICD-9-CM: 401.9  2020 - Present Unknown        COVID-19 virus infection ICD-10-CM: U07.1  ICD-9-CM: 079.89  2020 - Present Unknown        * (Principal) Acute respiratory failure with hypoxia ([facility] [address] ICD-10-CM: J96.01  ICD-9-CM: 518.81  7/3/2020 - Present Yes        Fever ICD-10-CM: R50.9  ICD-9-CM: 780.60  7/3/2020 - Present Yes        Suspected COVID-19 virus infection ICD-10-CM: Z20.828  ICD-9-CM: V01.79  7/3/2020 - Present Yes        GERD (gastroesophageal reflux disease) ICD-10-CM: K21.9  ICD-9-CM: 530.81  Unknown - Present Yes    Overview Signed 2012  9:53 AM by Cory Timmons MD     controlled with otc meds             Thyroid disease ICD-10-CM: E07.9  ICD-9-CM: 246. 9  Unknown - Present Yes    Overview Signed 2012  9:53 AM by Cory Timmons MD     hypothyroidism                     Admission HPI from 7/3/2020: This is a 82 y. o. male with a past medical history of CAD, HTN, hypthyroidism andt recent back surgery who presented on 7/3 with a 1 week history of chills. He also has had difficulty swallowing and eating per his wife's report. COVID +. On 2L O2. Started on dexamethasone; plasma ; started on Remdesivir . MRI T/L spine done and r/o surgical complication. Hospital Course:     This 72-year-old male with    Acute hypoxemic respiratory failure present on admission from COVID-19 infection present on admission  Sepsis present on admission secondary to COVID-19 infection POA sepsis resolved  Hypothyroidism  Hypertension  History of thoracolumbar spinal stenosis status post surgery with no signs of postoperative infection with cultures negative. Patient was admitted for acute hypoxemic respiratory failure admission from COVID-19 infection. He was treated with convalescent plasma on 7/6. Also treated with dexamethasone and Remdesivir. He also had MRI of the thoracolumbar spine which showed findings concerning for postoperative seroma. Interventional radiology was consulted for orthopedic spine surgery. IR drainage was done on 7/8 cultures from the seroma were negative. Patient was afebrile. He had home oxygen screen done on discharge and did not qualify for any oxygen. He is discharged home with home health services in a stable condition. Disposition: Home Health Care [facility] [place]  Activity: as tolerated  Diet: DIET NUTRITIONAL SUPPLEMENTS All Meals; Ensure Enlive ( )  DIET REGULAR    Follow up instructions, discharge meds at bottom of this note. Plan was discussed with the pt. All questions answered. Patient was stable at time of discharge. Patient will call a physician or return if any concerns. Diagnostic Imaging/Tests:   Xr Abd Acute W 1 V Chest    Result Date: 7/3/2020  AP chest radiograph, KUB supine and upright views History: fever/constipation, 82 years Male Comparison:  Chest radiograph June 01, 2020 Findings:  Normal cardiomediastinal silhouette. Nonspecific mild diffuse interstitial prominence unchanged. Mild subsegmental atelectasis bilateral lung bases. No evidence of pneumothorax, pleural effusion, or air space consolidation. Visualized soft tissue and osseous structures otherwise unremarkable. No free air is evident.  The bowel gas pattern is nonspecific and there is no evidence of ileus or obstruction. No suspicious renal or ureteral calculi are evident. Park catheter appears in relative anatomic position. Extensive thoracolumbar posterior fusion hardware partially visualized. Visualized osseous structures appear otherwise unremarkable. Impression: No acute pathology identified. Mri Thorac Spine W Wo Cont    Result Date: 7/4/2020  MRI thoracic spine without and with IV contrast July 3, 2020 Reference exam: MRI lumbar spine same day INDICATION: Thoracolumbar surgery 4 weeks ago with fusion, having chills for 2 weeks, generalized fatigue FINDINGS: Routine MRI of the thoracic spine was performed using sagittal and axial imaging with pre and postcontrast images using 15 cc Dotarem. There is a fluid collection in the posterior soft tissues at the level of surgery, outside the thecal sac and spinal canal that extends from the T9 level down into the lumbar spine as described on the lumbar MR dated same day. Metallic artifact obscures the tip of the conus. There is mild disc space narrowing throughout the thoracic spine but T7-T8 shows disc height loss, with spurring contributing to mild right but moderate to severe left neural foraminal narrowing. T8-T9 shows spurs with moderate bilateral neural foraminal narrowing, and mild canal narrowing. T9-T10 shows bilateral pedicle screws and rods at D46, with metallic artifact with moderate to severe right and moderate left neural foraminal narrowing, with mild canal narrowing. G88-H60 shows metallic artifact with bilateral pedicle screws and rods causing artifact but with severe bilateral neural foraminal narrowing, and mild canal narrowing. T11-T12 shows extensive metallic artifact but with facet hypertrophy and disc-osteophyte complex with moderate to severe bilateral neural foraminal narrowing, with mild canal narrowing.  T12-L1 shows extensive metallic artifact, with what appears to be moderate to severe bilateral neural foraminal narrowing but no canal stenosis. There is no abnormal area of enhancement to suggest active infectious process but again sensitivity is very low in the metallic screw regions. IMPRESSION: 1. Extensive metallic artifact and some motion artifact significantly decreases sensitivity for small lesions but no abnormal area of enhancement to suggest an active infectious process is seen. 2. Posterior probable postsurgical fluid collection extending into the lumbar spine as described on the lumbar MRI dated same day, and up to the T9 level. 3. Some degree of degenerative changes seen as described above but again sensitivity is somewhat decreased. Mri Lumb Spine W Wo Cont    Result Date: 7/4/2020  MRI Lumbar spine without and with IV contrast July 3, 2020 Reference exam: MRI January 3, 2012 and plain film thoracolumbar spine May 29, 2020 Indication: Fusion surgery 4 weeks ago, having chills for 2 weeks, generalized fatigue Technique: Routine MRI of the lumbar spine was performed using sagittal and axial imaging with pre- and postcontrast images. 15 cc Dotarem contrast was administered to better delineate any infectious process. Findings: Motion artifact decreases sensitivity of the study. There is a large area of fluid in the surgical site posteriorly outside the thecal sac, that extends into the thoracic spine and extends down to the upper L3 level. Significant metallic artifact is seen at the surgical site as well. T12-L1 shows posterior protrusion, with some degree of foraminal stenosis bilaterally, mostly obscured by the metallic artifact on the axial imaging, sagittal imaging does suggest at least mild canal narrowing. L1-L2 shows bilateral pedicle screws and rods with metallic artifact with moderate bilateral neural foraminal narrowing but no canal stenosis. L2-L3 shows fusion across the disc space, with spurring contributing to mild bilateral neural foraminal narrowing but no canal stenosis.  L3-L4 shows partial fusion, with facet hypertrophy with only mild bilateral neural foraminal narrowing but no canal stenosis. L4-L5 shows bilateral pedicle screws and rods at L5, with some very minimal anterior subluxation and disc space narrowing, with no canal or foraminal stenosis. L5-S1 shows facet hypertrophy and spurring with moderate bilateral neural foraminal narrowing and no canal stenosis although there does appear to be impingement on the right S1 root in the neural recess. There is no abnormal area of enhancement to suggest active infectious process or abscess. IMPRESSION: 1. Posterior postsurgical fluid collection measuring up to 7.8 x 4.1 cm extending from the L3 level cephalad into the thoracic spine above the level of the field-of-view of this study, please see MRI thoracic spine dated same day. 2. Motion artifact does decrease sensitivity of the study. 3. T12-L1 shows protrusion and some degree of bilateral neural foraminal narrowing with mild canal narrowing but please see thoracic spine dated same day. 4. L1-L2 shows moderate bilateral neural foraminal narrowing. 5. L2-L3 shows mild bilateral neural foraminal narrowing. 6. L3-L4 shows mild bilateral neural foraminal narrowing. 7. L4-L5 shows fusion and postsurgical changes with minimal anterior subluxation but no stenosis. 8. L5-S1 shows moderate bilateral neural foraminal narrowing with some impingement on the right S1 root in the neural recess. Echocardiogram results:  No results found for this visit on 07/03/20.     Procedures done this admission:  * No surgery found *    All Micro Results     Procedure Component Value Units Date/Time    CULTURE, BODY FLUID [facility] STAIN [[id number]] Collected:  07/08/20 1341    Order Status:  Completed Specimen:  Abscess Updated:  07/10/20 3223     Special Requests: NO SPECIAL REQUESTS        GRAM STAIN 0 TO 3 WBC'S SEEN PER OIF      NO DEFINITE ORGANISM SEEN        Culture result: NO GROWTH 2 DAYS       CULTURE, BLOOD [[id number]] Collected:  07/06/20 0040    Order Status:  Completed Specimen:  Blood Updated:  07/10/20 0635     Special Requests: RIGHT ANTECUBITAL        Culture result: NO GROWTH 4 DAYS       CULTURE, BLOOD [[id number]] Collected:  07/06/20 0040    Order Status:  Completed Specimen:  Blood Updated:  07/10/20 0635     Special Requests: --        RIGHT  FOREARM       Culture result: NO GROWTH 4 DAYS             Labs: Results:       BMP, Mg, Phos Recent Labs     07/10/20  0407 07/09/20  0435 07/08/20  0320    140 140   K 3.7 3.5 4.1    105 105   CO2 31 29 29   AGAP 5* 6* 6*   BUN 17 19 19   CREA 0.75* 0.67* 0.71*   CA 8.3 8.5 8.9   GLU 98 105* 147*   MG  --  2.1 2.1      CBC Recent Labs     07/10/20  0407 07/09/20  0435 07/08/20  0320   WBC 7.0 7.2 7.5   RBC 3.42* 3.47* 3.31*   HGB 10.3* 9.9* 9.9*   HCT 30.1* 30.8* 29.1*    248 226   GRANS 65 68 78   LYMPH 20 19 14   EOS 0* 0* 0*   MONOS 10 10 7   BASOS 0 0 0   IG 5 3 1   ANEU 4.5 4.9 5.8   ABL 1.4 1.4 1.1   VERN 0.0 0.0 0.0   ABM 0.7 0.7 0.5   ABB 0.0 0.0 0.0   AIG 0.4 0.2 0.1      LFT Recent Labs     07/10/20  0407 07/09/20  0435 07/08/20  0320   ALT 49 27 17   AP 59 57 57   TP 5.8* 5.9* 6.1*   ALB 2.5* 2.4* 2.4*   GLOB 3.3 3.5 3.7*   AGRAT 0.8* 0.7* 0.6*      Cardiac Testing No results found for: BNPP, BNP, CPK, RCK1, RCK2, RCK3, RCK4, CKMB, CKNDX, CKND1, TROPT, TROIQ   Coagulation Tests No results found for: PTP, INR, APTT, INREXT   A1c No results found for: HBA1C, HGBE8, PFO9IKWA   Lipid Panel No results found for: CHOL, CHOLPOCT, CHOLX, CHLST, CHOLV, 593163, HDL, HDLP, LDL, LDLC, DLDLP, 308821, VLDLC, VLDL, TGLX, TRIGL, TRIGP, TGLPOCT, CHHD, CHHDX   Thyroid Panel No results found for: TSH, T4, FT4, TT3, T3U, TSHEXT     Most Recent UA Lab Results   Component Value Date/Time    Color KOSTAS 07/03/2020 03:18 PM    Appearance CLEAR 07/03/2020 03:18 PM    Specific gravity 1.025 (H) 07/03/2020 03:18 PM    pH (UA) 6.0 07/03/2020 03:18 PM    Protein 100 (A) 07/03/2020 03:18 PM    Glucose Negative 07/03/2020 03:18 PM    Ketone 15 (A) 07/03/2020 03:18 PM    Bilirubin Negative 07/03/2020 03:18 PM    Blood Negative 07/03/2020 03:18 PM    Urobilinogen 0.2 07/03/2020 03:18 PM    Nitrites Negative 07/03/2020 03:18 PM    Leukocyte Esterase Negative 07/03/2020 03:18 PM    WBC 0-3 07/03/2020 03:18 PM    RBC 0-3 07/03/2020 03:18 PM    Epithelial cells 5-10 07/03/2020 03:18 PM    Bacteria 0 07/03/2020 03:18 PM    Casts 0-3 07/03/2020 03:18 PM    Crystals, urine 0 03/01/2015 08:33 AM    Mucus 0 03/01/2015 08:33 AM        Allergies   Allergen Reactions    Lipitor [Atorvastatin] Myalgia    Plavix [Clopidogrel] Nausea Only     Change of taste with food     Immunization History   Administered Date(s) Administered    Influenza High Dose Vaccine PF 12/28/2016, 01/09/2018, 09/20/2019    TB Skin Test (PPD) Intradermal 06/01/2020       All Labs from Last 24 Hrs:  Recent Results (from the past 24 hour(s))   CBC WITH AUTOMATED DIFF    Collection Time: 07/10/20  4:07 AM   Result Value Ref Range    WBC 7.0 4.3 - 11.1 K/uL    RBC 3.42 (L) 4.23 - 5.6 M/uL    HGB 10.3 (L) 13.6 - 17.2 g/dL    HCT 30.1 (L) 41.1 - 50.3 %    MCV 88.0 79.6 - 97.8 FL    MCH 30.1 26.1 - 32.9 PG    MCHC 34.2 31.4 - 35.0 g/dL    RDW 13.3 11.9 - 14.6 %    PLATELET 921 452 - 549 K/uL    MPV 9.3 (L) 9.4 - 12.3 FL    ABSOLUTE NRBC 0.00 0.0 - 0.2 K/uL    NEUTROPHILS 65 43 - 78 %    LYMPHOCYTES 20 13 - 44 %    MONOCYTES 10 4.0 - 12.0 %    EOSINOPHILS 0 (L) 0.5 - 7.8 %    BASOPHILS 0 0.0 - 2.0 %    IMMATURE GRANULOCYTES 5 0.0 - 5.0 %    ABS. NEUTROPHILS 4.5 1.7 - 8.2 K/UL    ABS. LYMPHOCYTES 1.4 0.5 - 4.6 K/UL    ABS. MONOCYTES 0.7 0.1 - 1.3 K/UL    ABS. EOSINOPHILS 0.0 0.0 - 0.8 K/UL    ABS. BASOPHILS 0.0 0.0 - 0.2 K/UL    ABS. IMM. GRANS.  0.4 0.0 - 0.5 K/UL    RBC COMMENTS OCCASIONAL  POLYCHROMASIA        RBC COMMENTS SLIGHT  ANISOCYTOSIS + POIKILOCYTOSIS        WBC COMMENTS Result Confirmed By Smear      PLATELET COMMENTS ADEQUATE      DF AUTOMATED     METABOLIC PANEL, COMPREHENSIVE    Collection Time: 07/10/20  4:07 AM   Result Value Ref Range    Sodium 143 136 - 145 mmol/L    Potassium 3.7 3.5 - 5.1 mmol/L    Chloride 107 98 - 107 mmol/L    CO2 31 21 - 32 mmol/L    Anion gap 5 (L) 7 - 16 mmol/L    Glucose 98 65 - 100 mg/dL    BUN 17 8 - 23 MG/DL    Creatinine 0.75 (L) 0.8 - 1.5 MG/DL    GFR est AA >60 >60 ml/min/1.73m2    GFR est non-AA >60 >60 ml/min/1.73m2    Calcium 8.3 8.3 - 10.4 MG/DL    Bilirubin, total 0.3 0.2 - 1.1 MG/DL    ALT (SGPT) 49 12 - 65 U/L    AST (SGOT) 45 (H) 15 - 37 U/L    Alk.  phosphatase 59 50 - 136 U/L    Protein, total 5.8 (L) 6.3 - 8.2 g/dL    Albumin 2.5 (L) 3.2 - 4.6 g/dL    Globulin 3.3 2.3 - 3.5 g/dL    A-G Ratio 0.8 (L) 1.2 - 3.5         Current Med List in Hospital:   Current Facility-Administered Medications   Medication Dose Route Frequency    dexAMETHasone (DECADRON) tablet 6 mg  6 mg Oral DAILY    0.9% sodium chloride infusion 250 mL  250 mL IntraVENous PRN    aspirin chewable tablet 81 mg  81 mg Oral DAILY    docusate sodium (COLACE) capsule 200 mg  200 mg Oral DAILY    doxazosin (CARDURA) tablet 2 mg  2 mg Oral DAILY    pantoprazole (PROTONIX) tablet 40 mg  40 mg Oral ACB    gabapentin (NEURONTIN) capsule 300 mg  300 mg Oral TID    levothyroxine (SYNTHROID) tablet 100 mcg  100 mcg Oral ACB    lisinopriL (PRINIVIL, ZESTRIL) tablet 5 mg  5 mg Oral DAILY    traMADoL (ULTRAM) tablet 50 mg  50 mg Oral Q6H PRN    sodium chloride (NS) flush 5-40 mL  5-40 mL IntraVENous Q8H    sodium chloride (NS) flush 5-40 mL  5-40 mL IntraVENous PRN    acetaminophen (TYLENOL) tablet 650 mg  650 mg Oral Q4H PRN    ondansetron (ZOFRAN) injection 4 mg  4 mg IntraVENous Q4H PRN    magnesium hydroxide (MILK OF MAGNESIA) 400 mg/5 mL oral suspension 30 mL  30 mL Oral DAILY PRN    enoxaparin (LOVENOX) injection 40 mg  40 mg SubCUTAneous Q24H    HYDROcodone-acetaminophen (NORCO) 5-325 mg per tablet 1 Tab  1 Tab Oral Q6H PRN       Discharge Exam:  Patient Vitals for the past 24 hrs:   Temp Pulse Resp BP SpO2   07/10/20 1555 98.8 °F (37.1 °C) 85 18 132/71 96 %   07/10/20 1154 98.8 °F (37.1 °C) 88 18 130/76 96 %   07/10/20 0724 98.6 °F (37 °C) 78 18 145/82 98 %   07/10/20 0454 97.7 °F (36.5 °C) 73 20 145/77 93 %   07/09/20 2320 98.1 °F (36.7 °C) 68 19 146/67 94 %   07/09/20 2024 98.2 °F (36.8 °C) 65 19 151/68 95 %     Oxygen Therapy  O2 Sat (%): 96 % (07/10/20 1555)  Pulse via Oximetry: 92 beats per minute (07/04/20 0116)  O2 Device: Nasal cannula (07/09/20 1927)  O2 Flow Rate (L/min): 2 l/min (07/09/20 1927)    Intake/Output Summary (Last 24 hours) at 7/10/2020 1725  Last data filed at 7/10/2020 1359  Gross per 24 hour   Intake 720 ml   Output 802 ml   Net -82 ml       *Note that automatically entered I/Os may not be accurate; dependent on patient compliance with collection and accurate  by assistants. General:    Well nourished. Alert. Eyes:   Normal sclerae. Extraocular movements intact. ENT:  Normocephalic, atraumatic. Moist mucous membranes  CV:   Regular rate and rhythm. No murmur, rub, or gallop. Lungs:  Clear to auscultation bilaterally. No wheezing, rhonchi, or rales. Abdomen: Soft, nontender, nondistended. Bowel sounds normal.   Extremities: Warm and dry. No cyanosis or edema. Neurologic: CN II-XII grossly intact. Sensation intact. Skin:     No rashes or jaundice. Psych:  Normal mood and affect. Discharge Info:   Current Discharge Medication List      START taking these medications    Details   dexAMETHasone (DECADRON) 4 mg tablet Take 4 mg by mouth Daily (before breakfast) for 4 days. Qty: 4 Tab, Refills: 0      rivaroxaban (Xarelto) 10 mg tablet Take 1 Tab by mouth daily (with breakfast). Qty: 14 Tab, Refills: 0      ondansetron (ZOFRAN ODT) 4 mg disintegrating tablet Take 1 Tab by mouth every eight (8) hours as needed for Nausea or Vomiting.   Qty: 15 Tab, Refills: 0 CONTINUE these medications which have NOT CHANGED    Details   HYDROcodone-acetaminophen (Norco) 5-325 mg per tablet Take 1 Tab by mouth every six (6) hours as needed for Pain. Per patient every 6 hours medication is taken      docusate sodium (COLACE) 100 mg capsule Take 200 mg by mouth daily. gabapentin (NEURONTIN) 300 mg capsule Take 300 mg by mouth three (3) times daily. esomeprazole (NexIUM) 20 mg capsule Take 20 mg by mouth daily. traMADoL (ULTRAM) 50 mg tablet Take 50 mg by mouth every six (6) hours as needed for Pain. lisinopriL (PRINIVIL, ZESTRIL) 5 mg tablet Take 5 mg by mouth daily. aspirin 81 mg chewable tablet Take 81 mg by mouth daily. doxazosin (CARDURA) 2 mg tablet Take 2 mg by mouth daily. levothyroxine (LEVOTHROID) 100 mcg tablet Take 100 mcg by mouth Daily (before breakfast). polyethylene glycol (MIRALAX) 17 gram packet Take 17 g by mouth daily as needed for Constipation. mix with 6 oz of fluids             Follow Up Orders: Follow-up Appointments   Procedures    FOLLOW UP VISIT Appointment in: One Week F/u with PCP in 1 week     F/u with PCP in 1 week     Standing Status:   Standing     Number of Occurrences:   1     Order Specific Question:   Appointment in     Answer: One Week       Follow-up Information     Follow up With Specialties Details Why Contact Info    Wallace Gramajo MD Orthopedic Surgery Schedule an appointment as soon as possible for a visit  [address] [place] [address],[address] [facility]  [phone]      Karine Hong MD Family Practice Schedule an appointment as soon as possible for a visit  [address] [place] [address]  [phone]      [facility] EMERGENCY DEPT Emergency Medicine  As needed, If symptoms worsen [address] [phone]          Time spent in patient discharge planning and coordination 39 minutes.     Signed:  Annika Hilario MD

## 2020-07-10 NOTE — DISCHARGE INSTRUCTIONS
DISCHARGE SUMMARY from Nurse    PATIENT INSTRUCTIONS:    After general anesthesia or intravenous sedation, for 24 hours or while taking prescription Narcotics:  · Limit your activities  · Do not drive and operate hazardous machinery  · Do not make important personal or business decisions  · Do  not drink alcoholic beverages  · If you have not urinated within 8 hours after discharge, please contact your surgeon on call. Report the following to your surgeon:  · Excessive pain, swelling, redness or odor of or around the surgical area  · Temperature over 100.5  · Nausea and vomiting lasting longer than 4 hours or if unable to take medications  · Any signs of decreased circulation or nerve impairment to extremity: change in color, persistent  numbness, tingling, coldness or increase pain  · Any questions    What to do at Home:  Recommended activity: Activity as tolerated,     Close follow-up with orthopedic surgery. Pt will need to schedule appts with orthopedic surgery and primary care physician      Return to emergency department symptoms worsen or progress in any way. If you experience any of the following symptoms shortness of breath not relieved by rest, pain not relieved by rest, chest pain or pressure, increase in weakness fatigue or swelling, temperature greater than 101, or increase in nausea, vomiting, diarrhea  please follow up with MD.    *  Please give a list of your current medications to your Primary Care Provider. *  Please update this list whenever your medications are discontinued, doses are      changed, or new medications (including over-the-counter products) are added. *  Please carry medication information at all times in case of emergency situations. These are general instructions for a healthy lifestyle:    No smoking/ No tobacco products/ Avoid exposure to second hand smoke  Surgeon General's Warning:  Quitting smoking now greatly reduces serious risk to your health.     Obesity, smoking, and sedentary lifestyle greatly increases your risk for illness    A healthy diet, regular physical exercise & weight monitoring are important for maintaining a healthy lifestyle    You may be retaining fluid if you have a history of heart failure or if you experience any of the following symptoms:  Weight gain of 3 pounds or more overnight or 5 pounds in a week, increased swelling in our hands or feet or shortness of breath while lying flat in bed. Please call your doctor as soon as you notice any of these symptoms; do not wait until your next office visit. The discharge information has been reviewed with the patient. The patient verbalized understanding. Discharge medications reviewed with the patient and appropriate educational materials and side effects teaching were provided. ___________________________________________________________________________________________________________________________________         Anorexia: Care Instructions  Your Care Instructions     Anorexia is a type of eating disorder. People who have anorexia don't eat enough to stay at a normal weight. Sometimes they also exercise too much. They do these things because they're so afraid of gaining weight. They believe that they're fat, even when they're thin. Often they think that losing even more weight will solve their problems and make their lives better. If you have anorexia, it can really harm your health. This is because your body doesn't get the nutrition it needs. The first step to controlling the problem is admitting that something is wrong. Then counseling can help you change how you think about food, the way you see your body, and any other emotional issues. It may take months or years, but you can recover from anorexia. Follow-up care is a key part of your treatment and safety. Be sure to make and go to all appointments, and call your doctor if you are having problems.  It's also a good idea to know your test results and keep a list of the medicines you take. How can you care for yourself at home? Follow your treatment plan  · Go to your counseling sessions. Call or talk with your counselor if you can't go or if you think the sessions aren't helping. Don't just stop going. · Take your medicines exactly as prescribed. Call your doctor if you think you are having a problem with your medicine. You will get more details on the specific medicines your doctor prescribes. Trust people who are helping you  · Listen to what counselors and nutrition experts say about healthy eating. · Learn about what is included in a balanced diet. Then discuss what you learn. · Let people know how you are feeling. Listen to how they are feeling too. · Accept support and feedback from other people. Learn to be easier on yourself  · Learn to focus on your good qualities. · If your body feels weak, slow down and do less. · Remind yourself that feeling bad about yourself is all part of your disorder. · Remember that it takes time to recover from anorexia. · Spend time with other people doing things you enjoy. · Try to focus on one goal at a time. · Don't blame yourself for your disorder. Develop healthy eating habits  · With your doctor and counselor, you will decide on a good weight for you. You will also decide how much weight you will gain each week. · Try not to argue with the people you eat with. Arguing increases stress. And stress can make make it harder for you to relax and eat. · Talk with other people during meals about things that interest you. Don't talk about food or gaining weight. Caring for a loved one with anorexia  · Remind yourself that anorexia is a long-lasting disorder. It takes time for changes to occur. · Show love and support for your loved one, especially if he or she gets discouraged. · Support your loved one as he or she goes through the steps of recovery. Focus on wellness.  Don't focus on food.  · Take care of yourself. Continue with your own interests, career, hobbies, and friends. · Don't blame yourself or look for the reason for the disorder. · If you are having a hard time, talk with a counselor. · Learn what to do if your loved one relapses. When should you call for help? [id number] anytime you think you may need emergency care. For example, call if:  · A person with anorexia seems depressed and is talking about suicide. If the talk about suicide seems real, stay with the person, or ask someone you trust to stay with the person, until you get emergency help. · You have a rapid or irregular heartbeat. · You passed out (lost consciousness). Call your doctor now or seek immediate medical care if:  · You feel hopeless or have thoughts of hurting yourself. Watch closely for changes in your health, and be sure to contact your doctor if:  · You have trouble sleeping. · You feel anxious or depressed. Where can you learn more? Go to http://www.gray.com/  Enter Z282 in the search box to learn more about \"Anorexia: Care Instructions. \"  Current as of: January 31, 2020               Content Version: 12.5  © 6539-3071 [employer]. Care instructions adapted under license by [employer] (which disclaims liability or warranty for this information). If you have questions about a medical condition or this instruction, always ask your healthcare professional. [place] [address] any warranty or liability for your use of this information. Advance Care Planning  People with COVID-19 may have no symptoms, mild symptoms, such as fever, cough, and shortness of breath or they may have more severe illness, developing severe and fatal pneumonia.   As a result, Advance Care Planning with attention to naming a health care decision maker (someone you trust to make healthcare decisions for you if you could not speak for yourself) and sharing other health care preferences is important BEFORE a possible health crisis. Please contact your Primary Care Provider to discuss Advance Care Planning. Preventing the Spread of Coronavirus Disease 2019 in Homes and Residential Communities  For the most recent information go to [employer]    Prevention steps for People with confirmed or suspected COVID-19 (including persons under investigation) who do not need to be hospitalized  and   People with confirmed COVID-19 who were hospitalized and determined to be medically stable to go home    Your healthcare provider and public health staff will evaluate whether you can be cared for at home. If it is determined that you do not need to be hospitalized and can be isolated at home, you will be monitored by staff from your local or state health department. You should follow the prevention steps below until a healthcare provider or local or state health department says you can return to your normal activities. Stay home except to get medical care  People who are mildly ill with COVID-19 are able to isolate at home during their illness. You should restrict activities outside your home, except for getting medical care. Do not go to work, school, or public areas. Avoid using public transportation, ride-sharing, or taxis. Separate yourself from other people and animals in your home  People: As much as possible, you should stay in a specific room and away from other people in your home. Also, you should use a separate bathroom, if available. Animals: You should restrict contact with pets and other animals while you are sick with COVID-19, just like you would around other people. Although there have not been reports of pets or other animals becoming sick with COVID-19, it is still recommended that people sick with COVID-19 limit contact with animals until more information is known about the virus.  When possible, have another member of your household care for your animals while you are sick. If you are sick with COVID-19, avoid contact with your pet, including petting, snuggling, being kissed or licked, and sharing food. If you must care for your pet or be around animals while you are sick, wash your hands before and after you interact with pets and wear a facemask. Call ahead before visiting your doctor  If you have a medical appointment, call the healthcare provider and tell them that you have or may have COVID-19. This will help the healthcare providers office take steps to keep other people from getting infected or exposed. Wear a facemask  You should wear a facemask when you are around other people (e.g., sharing a room or vehicle) or pets and before you enter a healthcare providers office. If you are not able to wear a facemask (for example, because it causes trouble breathing), then people who live with you should not stay in the same room with you, or they should wear a facemask if they enter your room. Cover your coughs and sneezes  Cover your mouth and nose with a tissue when you cough or sneeze. Throw used tissues in a lined trash can. Immediately wash your hands with soap and water for at least 20 seconds or, if soap and water are not available, clean your hands with an alcohol-based hand  that contains at least 60% alcohol. Clean your hands often  Wash your hands often with soap and water for at least 20 seconds, especially after blowing your nose, coughing, or sneezing; going to the bathroom; and before eating or preparing food. If soap and water are not readily available, use an alcohol-based hand  with at least 60% alcohol, covering all surfaces of your hands and rubbing them together until they feel dry. Soap and water are the best option if hands are visibly dirty. Avoid touching your eyes, nose, and mouth with unwashed hands.   Avoid sharing personal household items  You should not share dishes, drinking glasses, cups, eating utensils, towels, or bedding with other people or pets in your home. After using these items, they should be washed thoroughly with soap and water. Clean all high-touch surfaces everyday  High touch surfaces include counters, tabletops, doorknobs, bathroom fixtures, toilets, phones, keyboards, tablets, and bedside tables. Also, clean any surfaces that may have blood, stool, or body fluids on them. Use a household cleaning spray or wipe, according to the label instructions. Labels contain instructions for safe and effective use of the cleaning product including precautions you should take when applying the product, such as wearing gloves and making sure you have good ventilation during use of the product. Monitor your symptoms  Seek prompt medical attention if your illness is worsening (e.g., difficulty breathing). Before seeking care, call your healthcare provider and tell them that you have, or are being evaluated for, COVID-19. Put on a facemask before you enter the facility. These steps will help the healthcare providers office to keep other people in the office or waiting room from getting infected or exposed. Ask your healthcare provider to call the local or [facility] health department. Persons who are placed under active monitoring or facilitated self-monitoring should follow instructions provided by their local health department or occupational health professionals, as appropriate. When working with your local health department check their available hours. If you have a medical emergency and need to call 911, notify the dispatch personnel that you have, or are being evaluated for COVID-19. If possible, put on a facemask before emergency medical services arrive. Discontinuing home isolation  Patients with confirmed COVID-19 should remain under home isolation precautions until the risk of secondary transmission to others is thought to be low.  The decision to discontinue home isolation precautions should be made on a case-by-case basis, in consultation with healthcare providers and state and local health departments.

## 2020-07-11 ENCOUNTER — HOME HEALTH ADMISSION (OUTPATIENT)
Dept: HOME HEALTH SERVICES | Facility: HOME HEALTH | Age: 85
End: 2020-07-11

## 2020-07-11 LAB
BACTERIA SPEC CULT: NORMAL
BACTERIA SPEC CULT: NORMAL
SERVICE CMNT-IMP: NORMAL
SERVICE CMNT-IMP: NORMAL

## 2020-07-13 ENCOUNTER — PATIENT OUTREACH (OUTPATIENT)
Dept: CASE MANAGEMENT | Age: 85
End: 2020-07-13

## 2020-07-13 NOTE — PROGRESS NOTES
CTN attempt to [facility] after recent d/c. Patient admit to [facility]. CTN to follow pending d/c.

## 2020-07-14 ENCOUNTER — HOME HEALTH ADMISSION (OUTPATIENT)
Dept: HOME HEALTH SERVICES | Facility: HOME HEALTH | Age: 85
End: 2020-07-14
Payer: MEDICARE

## 2020-07-18 ENCOUNTER — HOME CARE VISIT (OUTPATIENT)
Dept: SCHEDULING | Facility: HOME HEALTH | Age: 85
End: 2020-07-18
Payer: MEDICARE

## 2020-07-18 PROCEDURE — 3331090001 HH PPS REVENUE CREDIT

## 2020-07-18 PROCEDURE — 3331090002 HH PPS REVENUE DEBIT

## 2020-07-18 PROCEDURE — G0299 HHS/HOSPICE OF RN EA 15 MIN: HCPCS

## 2020-07-18 PROCEDURE — 400013 HH SOC

## 2020-07-19 VITALS
TEMPERATURE: 97.8 F | HEART RATE: 75 BPM | SYSTOLIC BLOOD PRESSURE: 122 MMHG | OXYGEN SATURATION: 97 % | RESPIRATION RATE: 16 BRPM | DIASTOLIC BLOOD PRESSURE: 78 MMHG

## 2020-07-19 PROCEDURE — 3331090002 HH PPS REVENUE DEBIT

## 2020-07-19 PROCEDURE — 3331090001 HH PPS REVENUE CREDIT

## 2020-07-20 ENCOUNTER — HOME CARE VISIT (OUTPATIENT)
Dept: HOME HEALTH SERVICES | Facility: HOME HEALTH | Age: 85
End: 2020-07-20
Payer: MEDICARE

## 2020-07-20 PROCEDURE — 3331090002 HH PPS REVENUE DEBIT

## 2020-07-20 PROCEDURE — 3331090001 HH PPS REVENUE CREDIT

## 2020-07-21 ENCOUNTER — HOME CARE VISIT (OUTPATIENT)
Dept: SCHEDULING | Facility: HOME HEALTH | Age: 85
End: 2020-07-21
Payer: MEDICARE

## 2020-07-21 VITALS
OXYGEN SATURATION: 96 % | DIASTOLIC BLOOD PRESSURE: 72 MMHG | HEART RATE: 92 BPM | SYSTOLIC BLOOD PRESSURE: 110 MMHG | RESPIRATION RATE: 18 BRPM | TEMPERATURE: 97.2 F

## 2020-07-21 PROCEDURE — G0299 HHS/HOSPICE OF RN EA 15 MIN: HCPCS

## 2020-07-21 PROCEDURE — 3331090001 HH PPS REVENUE CREDIT

## 2020-07-21 PROCEDURE — 3331090002 HH PPS REVENUE DEBIT

## 2020-07-22 PROCEDURE — 3331090002 HH PPS REVENUE DEBIT

## 2020-07-22 PROCEDURE — 3331090001 HH PPS REVENUE CREDIT

## 2020-07-23 PROCEDURE — 3331090001 HH PPS REVENUE CREDIT

## 2020-07-23 PROCEDURE — 3331090002 HH PPS REVENUE DEBIT

## 2020-07-24 PROCEDURE — 3331090001 HH PPS REVENUE CREDIT

## 2020-07-24 PROCEDURE — 3331090002 HH PPS REVENUE DEBIT

## 2020-07-25 ENCOUNTER — HOME CARE VISIT (OUTPATIENT)
Dept: SCHEDULING | Facility: HOME HEALTH | Age: 85
End: 2020-07-25
Payer: MEDICARE

## 2020-07-25 VITALS
OXYGEN SATURATION: 94 % | DIASTOLIC BLOOD PRESSURE: 70 MMHG | RESPIRATION RATE: 18 BRPM | HEART RATE: 66 BPM | TEMPERATURE: 97.1 F | SYSTOLIC BLOOD PRESSURE: 112 MMHG

## 2020-07-25 PROCEDURE — 3331090001 HH PPS REVENUE CREDIT

## 2020-07-25 PROCEDURE — G0299 HHS/HOSPICE OF RN EA 15 MIN: HCPCS

## 2020-07-25 PROCEDURE — 3331090002 HH PPS REVENUE DEBIT

## 2020-07-26 PROCEDURE — 3331090002 HH PPS REVENUE DEBIT

## 2020-07-26 PROCEDURE — 3331090001 HH PPS REVENUE CREDIT

## 2020-07-27 PROCEDURE — 3331090001 HH PPS REVENUE CREDIT

## 2020-07-27 PROCEDURE — 3331090002 HH PPS REVENUE DEBIT

## 2020-07-28 PROCEDURE — 3331090002 HH PPS REVENUE DEBIT

## 2020-07-28 PROCEDURE — 3331090001 HH PPS REVENUE CREDIT

## 2020-07-29 ENCOUNTER — HOME CARE VISIT (OUTPATIENT)
Dept: SCHEDULING | Facility: HOME HEALTH | Age: 85
End: 2020-07-29
Payer: MEDICARE

## 2020-07-29 VITALS
DIASTOLIC BLOOD PRESSURE: 74 MMHG | HEART RATE: 76 BPM | SYSTOLIC BLOOD PRESSURE: 118 MMHG | OXYGEN SATURATION: 97 % | RESPIRATION RATE: 18 BRPM | TEMPERATURE: 97.9 F

## 2020-07-29 PROCEDURE — 3331090001 HH PPS REVENUE CREDIT

## 2020-07-29 PROCEDURE — 3331090002 HH PPS REVENUE DEBIT

## 2020-07-29 PROCEDURE — G0299 HHS/HOSPICE OF RN EA 15 MIN: HCPCS

## 2021-02-04 ENCOUNTER — APPOINTMENT (RX ONLY)
Dept: URBAN - METROPOLITAN AREA CLINIC 349 | Facility: CLINIC | Age: 86
Setting detail: DERMATOLOGY
End: 2021-02-04

## 2021-02-04 DIAGNOSIS — L30.0 NUMMULAR DERMATITIS: ICD-10-CM | Status: INADEQUATELY CONTROLLED

## 2021-02-04 DIAGNOSIS — D22 MELANOCYTIC NEVI: ICD-10-CM

## 2021-02-04 DIAGNOSIS — Z85.828 PERSONAL HISTORY OF OTHER MALIGNANT NEOPLASM OF SKIN: ICD-10-CM

## 2021-02-04 DIAGNOSIS — Z71.89 OTHER SPECIFIED COUNSELING: ICD-10-CM

## 2021-02-04 PROBLEM — D22.5 MELANOCYTIC NEVI OF TRUNK: Status: ACTIVE | Noted: 2021-02-04

## 2021-02-04 PROCEDURE — 99203 OFFICE O/P NEW LOW 30 MIN: CPT

## 2021-02-04 PROCEDURE — ? PRESCRIPTION

## 2021-02-04 PROCEDURE — ? KOH PREP

## 2021-02-04 PROCEDURE — ? COUNSELING

## 2021-02-04 PROCEDURE — ? TREATMENT REGIMEN

## 2021-02-04 RX ORDER — FLUTICASONE PROPIONATE 0.05 MG/G
OINTMENT TOPICAL
Qty: 1 | Refills: 1 | Status: ERX | COMMUNITY
Start: 2021-02-04

## 2021-02-04 RX ADMIN — FLUTICASONE PROPIONATE: 0.05 OINTMENT TOPICAL at 00:00

## 2021-02-04 ASSESSMENT — LOCATION DETAILED DESCRIPTION DERM
LOCATION DETAILED: RIGHT NASAL ALA
LOCATION DETAILED: RIGHT SUPERIOR MEDIAL MIDBACK
LOCATION DETAILED: RIGHT SUPERIOR MEDIAL UPPER BACK
LOCATION DETAILED: RIGHT MID-UPPER BACK
LOCATION DETAILED: INFERIOR THORACIC SPINE
LOCATION DETAILED: RIGHT INFERIOR MEDIAL UPPER BACK

## 2021-02-04 ASSESSMENT — LOCATION ZONE DERM
LOCATION ZONE: TRUNK
LOCATION ZONE: NOSE

## 2021-02-04 ASSESSMENT — LOCATION SIMPLE DESCRIPTION DERM
LOCATION SIMPLE: RIGHT LOWER BACK
LOCATION SIMPLE: RIGHT NOSE
LOCATION SIMPLE: UPPER BACK
LOCATION SIMPLE: RIGHT UPPER BACK

## 2021-02-04 NOTE — PROCEDURE: KOH PREP
Koh Intro Text (From The.....): A KOH prep was ordered and evaluated from the
Detail Level: Detailed
Showing: negative findings within normal realm
Koh Procedure Text (Tissue Harvesting Technique): A 15-blade scalpel was used to scrape the skin. The skin scrapings were placed on a glass slide, covered with a coverslip and a KOH solution was applied.
Cpt Desired:

## 2022-02-04 ENCOUNTER — APPOINTMENT (RX ONLY)
Dept: URBAN - METROPOLITAN AREA CLINIC 349 | Facility: CLINIC | Age: 87
Setting detail: DERMATOLOGY
End: 2022-02-04

## 2022-02-04 DIAGNOSIS — Z85.828 PERSONAL HISTORY OF OTHER MALIGNANT NEOPLASM OF SKIN: ICD-10-CM

## 2022-02-04 PROCEDURE — ? COUNSELING

## 2022-02-04 ASSESSMENT — LOCATION ZONE DERM: LOCATION ZONE: NOSE

## 2022-02-04 ASSESSMENT — LOCATION DETAILED DESCRIPTION DERM: LOCATION DETAILED: RIGHT NASAL ALA

## 2022-02-04 ASSESSMENT — LOCATION SIMPLE DESCRIPTION DERM: LOCATION SIMPLE: RIGHT NOSE

## 2024-04-20 NOTE — PROGRESS NOTES
Called patient evening of 6-11 to schedule PT for 6-12, spouse verbalizes understanding. .Was 5 minutes from patien's home, when I receive an email from office stated that spouse had called in and wanted to cancel PT for the day, no reason given.   Called & left a message w/Dr. Tyree Merlin around 3:30 pm on Friday, 6-12 I-STAT Chem-8+ Results:   Value Reference Range   Sodium 127 136-145 mmol/L   Potassium  4.3 3.5-5.1 mmol/L   Chloride 95  mmol/L   Ionized Calcium 1.18 1.06-1.42 mmol/L   CO2 (measured) 19 23-29 mmol/L   Glucose 592  mg/dL   BUN 10 6-30 mg/dL   Creatinine 1.0 0.5-1.4 mg/dL   Hematocrit 36 36-54%

## (undated) DEVICE — SYR 10ML LUER LOK 1/5ML GRAD --

## (undated) DEVICE — SUTURE VCRL + 3-0 L27IN ABSRB UD PS-2 L19MM 3/8 CIR PRIM VCP427H

## (undated) DEVICE — SUTURE VCRL SZ 1 L27IN ABSRB UD L36MM CP-1 1/2 CIR REV CUT J268H

## (undated) DEVICE — BONE WAX WHITE: Brand: BONE WAX WHITE

## (undated) DEVICE — INTENDED FOR TISSUE SEPARATION, AND OTHER PROCEDURES THAT REQUIRE A SHARP SURGICAL BLADE TO PUNCTURE OR CUT.: Brand: BARD-PARKER SAFETY BLADES SIZE 15, STERILE

## (undated) DEVICE — AGENT HEMSTAT 8ML FLX TIP MTRX + DISP SURGIFLO

## (undated) DEVICE — 3M™ TEGADERM™ TRANSPARENT FILM DRESSING FRAME STYLE, 1629, 8 IN X 12 IN (20 CM X 30 CM), 10/CT 8CT/CASE: Brand: 3M™ TEGADERM™

## (undated) DEVICE — POSTERIOR LAMI VANPLT-LUCAS: Brand: MEDLINE INDUSTRIES, INC.

## (undated) DEVICE — STRIP SKIN CLSR W1XL5IN NYL REINF CURAD

## (undated) DEVICE — REM POLYHESIVE ADULT PATIENT RETURN ELECTRODE: Brand: VALLEYLAB

## (undated) DEVICE — 2000CC GUARDIAN II: Brand: GUARDIAN

## (undated) DEVICE — SOLUTION IV 1000ML 0.9% SOD CHL

## (undated) DEVICE — 1010 S-DRAPE TOWEL DRAPE 10/BX: Brand: STERI-DRAPE™

## (undated) DEVICE — KIT EVAC 0.19IN RECT TB DIA15FR 400CC PVC 3 SPR Y CONN DRN

## (undated) DEVICE — 5.0MM PRECISION ROUND

## (undated) DEVICE — AMD ANTIMICROBIAL GAUZE SPONGES,12 PLY USP TYPE VII, 0.2% POLYHEXAMETHYLENE BIGUANIDE HCI (PHMB): Brand: CURITY

## (undated) DEVICE — DRAPE SHT 3 QTR PROXIMA 53X77 --

## (undated) DEVICE — DRAPE XR C ARM 41X74IN LF --

## (undated) DEVICE — GARMENT,MEDLINE,DVT,INT,CALF,MED, GEN2: Brand: MEDLINE

## (undated) DEVICE — 3M™ TEGADERM™ TRANSPARENT FILM DRESSING FRAME STYLE, 1628, 6 IN X 8 IN (15 CM X 20 CM), 10/CT 8CT/CASE: Brand: 3M™ TEGADERM™

## (undated) DEVICE — KIT EVAC 0.13IN RECT TB DIA10FR 400CC PVC 3 SPR Y CONN DRN

## (undated) DEVICE — JACKSON TABLE POSITIONER KIT: Brand: MEDLINE INDUSTRIES, INC.

## (undated) DEVICE — CONTAINER,SPECIMEN,O.R.STRL,4.5OZ: Brand: MEDLINE

## (undated) DEVICE — SUTURE VCRL SZ 2-0 L27IN ABSRB UD L36MM CP-1 1/2 CIR REV J266H

## (undated) DEVICE — TRAY,URINE METER,100% SILICONE,16FR10ML: Brand: MEDLINE

## (undated) DEVICE — (D)PREP SKN CHLRAPRP APPL 26ML -- CONVERT TO ITEM 371833

## (undated) DEVICE — MASTISOL ADHESIVE LIQ 2/3ML

## (undated) DEVICE — INTENDED FOR TISSUE SEPARATION, AND OTHER PROCEDURES THAT REQUIRE A SHARP SURGICAL BLADE TO PUNCTURE OR CUT.: Brand: BARD-PARKER SAFETY BLADES SIZE 10, STERILE